# Patient Record
Sex: MALE | Race: ASIAN | NOT HISPANIC OR LATINO | URBAN - METROPOLITAN AREA
[De-identification: names, ages, dates, MRNs, and addresses within clinical notes are randomized per-mention and may not be internally consistent; named-entity substitution may affect disease eponyms.]

---

## 2017-08-22 ENCOUNTER — INPATIENT (INPATIENT)
Facility: HOSPITAL | Age: 67
LOS: 2 days | Discharge: HOME | End: 2017-08-25
Attending: INTERNAL MEDICINE | Admitting: INTERNAL MEDICINE
Payer: MEDICARE

## 2017-08-22 VITALS
WEIGHT: 164.91 LBS | HEART RATE: 48 BPM | DIASTOLIC BLOOD PRESSURE: 55 MMHG | RESPIRATION RATE: 19 BRPM | TEMPERATURE: 98 F | HEIGHT: 66 IN | SYSTOLIC BLOOD PRESSURE: 141 MMHG

## 2017-08-22 LAB
ALBUMIN SERPL ELPH-MCNC: 2.2 G/DL — LOW (ref 3.3–5)
ALP SERPL-CCNC: 106 U/L — SIGNIFICANT CHANGE UP (ref 40–120)
ALT FLD-CCNC: 32 U/L — SIGNIFICANT CHANGE UP (ref 12–78)
ANION GAP SERPL CALC-SCNC: 14 MMOL/L — SIGNIFICANT CHANGE UP (ref 5–17)
ANION GAP SERPL CALC-SCNC: 9 MMOL/L — SIGNIFICANT CHANGE UP (ref 5–17)
APTT BLD: 30.1 SEC — SIGNIFICANT CHANGE UP (ref 27.5–37.4)
AST SERPL-CCNC: 23 U/L — SIGNIFICANT CHANGE UP (ref 15–37)
BILIRUB SERPL-MCNC: 0.3 MG/DL — SIGNIFICANT CHANGE UP (ref 0.2–1.2)
BUN SERPL-MCNC: 20 MG/DL — SIGNIFICANT CHANGE UP (ref 7–23)
BUN SERPL-MCNC: 57 MG/DL — HIGH (ref 7–23)
CALCIUM SERPL-MCNC: 7.7 MG/DL — LOW (ref 8.5–10.1)
CALCIUM SERPL-MCNC: 8.4 MG/DL — LOW (ref 8.5–10.1)
CHLORIDE SERPL-SCNC: 103 MMOL/L — SIGNIFICANT CHANGE UP (ref 96–108)
CHLORIDE SERPL-SCNC: 106 MMOL/L — SIGNIFICANT CHANGE UP (ref 96–108)
CK MB CFR SERPL CALC: 2.6 NG/ML — SIGNIFICANT CHANGE UP (ref 0.5–3.6)
CO2 SERPL-SCNC: 20 MMOL/L — LOW (ref 22–31)
CO2 SERPL-SCNC: 30 MMOL/L — SIGNIFICANT CHANGE UP (ref 22–31)
CREAT SERPL-MCNC: 4.15 MG/DL — HIGH (ref 0.5–1.3)
CREAT SERPL-MCNC: 9.83 MG/DL — HIGH (ref 0.5–1.3)
EOSINOPHIL NFR BLD AUTO: 5 % — SIGNIFICANT CHANGE UP (ref 0–6)
GLUCOSE SERPL-MCNC: 103 MG/DL — HIGH (ref 70–99)
GLUCOSE SERPL-MCNC: 89 MG/DL — SIGNIFICANT CHANGE UP (ref 70–99)
HCT VFR BLD CALC: 24.9 % — LOW (ref 39–50)
HGB BLD-MCNC: 8.2 G/DL — LOW (ref 13–17)
INR BLD: 0.96 RATIO — SIGNIFICANT CHANGE UP (ref 0.88–1.16)
LYMPHOCYTES # BLD AUTO: 24 % — SIGNIFICANT CHANGE UP (ref 13–44)
MAGNESIUM SERPL-MCNC: 2.7 MG/DL — HIGH (ref 1.6–2.6)
MCHC RBC-ENTMCNC: 29.6 PG — SIGNIFICANT CHANGE UP (ref 27–34)
MCHC RBC-ENTMCNC: 33.1 GM/DL — SIGNIFICANT CHANGE UP (ref 32–36)
MCV RBC AUTO: 89.4 FL — SIGNIFICANT CHANGE UP (ref 80–100)
MONOCYTES NFR BLD AUTO: 3 % — SIGNIFICANT CHANGE UP (ref 2–14)
NEUTROPHILS NFR BLD AUTO: 68 % — SIGNIFICANT CHANGE UP (ref 43–77)
NT-PROBNP SERPL-SCNC: HIGH PG/ML (ref 0–125)
PLAT MORPH BLD: NORMAL — SIGNIFICANT CHANGE UP
PLATELET # BLD AUTO: 164 K/UL — SIGNIFICANT CHANGE UP (ref 150–400)
POTASSIUM SERPL-MCNC: 3.5 MMOL/L — SIGNIFICANT CHANGE UP (ref 3.5–5.3)
POTASSIUM SERPL-MCNC: 6.7 MMOL/L — CRITICAL HIGH (ref 3.5–5.3)
POTASSIUM SERPL-SCNC: 3.5 MMOL/L — SIGNIFICANT CHANGE UP (ref 3.5–5.3)
POTASSIUM SERPL-SCNC: 6.7 MMOL/L — CRITICAL HIGH (ref 3.5–5.3)
PROT SERPL-MCNC: 5.7 GM/DL — LOW (ref 6–8.3)
PROTHROM AB SERPL-ACNC: 10.4 SEC — SIGNIFICANT CHANGE UP (ref 9.8–12.7)
RBC # BLD: 2.78 M/UL — LOW (ref 4.2–5.8)
RBC # FLD: 14 % — SIGNIFICANT CHANGE UP (ref 11–15)
RBC BLD AUTO: NORMAL — SIGNIFICANT CHANGE UP
SODIUM SERPL-SCNC: 140 MMOL/L — SIGNIFICANT CHANGE UP (ref 135–145)
SODIUM SERPL-SCNC: 142 MMOL/L — SIGNIFICANT CHANGE UP (ref 135–145)
TROPONIN I SERPL-MCNC: 0.04 NG/ML — SIGNIFICANT CHANGE UP (ref 0.01–0.04)
WBC # BLD: 9 K/UL — SIGNIFICANT CHANGE UP (ref 3.8–10.5)
WBC # FLD AUTO: 9 K/UL — SIGNIFICANT CHANGE UP (ref 3.8–10.5)

## 2017-08-22 PROCEDURE — 99291 CRITICAL CARE FIRST HOUR: CPT

## 2017-08-22 PROCEDURE — 71010: CPT | Mod: 26

## 2017-08-22 RX ORDER — HYDRALAZINE HCL 50 MG
50 TABLET ORAL DAILY
Qty: 0 | Refills: 0 | Status: DISCONTINUED | OUTPATIENT
Start: 2017-08-22 | End: 2017-08-23

## 2017-08-22 RX ORDER — HYDRALAZINE HCL 50 MG
10 TABLET ORAL ONCE
Qty: 0 | Refills: 0 | Status: COMPLETED | OUTPATIENT
Start: 2017-08-22 | End: 2017-08-22

## 2017-08-22 RX ORDER — ATORVASTATIN CALCIUM 80 MG/1
80 TABLET, FILM COATED ORAL AT BEDTIME
Qty: 0 | Refills: 0 | Status: DISCONTINUED | OUTPATIENT
Start: 2017-08-22 | End: 2017-08-25

## 2017-08-22 RX ORDER — DEXTROSE 50 % IN WATER 50 %
12.5 SYRINGE (ML) INTRAVENOUS ONCE
Qty: 0 | Refills: 0 | Status: DISCONTINUED | OUTPATIENT
Start: 2017-08-22 | End: 2017-08-25

## 2017-08-22 RX ORDER — CLOPIDOGREL BISULFATE 75 MG/1
75 TABLET, FILM COATED ORAL DAILY
Qty: 0 | Refills: 0 | Status: DISCONTINUED | OUTPATIENT
Start: 2017-08-22 | End: 2017-08-25

## 2017-08-22 RX ORDER — CHLORHEXIDINE GLUCONATE 213 G/1000ML
1 SOLUTION TOPICAL DAILY
Qty: 0 | Refills: 0 | Status: DISCONTINUED | OUTPATIENT
Start: 2017-08-22 | End: 2017-08-25

## 2017-08-22 RX ORDER — INSULIN LISPRO 100/ML
VIAL (ML) SUBCUTANEOUS EVERY 6 HOURS
Qty: 0 | Refills: 0 | Status: DISCONTINUED | OUTPATIENT
Start: 2017-08-22 | End: 2017-08-24

## 2017-08-22 RX ORDER — DEXTROSE 50 % IN WATER 50 %
50 SYRINGE (ML) INTRAVENOUS ONCE
Qty: 0 | Refills: 0 | Status: COMPLETED | OUTPATIENT
Start: 2017-08-22 | End: 2017-08-22

## 2017-08-22 RX ORDER — ASPIRIN/CALCIUM CARB/MAGNESIUM 324 MG
81 TABLET ORAL DAILY
Qty: 0 | Refills: 0 | Status: DISCONTINUED | OUTPATIENT
Start: 2017-08-22 | End: 2017-08-25

## 2017-08-22 RX ORDER — INSULIN HUMAN 100 [IU]/ML
5 INJECTION, SOLUTION SUBCUTANEOUS ONCE
Qty: 0 | Refills: 0 | Status: COMPLETED | OUTPATIENT
Start: 2017-08-22 | End: 2017-08-22

## 2017-08-22 RX ORDER — CALCIUM GLUCONATE 100 MG/ML
2 VIAL (ML) INTRAVENOUS ONCE
Qty: 2 | Refills: 0 | Status: COMPLETED | OUTPATIENT
Start: 2017-08-22 | End: 2017-08-22

## 2017-08-22 RX ORDER — HYDRALAZINE HCL 50 MG
10 TABLET ORAL ONCE
Qty: 0 | Refills: 0 | Status: DISCONTINUED | OUTPATIENT
Start: 2017-08-22 | End: 2017-08-22

## 2017-08-22 RX ORDER — DEXTROSE 50 % IN WATER 50 %
25 SYRINGE (ML) INTRAVENOUS ONCE
Qty: 0 | Refills: 0 | Status: DISCONTINUED | OUTPATIENT
Start: 2017-08-22 | End: 2017-08-25

## 2017-08-22 RX ORDER — DEXTROSE 50 % IN WATER 50 %
1 SYRINGE (ML) INTRAVENOUS ONCE
Qty: 0 | Refills: 0 | Status: DISCONTINUED | OUTPATIENT
Start: 2017-08-22 | End: 2017-08-25

## 2017-08-22 RX ORDER — SODIUM CHLORIDE 9 MG/ML
1000 INJECTION, SOLUTION INTRAVENOUS
Qty: 0 | Refills: 0 | Status: DISCONTINUED | OUTPATIENT
Start: 2017-08-22 | End: 2017-08-25

## 2017-08-22 RX ORDER — GLUCAGON INJECTION, SOLUTION 0.5 MG/.1ML
1 INJECTION, SOLUTION SUBCUTANEOUS ONCE
Qty: 0 | Refills: 0 | Status: DISCONTINUED | OUTPATIENT
Start: 2017-08-22 | End: 2017-08-25

## 2017-08-22 RX ORDER — INSULIN HUMAN 100 [IU]/ML
5 INJECTION, SOLUTION SUBCUTANEOUS ONCE
Qty: 0 | Refills: 0 | Status: DISCONTINUED | OUTPATIENT
Start: 2017-08-22 | End: 2017-08-22

## 2017-08-22 RX ORDER — HEPARIN SODIUM 5000 [USP'U]/ML
5000 INJECTION INTRAVENOUS; SUBCUTANEOUS EVERY 12 HOURS
Qty: 0 | Refills: 0 | Status: DISCONTINUED | OUTPATIENT
Start: 2017-08-22 | End: 2017-08-25

## 2017-08-22 RX ADMIN — Medication 10 MILLIGRAM(S): at 23:01

## 2017-08-22 RX ADMIN — INSULIN HUMAN 5 UNIT(S): 100 INJECTION, SOLUTION SUBCUTANEOUS at 16:45

## 2017-08-22 RX ADMIN — Medication 200 GRAM(S): at 15:43

## 2017-08-22 RX ADMIN — Medication 50 MILLILITER(S): at 16:45

## 2017-08-22 RX ADMIN — ATORVASTATIN CALCIUM 80 MILLIGRAM(S): 80 TABLET, FILM COATED ORAL at 23:01

## 2017-08-22 NOTE — H&P ADULT - NSHPPHYSICALEXAM_GEN_ALL_CORE
GENERAL: NAD, confused   HEAD:  Atraumatic, Normocephalic  EYES: EOMI, PERRLA, conjunctiva and sclera clear  NERVOUS SYSTEM:  Alert & Oriented X3, confused at times, Motor Strength 5/5 B/L upper and lower extremities;   CHEST/LUNG: CTA bilaterally; No rales, rhonchi, wheezing, or rubs  HEART: Regular rate and rhythm; No murmurs, rubs, or gallops HR 70   ABDOMEN: Soft, Nontender, Nondistended; Bowel sounds present  EXTREMITIES:  2+ Peripheral Pulses, No clubbing, cyanosis, or edema  LYMPH: No lymphadenopathy noted  SKIN: R permicath

## 2017-08-22 NOTE — ED PROVIDER NOTE - MEDICAL DECISION MAKING DETAILS
patient pw bradycardia, presumably owing to hyperk patient pw bradycardia, presumably owing to hyperk vs sinus node dysfunction vs both. pacer pads will be placed and he will need HD and potassium correction. Will eventually need cardiology for pacemaker.

## 2017-08-22 NOTE — H&P ADULT - NSHPREVIEWOFSYSTEMS_GEN_ALL_CORE
CONSTITUTIONAL: No fever, weight loss, or fatigue    RESPIRATORY: No cough, wheezing, chills or hemoptysis; No shortness of breath  CARDIOVASCULAR: No chest pain, palpitations, dizziness, or leg swelling  GASTROINTESTINAL: No abdominal or epigastric pain. No nausea, vomiting, or hematemesis; No diarrhea or constipation. No melena or hematochezia.  GENITOURINARY: No dysuria, frequency, hematuria, or incontinence  NEUROLOGICAL: No headaches, memory loss, loss of strength, numbness, or tremors  SKIN: No itching, burning, rashes, or lesions   LYMPH NODES: No enlarged glands  ENDOCRINE: No heat or cold intolerance; No hair loss  MUSCULOSKELETAL: No joint pain or swelling; No muscle, back, or extremity pain  PSYCHIATRIC: No depression, anxiety, mood swings, or difficulty sleeping  HEME/LYMPH: No easy bruising, or bleeding gums  ALLERGY AND IMMUNOLOGIC: No hives or eczema CONSTITUTIONAL: No fever    RESPIRATORY: No cough, wheezing,  No shortness of breath  CARDIOVASCULAR: No chest pain, palpitations, dizziness, or leg swelling  GASTROINTESTINAL: No abdominal or epigastric pain. No nausea, vomiting, or hematemesis; No diarrhea or constipation. No melena or hematochezia.  GENITOURINARY: No dysuria, frequency, hematuria, or incontinence  NEUROLOGICAL: No headaches, memory loss, loss of strength, numbness, or tremors CONSTITUTIONAL: No fever  RESPIRATORY: No cough, wheezing,  No shortness of breath  CARDIOVASCULAR: No chest pain, palpitations, dizziness, or leg swelling  GASTROINTESTINAL: No abdominal pain No nausea, vomiting, No diarrhea or constipation.   GENITOURINARY: No dysuria, frequency, hematuria, or incontinence  NEUROLOGICAL: No headaches, loss of strength, numbness, or tremors

## 2017-08-22 NOTE — CONSULT NOTE ADULT - SUBJECTIVE AND OBJECTIVE BOX
Patient is a 67y old  Male sent to E.R from [facility] with bradycardia. Dialysis patient TTS for 4-5 months, recent bacteremia, no permanent HD access yet. Last dialysis on 8/19/17. No chest pain, no other c/o. Admits to nonadherence to low Potassium diet. History obtained from the patient, who is a poor historian, patient's wife, and available records. In E.R. found to have bradyarrhythmia without hyperkalemic EKG changes. Received I.V. Calcium Gluconate, Insulin and Dextrose. Renal consult is requested for dialysis.      PAST MEDICAL HISTORY:  ESRD  ASHD  HTN  Hyperlipidemia  NIDDM    PAST SURGICAL HISTORY:  Femoral dialysis catheter  Permacath X 2  Scheduled for AVF    SOCIAL HISTORY:  Former smoker, no ETOH,     No Known Allergies    MEDICATIONS  AT HOME:  AMLODIPINE  ASA  ATORVASTATIN  CALCITRIOL  CILOSTAZOL  PLAVIX  COLACE  PEPCID  HYDRALAZINE  ISOSORBIDE  JANUVIA  LABETALOL  LOSARTAN  PHOSLO      Vital Signs Last 24 Hrs  T(C): 36.5 (22 Aug 2017 15:44), Max: 36.6 (22 Aug 2017 15:11)  T(F): 97.7 (22 Aug 2017 15:44), Max: 97.9 (22 Aug 2017 15:11)  HR: 49 (22 Aug 2017 15:44) (48 - 49)  BP: 141/55 (22 Aug 2017 15:44) (141/55 - 141/55)  BP(mean): --  RR: 19 (22 Aug 2017 15:44) (19 - 19)  SpO2: 98% (22 Aug 2017 15:44) (98% - 98%)    PHYSICAL EXAM:  General: NAD, AAO x3, on stretcher in E.R., on external pacemaker precautions  Respiratory: b/l air entry, rales at bases  Cardiovascular: S1 S2 irregular, no rub  Gastrointestinal: soft, not tender, BS present  Extremities: 2 plus legs edema  Right chest wall PC dressed      CAPILLARY BLOOD GLUCOSE  107 (22 Aug 2017 15:13)        08-22    140  |  106  |  57<H>  ----------------------------<  103<H>  6.7<HH>   |  20<L>  |  9.83<H>    Ca    7.7<L>      22 Aug 2017 15:51  Mg     2.7     08-22    TPro  5.7<L>  /  Alb  2.2<L>  /  TBili  0.3  /  DBili  x   /  AST  23  /  ALT  32  /  AlkPhos  106  08-22    Hemoglobin: 8.2 g/dL (08-22 @ 15:51)  Hematocrit: 24.9 % (08-22 @ 15:51)  Potassium, Serum: 6.7 mmol/L (08-22 @ 15:51)  Blood Urea Nitrogen, Serum: 57 mg/dL (08-22 @ 15:51)  Calcium, Total Serum: 7.7 mg/dL (08-22 @ 15:51)      Creatinine, Serum: 9.83 (08-22 @ 15:51)    CBC Full  -  ( 22 Aug 2017 15:51 )  WBC Count : 9.0 K/uL  Hemoglobin : 8.2 g/dL  Hematocrit : 24.9 %  Platelet Count - Automated : 164 K/uL  Mean Cell Volume : 89.4 fl  Mean Cell Hemoglobin : 29.6 pg  Mean Cell Hemoglobin Concentration : 33.1 gm/dL  Auto Neutrophil # : x  Auto Lymphocyte # : x  Auto Monocyte # : x  Auto Eosinophil # : x  Auto Basophil # : x  Auto Neutrophil % : 68.0 %  Auto Lymphocyte % : 24.0 %  Auto Monocyte % : 3.0 %  Auto Eosinophil % : 5.0 %  Auto Basophil % : x

## 2017-08-22 NOTE — CONSULT NOTE ADULT - ASSESSMENT
ASHD, DM, HTN, ESRD, fluid overload, anemia, moderate hyperkalemia. Bradycardia without hyperkalemic EKG changes. To be evaluated by cardiology.  Will have urgent dialysis in E.R. or ICU. Advised to adhere to low dialysis diet. Spoke with patient's wife at bedside.

## 2017-08-22 NOTE — H&P ADULT - ATTENDING COMMENTS
I have seen and examined the patient. I agree with the above history, physical exam, and plan of care except as detailed below.    68 y/o M w/DM and ESRD on HD sent from dialysis for bradycardia in 20s-40s. Patient found to be hyperkalemic to 6.7 on blood work on arrival. Patient currently asymptomatic and hemodynamically stable despite bradycardia. Patient has received medical management for hyperkalemia and is now undergoing hemodialysis.    - HD  - Repeat chem following HD  - Appreciate cardiology consult  - Pacer pads on patient  - Insulin coverage as needed  - DVT prophylaxis    CCT 35 min

## 2017-08-22 NOTE — CONSULT NOTE ADULT - SUBJECTIVE AND OBJECTIVE BOX
HPI:  HPI:      Chief Complaint:  Patient is a 67y old  Male who presents with a chief complaint of   bradycardia  Review of Systems:    see above         Social History/Family History  SOCHX:   tobacco,  -  alcohol    FMHX: FA/MO  - contributory       Discussed with:  PMD, Family    Physical Exam:    Vital Signs:  Vital Signs Last 24 Hrs  T(C): 35.8 (22 Aug 2017 18:50), Max: 36.6 (22 Aug 2017 15:11)  T(F): 96.4 (22 Aug 2017 18:50), Max: 97.9 (22 Aug 2017 15:11)  HR: 45 (22 Aug 2017 18:53) (45 - 50)  BP: 183/63 (22 Aug 2017 18:53) (141/55 - 208/52)  BP(mean): 93 (22 Aug 2017 18:53) (93 - 93)  RR: 24 (22 Aug 2017 18:53) (19 - 24)  SpO2: 99% (22 Aug 2017 18:53) (98% - 100%)  Daily Height in cm: 167.64 (22 Aug 2017 15:11)    Daily   I&O's Summary        Chest:  Full & symmetric excursion, no increased effort, breath sounds clear  Cardiovascular:  bradycardia, Regular rhythm, S1, S2, no murmur/rub/S3/S4, no carotid/femoral/abdominal bruit, radial/pedal pulses 2+, no edema  Abdomen:  Soft, non-tender, non-distended, normoactive bowel sounds, no HSM      Laboratory:                          8.2    9.0   )-----------( 164      ( 22 Aug 2017 15:51 )             24.9     08-22    140  |  106  |  57<H>  ----------------------------<  103<H>  6.7<HH>   |  20<L>  |  9.83<H>    Ca    7.7<L>      22 Aug 2017 15:51  Mg     2.7     08-22    TPro  5.7<L>  /  Alb  2.2<L>  /  TBili  0.3  /  DBili  x   /  AST  23  /  ALT  32  /  AlkPhos  106  08-22      CARDIAC MARKERS ( 22 Aug 2017 15:51 )  .043 ng/mL / x     / x     / x     / 2.6 ng/mL      CAPILLARY BLOOD GLUCOSE  96 (22 Aug 2017 18:58)  107 (22 Aug 2017 15:13)        LIVER FUNCTIONS - ( 22 Aug 2017 15:51 )  Alb: 2.2 g/dL / Pro: 5.7 gm/dL / ALK PHOS: 106 U/L / ALT: 32 U/L / AST: 23 U/L / GGT: x           PT/INR - ( 22 Aug 2017 15:52 )   PT: 10.4 sec;   INR: 0.96 ratio         PTT - ( 22 Aug 2017 15:52 )  PTT:30.1 sec            Assessment:  Severe Bradycardia  Setting is renal failure with K at 6.7  Await HD, correction of this potassium level.   bedside attached pacer  observe in CCU, will follow          ·

## 2017-08-22 NOTE — H&P ADULT - HISTORY OF PRESENT ILLNESS
67M hx of ESRD on TThS HD was on his way to get his HD this AM and was found to be bradycardic with HR in 20-40's.  Pt was advised to go to the ED at that time.  In the Ed the pt presented with bradycardia 30-40's and was found to be hyperkalemic with a potassium of 6.5.  Pt denies SOB, CP, palpitations, at this time.  Pt was admitted to the ICU for emergent HD, D50/insulin/calcium cocktail was given in the ED, and pacer pads were placed on the patient. 67M hx of ESRD on TThS HD was on his way to get his HD this AM and was found to be bradycardic with HR in 20-40's.  Pt was advised to go to the ED at that time.  In the Ed the pt presented with bradycardia 30-40's and was found to be hyperkalemic with a potassium of 6.5.  Pt denies SOB, CP, palpitations, at this time.  Pt was admitted to the ICU for emergent HD, D50/insulin/calcium cocktail was given in the ED, and pacer pads were placed on the patient. Will f/u with post dialysis BMP's and monitor HR 67M PMH of HTN, DM, ESRD on TThS HD was on his way to get his HD this AM and was found to be bradycardic with HR in 20-40's.  Pt was advised to go to the ED at that time.  In the Ed the pt presented with bradycardia 30-40's and was found to be hyperkalemic with a potassium of 6.5.  Pt denies SOB, CP, palpitations, at this time.  Pt was admitted to the ICU for emergent HD, D50/insulin/calcium cocktail was given in the ED, and pacer pads were placed on the patient. Will f/u with post dialysis BMP's and monitor HR

## 2017-08-22 NOTE — H&P ADULT - PMH
ESRD (end stage renal disease) on dialysis    Essential hypertension    Type 2 diabetes mellitus with stage 4 chronic kidney disease, unspecified long term insulin use status

## 2017-08-22 NOTE — H&P ADULT - NSHPLABSRESULTS_GEN_ALL_CORE
Lab Results:  CBC  CBC Full  -  ( 22 Aug 2017 15:51 )  WBC Count : 9.0 K/uL  Hemoglobin : 8.2 g/dL  Hematocrit : 24.9 %  Platelet Count - Automated : 164 K/uL  Mean Cell Volume : 89.4 fl  Mean Cell Hemoglobin : 29.6 pg  Mean Cell Hemoglobin Concentration : 33.1 gm/dL  Auto Neutrophil # : x  Auto Lymphocyte # : x  Auto Monocyte # : x  Auto Eosinophil # : x  Auto Basophil # : x  Auto Neutrophil % : 68.0 %  Auto Lymphocyte % : 24.0 %  Auto Monocyte % : 3.0 %  Auto Eosinophil % : 5.0 %  Auto Basophil % : x    .		Differential:	[] Automated		[] Manual  Chemistry                        8.2    9.0   )-----------( 164      ( 22 Aug 2017 15:51 )             24.9     08-22    140  |  106  |  57<H>  ----------------------------<  103<H>  6.7<HH>   |  20<L>  |  9.83<H>    Ca    7.7<L>      22 Aug 2017 15:51  Mg     2.7     08-22    TPro  5.7<L>  /  Alb  2.2<L>  /  TBili  0.3  /  DBili  x   /  AST  23  /  ALT  32  /  AlkPhos  106  08-22    LIVER FUNCTIONS - ( 22 Aug 2017 15:51 )  Alb: 2.2 g/dL / Pro: 5.7 gm/dL / ALK PHOS: 106 U/L / ALT: 32 U/L / AST: 23 U/L / GGT: x           PT/INR - ( 22 Aug 2017 15:52 )   PT: 10.4 sec;   INR: 0.96 ratio         PTT - ( 22 Aug 2017 15:52 )  PTT:30.1 sec          MICROBIOLOGY/CULTURES:      RADIOLOGY RESULTS:

## 2017-08-22 NOTE — ED ADULT NURSE NOTE - OBJECTIVE STATEMENT
Patient brought in by EMS, was at dialysis center and started to feel short of breath. Patient was not dialyzed today. MD aware. Patient is a poor historian, answering questions minimally with much delay in answer time.

## 2017-08-22 NOTE — H&P ADULT - ASSESSMENT
67M PMH of HTN, DM, ESRD on TThS HD found to have bradycardia in the setting of hyperkalemia in need of urgent HD.  Pt admitted to the ICU for HD, monitoring HR, will f/u with and monitor potassium     Plan:    1.  Bradycardia (HR 20-40's) in the setting of hyperkalemia (6.7)  -Pacer pads placed on pt at bedside  -Pt received D50/insulin/Calicum in the ED  -Emergent HD, will f/u post HD BMP and trend potassium and electrolytes   -Cardiology c/s Dr. Hendrickson     2.  HTN  Adjust BP meds.   Monitor BP closely.     3.  ESRD   -Emergent HD   -monitor BUN/Cr and electrolytes     4. DM  Regular Insulin Slide Scale  Finger sticks Q 6 hours  Hemoglobin A1c

## 2017-08-22 NOTE — ED PROVIDER NOTE - PHYSICAL EXAMINATION
Gen: Alert, NAD  Head: NC, AT   Eyes: PERRL, EOMI, normal lids/conjunctiva  ENT: normal hearing, patent oropharynx without erythema/exudate, uvula midline  Neck: supple, no tenderness, Trachea midline  Pulm: Bilateral BS, normal resp effort, no wheeze/stridor/retractions  CV: extreme bradycardia. no M/R/G, 2+ radial and dp pulses bl, no edema, right chest mayers  Abd: soft, NT/ND, +BS, no hepatosplenomegaly  Mskel: extremities x4 with normal ROM and no joint effusions. no ctl spine ttp.   Skin: no rash, no bruising   Neuro: AAOx3, no sensory/motor deficits, CN 2-12 intact

## 2017-08-22 NOTE — ED ADULT TRIAGE NOTE - CHIEF COMPLAINT QUOTE
Per EMS pt was at dialysis center with bradycardia. Pt did not receive dialysis treatment. Pt denies any chest pain or sob at triage

## 2017-08-22 NOTE — ED PROVIDER NOTE - OBJECTIVE STATEMENT
Pertinent PMH/PSH/FHx/SHx and Review of Systems contained within:  67M hx of esrd on tts hd pw bradycardia. patient was on his way to a start a session when his hr was checked and he was found to be very tonia. hr 20s - 40s, junctional. patient denies symptoms of cp, sob, palpitations. ems started ns  Fh and Sh not otherwise contributory  ROS otherwise negative

## 2017-08-23 DIAGNOSIS — I10 ESSENTIAL (PRIMARY) HYPERTENSION: ICD-10-CM

## 2017-08-23 DIAGNOSIS — R00.1 BRADYCARDIA, UNSPECIFIED: ICD-10-CM

## 2017-08-23 DIAGNOSIS — N18.6 END STAGE RENAL DISEASE: ICD-10-CM

## 2017-08-23 LAB
ALBUMIN SERPL ELPH-MCNC: 2.3 G/DL — LOW (ref 3.3–5)
ANION GAP SERPL CALC-SCNC: 13 MMOL/L — SIGNIFICANT CHANGE UP (ref 5–17)
APPEARANCE UR: CLEAR — SIGNIFICANT CHANGE UP
BACTERIA # UR AUTO: ABNORMAL
BILIRUB UR-MCNC: NEGATIVE — SIGNIFICANT CHANGE UP
BUN SERPL-MCNC: 25 MG/DL — HIGH (ref 7–23)
CALCIUM SERPL-MCNC: 8.2 MG/DL — LOW (ref 8.5–10.1)
CHLORIDE SERPL-SCNC: 103 MMOL/L — SIGNIFICANT CHANGE UP (ref 96–108)
CO2 SERPL-SCNC: 26 MMOL/L — SIGNIFICANT CHANGE UP (ref 22–31)
COLOR SPEC: YELLOW — SIGNIFICANT CHANGE UP
CREAT SERPL-MCNC: 5.64 MG/DL — HIGH (ref 0.5–1.3)
DIFF PNL FLD: ABNORMAL
EPI CELLS # UR: SIGNIFICANT CHANGE UP
GLUCOSE SERPL-MCNC: 87 MG/DL — SIGNIFICANT CHANGE UP (ref 70–99)
GLUCOSE UR QL: 100 MG/DL
HBA1C BLD-MCNC: 5.4 % — SIGNIFICANT CHANGE UP (ref 4–5.6)
HCT VFR BLD CALC: 25.9 % — LOW (ref 39–50)
HGB BLD-MCNC: 9.2 G/DL — LOW (ref 13–17)
KETONES UR-MCNC: NEGATIVE — SIGNIFICANT CHANGE UP
LEUKOCYTE ESTERASE UR-ACNC: NEGATIVE — SIGNIFICANT CHANGE UP
MAGNESIUM SERPL-MCNC: 2.3 MG/DL — SIGNIFICANT CHANGE UP (ref 1.6–2.6)
MCHC RBC-ENTMCNC: 31.6 PG — SIGNIFICANT CHANGE UP (ref 27–34)
MCHC RBC-ENTMCNC: 35.5 GM/DL — SIGNIFICANT CHANGE UP (ref 32–36)
MCV RBC AUTO: 89 FL — SIGNIFICANT CHANGE UP (ref 80–100)
NITRITE UR-MCNC: NEGATIVE — SIGNIFICANT CHANGE UP
PH UR: 8 — SIGNIFICANT CHANGE UP (ref 5–8)
PHOSPHATE SERPL-MCNC: 4.4 MG/DL — SIGNIFICANT CHANGE UP (ref 2.5–4.5)
PLATELET # BLD AUTO: 194 K/UL — SIGNIFICANT CHANGE UP (ref 150–400)
POTASSIUM SERPL-MCNC: 4.3 MMOL/L — SIGNIFICANT CHANGE UP (ref 3.5–5.3)
POTASSIUM SERPL-SCNC: 4.3 MMOL/L — SIGNIFICANT CHANGE UP (ref 3.5–5.3)
PROT UR-MCNC: 500 MG/DL
RBC # BLD: 2.91 M/UL — LOW (ref 4.2–5.8)
RBC # FLD: 13.6 % — SIGNIFICANT CHANGE UP (ref 11–15)
RBC CASTS # UR COMP ASSIST: SIGNIFICANT CHANGE UP /HPF (ref 0–4)
SODIUM SERPL-SCNC: 142 MMOL/L — SIGNIFICANT CHANGE UP (ref 135–145)
SP GR SPEC: 1.01 — SIGNIFICANT CHANGE UP (ref 1.01–1.02)
UROBILINOGEN FLD QL: NEGATIVE MG/DL — SIGNIFICANT CHANGE UP
WBC # BLD: 9.5 K/UL — SIGNIFICANT CHANGE UP (ref 3.8–10.5)
WBC # FLD AUTO: 9.5 K/UL — SIGNIFICANT CHANGE UP (ref 3.8–10.5)

## 2017-08-23 PROCEDURE — 93010 ELECTROCARDIOGRAM REPORT: CPT

## 2017-08-23 RX ORDER — LISINOPRIL 2.5 MG/1
10 TABLET ORAL DAILY
Qty: 0 | Refills: 0 | Status: DISCONTINUED | OUTPATIENT
Start: 2017-08-23 | End: 2017-08-23

## 2017-08-23 RX ORDER — NICARDIPINE HYDROCHLORIDE 30 MG/1
1 CAPSULE, EXTENDED RELEASE ORAL
Qty: 50 | Refills: 0 | Status: DISCONTINUED | OUTPATIENT
Start: 2017-08-23 | End: 2017-08-23

## 2017-08-23 RX ORDER — LOSARTAN POTASSIUM 100 MG/1
100 TABLET, FILM COATED ORAL ONCE
Qty: 0 | Refills: 0 | Status: COMPLETED | OUTPATIENT
Start: 2017-08-23 | End: 2017-08-23

## 2017-08-23 RX ORDER — HYDRALAZINE HCL 50 MG
10 TABLET ORAL ONCE
Qty: 0 | Refills: 0 | Status: COMPLETED | OUTPATIENT
Start: 2017-08-23 | End: 2017-08-23

## 2017-08-23 RX ORDER — LOSARTAN POTASSIUM 100 MG/1
100 TABLET, FILM COATED ORAL DAILY
Qty: 0 | Refills: 0 | Status: DISCONTINUED | OUTPATIENT
Start: 2017-08-24 | End: 2017-08-25

## 2017-08-23 RX ORDER — LISINOPRIL 2.5 MG/1
20 TABLET ORAL DAILY
Qty: 0 | Refills: 0 | Status: DISCONTINUED | OUTPATIENT
Start: 2017-08-23 | End: 2017-08-23

## 2017-08-23 RX ORDER — AMLODIPINE BESYLATE 2.5 MG/1
10 TABLET ORAL DAILY
Qty: 0 | Refills: 0 | Status: DISCONTINUED | OUTPATIENT
Start: 2017-08-23 | End: 2017-08-25

## 2017-08-23 RX ORDER — LOSARTAN POTASSIUM 100 MG/1
100 TABLET, FILM COATED ORAL DAILY
Qty: 0 | Refills: 0 | Status: DISCONTINUED | OUTPATIENT
Start: 2017-08-23 | End: 2017-08-23

## 2017-08-23 RX ORDER — HYDRALAZINE HCL 50 MG
50 TABLET ORAL ONCE
Qty: 0 | Refills: 0 | Status: COMPLETED | OUTPATIENT
Start: 2017-08-23 | End: 2017-08-23

## 2017-08-23 RX ORDER — CALCIUM ACETATE 667 MG
667 TABLET ORAL
Qty: 0 | Refills: 0 | Status: DISCONTINUED | OUTPATIENT
Start: 2017-08-23 | End: 2017-08-25

## 2017-08-23 RX ORDER — HYDRALAZINE HCL 50 MG
100 TABLET ORAL EVERY 8 HOURS
Qty: 0 | Refills: 0 | Status: DISCONTINUED | OUTPATIENT
Start: 2017-08-23 | End: 2017-08-25

## 2017-08-23 RX ORDER — NICARDIPINE HYDROCHLORIDE 30 MG/1
5 CAPSULE, EXTENDED RELEASE ORAL
Qty: 40 | Refills: 0 | Status: DISCONTINUED | OUTPATIENT
Start: 2017-08-23 | End: 2017-08-23

## 2017-08-23 RX ORDER — LOSARTAN POTASSIUM 100 MG/1
TABLET, FILM COATED ORAL
Qty: 0 | Refills: 0 | Status: DISCONTINUED | OUTPATIENT
Start: 2017-08-23 | End: 2017-08-25

## 2017-08-23 RX ADMIN — Medication 10 MILLIGRAM(S): at 03:46

## 2017-08-23 RX ADMIN — Medication 10 MILLIGRAM(S): at 00:36

## 2017-08-23 RX ADMIN — Medication 50 MILLIGRAM(S): at 03:15

## 2017-08-23 RX ADMIN — Medication 1 PATCH: at 22:00

## 2017-08-23 RX ADMIN — LOSARTAN POTASSIUM 100 MILLIGRAM(S): 100 TABLET, FILM COATED ORAL at 14:09

## 2017-08-23 RX ADMIN — Medication 81 MILLIGRAM(S): at 11:37

## 2017-08-23 RX ADMIN — Medication 667 MILLIGRAM(S): at 18:16

## 2017-08-23 RX ADMIN — Medication 0.1 MILLIGRAM(S): at 20:36

## 2017-08-23 RX ADMIN — Medication 50 MILLIGRAM(S): at 05:54

## 2017-08-23 RX ADMIN — Medication 100 MILLIGRAM(S): at 22:00

## 2017-08-23 RX ADMIN — ATORVASTATIN CALCIUM 80 MILLIGRAM(S): 80 TABLET, FILM COATED ORAL at 22:00

## 2017-08-23 RX ADMIN — LISINOPRIL 20 MILLIGRAM(S): 2.5 TABLET ORAL at 10:14

## 2017-08-23 RX ADMIN — Medication 100 MILLIGRAM(S): at 13:21

## 2017-08-23 RX ADMIN — NICARDIPINE HYDROCHLORIDE 25 MG/HR: 30 CAPSULE, EXTENDED RELEASE ORAL at 07:37

## 2017-08-23 RX ADMIN — AMLODIPINE BESYLATE 10 MILLIGRAM(S): 2.5 TABLET ORAL at 13:50

## 2017-08-23 RX ADMIN — HEPARIN SODIUM 5000 UNIT(S): 5000 INJECTION INTRAVENOUS; SUBCUTANEOUS at 18:16

## 2017-08-23 RX ADMIN — CHLORHEXIDINE GLUCONATE 1 APPLICATION(S): 213 SOLUTION TOPICAL at 11:37

## 2017-08-23 RX ADMIN — Medication 10 MILLIGRAM(S): at 14:02

## 2017-08-23 RX ADMIN — CLOPIDOGREL BISULFATE 75 MILLIGRAM(S): 75 TABLET, FILM COATED ORAL at 11:37

## 2017-08-23 RX ADMIN — HEPARIN SODIUM 5000 UNIT(S): 5000 INJECTION INTRAVENOUS; SUBCUTANEOUS at 05:54

## 2017-08-23 NOTE — DIETITIAN INITIAL EVALUATION ADULT. - PERTINENT LABORATORY DATA
08-23 Na 142 mmol/L Glu 87 mg/dL K+ 4.3 mmol/L Cr  5.64 mg/dL<H> BUN 25 mg/dL<H> Phos 4.4 mg/dL Alb 2.3 g/dL<L> PAB n/a   Hgb 9.2 g/dL<L> Hct 25.9 %<L> [id number]=7.4%(8/23), FCO=[id number]

## 2017-08-23 NOTE — DIETITIAN INITIAL EVALUATION ADULT. - NS AS NUTRI INTERV MEALS SNACK
Recommend Renal diet & consider d/c CCHO no snack due to LgkK1A=0.4%(8/23) & Glucose WNL/Mineral - modified diet

## 2017-08-23 NOTE — PROGRESS NOTE ADULT - ASSESSMENT
67M PMH of HTN, DM, ESRD on TThS HD found to have bradycardia in the setting of hyperkalemia in need of urgent HD complicated by uncontrolled HTN post dialysis requiring cardene gtt for BP control.

## 2017-08-23 NOTE — PROGRESS NOTE ADULT - ATTENDING COMMENTS
Pt seen and examined on morning rounds with team 8/23    67M PMH ESRD on HD, HTN, DM sent from dialysis center for bradycardia. Pt found to be in junctional bradycardia HR 20-30s with hyperkalemia K 6.7 Admitted to ICU for monitoring. s/p medical treatment with calcium, insulin/D50. s/p HD with resolution of hyperkalemia and bradycardia. Course with hypertensive urgency requiring cardene drip.    - off cardene drip, PO meds re-initiated (lisinopril, losartan, hydralazine)  - BP remains elevated, norvasc added for better BP control, will add clonidine  - will cont to monitor K  - HD per renal  - Pt seen and examined on morning rounds with team 8/23    67M PMH ESRD on HD, HTN, DM sent from dialysis center for bradycardia. Pt found to be in junctional bradycardia HR 20-30s with hyperkalemia K 6.7 Admitted to ICU for monitoring. s/p medical treatment with calcium, insulin/D50. s/p HD with resolution of hyperkalemia and bradycardia. Course with hypertensive urgency requiring cardene drip.    - off cardene drip, PO meds re-initiated (lisinopril, losartan, hydralazine)  - BP remains elevated, norvasc added for better BP control, will add clonidine  - will cont to monitor K  - HD per renal  - cont sliding scale coverage for DM  - if BP remains stable will transfer to medical floor    Critical Care Time: 32min

## 2017-08-23 NOTE — PROGRESS NOTE ADULT - SUBJECTIVE AND OBJECTIVE BOX
Assessment:  Severe Bradycardia improved  Setting is renal failure with K at 6.7  post  HD, correction of this lytes, HR now better  observe in CCU, will follow  HTN meds adjusted to include non rate  modifying agents

## 2017-08-23 NOTE — PROGRESS NOTE ADULT - SUBJECTIVE AND OBJECTIVE BOX
Seen in ICU. Monitor with sinus bradycardia. Elevated BP. No c/o. Was on Cardene drip earlier. Intermittent confusion (chronic as per patient's wife).  67y old  Male sent to [facility] from [facility] with bradycardia. Dialysis patient TTS for 4-5 months, recent bacteremia, no permanent HD access yet. Admits to nonadherence to low Potassium diet. Had dialysis yesterday with correction of hyperkalemia. Seen by cardiology.    PAST MEDICAL HISTORY:  ESRD  ASHD  HTN  Hyperlipidemia  NIDDM    PAST SURGICAL HISTORY:  Femoral dialysis catheter  Permacath X 2  Scheduled for AVF    No Known Allergies    MEDICATIONS  AT HOME:  AMLODIPINE  ASA  ATORVASTATIN  CALCITRIOL  CILOSTAZOL  PLAVIX  COLACE  PEPCID  HYDRALAZINE  ISOSORBIDE  JANUVIA  LABETALOL  LOSARTAN  PHOSLO      MEDICATIONS  (STANDING):  chlorhexidine 4% Liquid 1 Application(s) Topical daily  heparin  Injectable 5000 Unit(s) SubCutaneous every 12 hours  aspirin  chewable 81 milliGRAM(s) Oral daily  clopidogrel Tablet 75 milliGRAM(s) Oral daily  atorvastatin 80 milliGRAM(s) Oral at bedtime  insulin lispro (HumaLOG) corrective regimen sliding scale   SubCutaneous every 6 hours  dextrose 5%. 1000 milliLiter(s) (50 mL/Hr) IV Continuous <Continuous>  dextrose 50% Injectable 12.5 Gram(s) IV Push once  dextrose 50% Injectable 25 Gram(s) IV Push once  dextrose 50% Injectable 25 Gram(s) IV Push once  niCARdipine Infusion 5 mG/Hr (25 mL/Hr) IV Continuous <Continuous>  hydrALAZINE 100 milliGRAM(s) Oral every 8 hours  lisinopril 20 milliGRAM(s) Oral daily    MEDICATIONS  (PRN):  dextrose Gel 1 Dose(s) Oral once PRN Blood Glucose LESS THAN 70 milliGRAM(s)/deciliter  glucagon  Injectable 1 milliGRAM(s) IntraMuscular once PRN Glucose LESS THAN 70 milligrams/deciliter    T(C): 36.7 (08-23-17 @ 11:05), Max: 36.9 (08-23-17 @ 07:12)  HR: 63 (08-23-17 @ 13:05) (45 - 76)  BP: 226/197 (08-23-17 @ 13:05) (122/91 - 242/69)  RR: 19 (08-23-17 @ 13:05) (15 - 32)  SpO2: 100% (08-23-17 @ 13:05) (83% - 100%)  Wt(kg): --  I&O's Detail    22 Aug 2017 07:01  -  23 Aug 2017 07:00  --------------------------------------------------------  IN:    Oral Fluid: 300 mL  Total IN: 300 mL    OUT:    Other: 3000 mL    Voided: 100 mL  Total OUT: 3100 mL    Total NET: -2800 mL      23 Aug 2017 07:01  -  23 Aug 2017 13:28  --------------------------------------------------------  IN:    niCARdipine Infusion: 15 mL  Total IN: 15 mL    OUT:    Voided: 100 mL  Total OUT: 100 mL    Total NET: -85 mL      PHYSICAL EXAM:  General: NAD, AAO x3, in chair  Respiratory: b/l clear  Cardiovascular: S1 S2 irregular, no rub  Gastrointestinal: soft, not tender, BS present  Extremities: no edema  Right chest wall PC dressed    CBC Full  -  ( 23 Aug 2017 03:18 )  WBC Count : 9.5 K/uL  Hemoglobin : 9.2 g/dL  Hematocrit : 25.9 %  Platelet Count - Automated : 194 K/uL  Mean Cell Volume : 89.0 fl  Mean Cell Hemoglobin : 31.6 pg  Mean Cell Hemoglobin Concentration : 35.5 gm/dL  Auto Neutrophil # : x  Auto Lymphocyte # : x  Auto Monocyte # : x  Auto Eosinophil # : x  Auto Basophil # : x  Auto Neutrophil % : x  Auto Lymphocyte % : x  Auto Monocyte % : x  Auto Eosinophil % : x  Auto Basophil % : x    08-23    142  |  103  |  25<H>  ----------------------------<  87  4.3   |  26  |  5.64<H>    Ca    8.2<L>      23 Aug 2017 03:18  Phos  4.4     08-23  Mg     2.3     08-23    TPro  x   /  Alb  2.3<L>  /  TBili  x   /  DBili  x   /  AST  x   /  ALT  x   /  AlkPhos  x   08-23        Sodium, Serum: 142 (08-23 @ 03:18)  Sodium, Serum: 142 (08-22 @ 23:31)  Sodium, Serum: 140 (08-22 @ 15:51)    Creatinine, Serum: 5.64 (08-23 @ 03:18)  Creatinine, Serum: 4.15 (08-22 @ 23:31)  Creatinine, Serum: 9.83 (08-22 @ 15:51)    Potassium, Serum: 4.3 (08-23 @ 03:18)  Potassium, Serum: 3.5 (08-22 @ 23:31)  Potassium, Serum: 6.7 (08-22 @ 15:51)    Hemoglobin: 9.2 (08-23 @ 03:18)  Hemoglobin: 8.2 (08-22 @ 15:51)

## 2017-08-23 NOTE — PROGRESS NOTE ADULT - SUBJECTIVE AND OBJECTIVE BOX
INTERVAL HPI/OVERNIGHT EVENTS:   HPI:  67M PMH of HTN, DM, ESRD on [facility]ThS HD was on his way to get his HD this AM and was found to be bradycardic with HR in 20-40's.  Pt was advised to go to the ED at that time.  In the Ed the pt presented with bradycardia 30-40's and was found to be hyperkalemic with a potassium of 6.5.  Pt denies SOB, CP, palpitations, at this time.  Pt was admitted to the ICU for emergent HD, D50/insulin/calcium cocktail was given in the ED, and pacer pads were placed on the patient.     24hour: Patient got dialyzed. HR improved to 60's. Electrolytes corrected. Patient taken off cardene gtt which was placed for severe HTN post dialysis.      REVIEW OF SYSTEMS:  no complaints      ICU Vital Signs Last 24 Hrs  T(C): 37.1 (23 Aug 2017 15:19), Max: 37.1 (23 Aug 2017 15:19)  T(F): 98.8 (23 Aug 2017 15:19), Max: 98.8 (23 Aug 2017 15:19)  HR: 59 (23 Aug 2017 13:30) (45 - 76)  BP: 185/101 (23 Aug 2017 13:30) (122/91 - 242/69)  BP(mean): 122 (23 Aug 2017 13:30) (68 - 204)  ABP: --  ABP(mean): --  RR: 15 (23 Aug 2017 13:30) (15 - 32)  SpO2: 100% (23 Aug 2017 13:30) (83% - 100%)    PHYSICAL EXAM:    GENERAL: NAD, well-groomed, well-developed  HEAD:  Atraumatic, Normocephalic  EYES: EOMI  NERVOUS SYSTEM:  Alert & Oriented X3, Good concentration; Motor Strength 5/5 B/L upper and lower extremities  CHEST/LUNG: Clear to percussion bilaterally; No rales, rhonchi, wheezing, or rubs  HEART: Regular rate and rhythm; No murmurs, rubs, or gallops  ABDOMEN: Soft, Nontender, Nondistended; Bowel sounds present  EXTREMITIES:  2+ Peripheral Pulses, No edema. ambulating to chair unassisted  SKIN: No rashes or lesions      LABS:                        9.2    9.5   )-----------( 194      ( 23 Aug 2017 03:18 )             25.9          142  |  103  |  25<H>  ----------------------------<  87  4.3   |  26  |  5.64<H>    Ca    8.2<L>      23 Aug 2017 03:18  Phos  4.4       Mg     2.3         TPro  x   /  Alb  2.3<L>  /  TBili  x   /  DBili  x   /  AST  x   /  ALT  x   /  AlkPhos  x       PT/INR - ( 22 Aug 2017 15:52 )   PT: 10.4 sec;   INR: 0.96 ratio         PTT - ( 22 Aug 2017 15:52 )  PTT:30.1 sec  Urinalysis Basic - ( 23 Aug 2017 14:54 )    Color: Yellow / Appearance: Clear / S.010 / pH: x  Gluc: x / Ketone: Negative  / Bili: Negative / Urobili: Negative mg/dL   Blood: x / Protein: 500 mg/dL / Nitrite: Negative   Leuk Esterase: Negative / RBC: 0-2 /HPF / WBC x   Sq Epi: x / Non Sq Epi: x / Bacteria: x INTERVAL HPI/OVERNIGHT EVENTS:   HPI:  67M PMH of HTN, DM, ESRD on [facility]ThS HD was on his way to get his HD this AM and was found to be bradycardic with HR in 20-40's.  Pt was advised to go to the ED at that time.  In the Ed the pt presented with bradycardia 30-40's and was found to be hyperkalemic with a potassium of 6.5.  Pt denies SOB, CP, palpitations, at this time.  Pt was admitted to the ICU for emergent HD, D50/insulin/calcium cocktail was given in the ED, and pacer pads were placed on the patient.     24hour: Patient got dialyzed. HR improved to 60's. Electrolytes corrected. Patient taken off cardene gtt which was placed for severe HTN post dialysis.      REVIEW OF SYSTEMS:  no complaints      ICU Vital Signs Last 24 Hrs  T(C): 37.1 (23 Aug 2017 15:19), Max: 37.1 (23 Aug 2017 15:19)  T(F): 98.8 (23 Aug 2017 15:19), Max: 98.8 (23 Aug 2017 15:19)  HR: 59 (23 Aug 2017 13:30) (45 - 76)  BP: 185/101 (23 Aug 2017 13:30) (122/91 - 242/69)  BP(mean): 122 (23 Aug 2017 13:30) (68 - 204)  RR: 15 (23 Aug 2017 13:30) (15 - 32)  SpO2: 100% (23 Aug 2017 13:30) (83% - 100%)    PHYSICAL EXAM:    GENERAL: NAD, well-groomed, well-developed  HEAD:  Atraumatic, Normocephalic  EYES: EOMI  NERVOUS SYSTEM:  Alert & Oriented X3, Good concentration; Motor Strength 5/5 B/L upper and lower extremities  CHEST/LUNG: Clear to auscultation bilaterally; No rales, rhonchi, wheezing  HEART: Regular rate and rhythm; No murmurs, rubs  ABDOMEN: Soft, Nontender, Nondistended; Bowel sounds present  EXTREMITIES:  2+ Peripheral Pulses, No edema. ambulating to chair unassisted        LABS:                        9.2    9.5   )-----------( 194      ( 23 Aug 2017 03:18 )             25.9      08-23    142  |  103  |  25<H>  ----------------------------<  87  4.3   |  26  |  5.64<H>    Ca    8.2<L>      23 Aug 2017 03:18  Phos  4.4       Mg     2.3         TPro  x   /  Alb  2.3<L>       PT/INR - ( 22 Aug 2017 15:52 )   PT: 10.4 sec;   INR: 0.96 ratio         PTT - ( 22 Aug 2017 15:52 )  PTT:30.1 sec  Urinalysis Basic - ( 23 Aug 2017 14:54 )    Color: Yellow / Appearance: Clear / S.010 / Ketone: Negative  / Bili: Negative / Urobili: Negative mg/dL  / Protein: 500 mg/dL / Nitrite: Negative / Leuk Esterase: Negative / RBC: 0-2

## 2017-08-23 NOTE — DIETITIAN INITIAL EVALUATION ADULT. - OTHER INFO
Pt seen for ICU admission & ESRD on HD.  Pt & wife does cooking & food shopping. Pt unaware of diet restrictions. PTA.  Pt's wife not available to assist with diet hx.  Pt not wearing upper & lower dentures & reports no difficulty chewing or swallowing. Last BM ?. Noted last HD 8/22. Diabetic medications ?  Noted wife brought lunch this pm (beef soup & avocado

## 2017-08-23 NOTE — DIETITIAN INITIAL EVALUATION ADULT. - DIET TYPE
8/23/17/consistent carbohydrate (no snacks)/renal replacement pts:no protein restr,no conc K & phos, low sodium/low sodium

## 2017-08-23 NOTE — PROGRESS NOTE ADULT - ASSESSMENT
ASHD, DM, HTN, ESRD, resolved hyperkalemia and fluid overload, anemia. S/p bradycardia without hyperkalemic EKG changes. High BP. Will resume Amlodipine and Losartan. Keep off Labetalol pending Cardiology f/u. Advised to adhere to low Potassium diet. Spoke with patient's wife at bedside. Next dialysis on 8/25/17.

## 2017-08-24 LAB
ALBUMIN SERPL ELPH-MCNC: 2.1 G/DL — LOW (ref 3.3–5)
ANION GAP SERPL CALC-SCNC: 13 MMOL/L — SIGNIFICANT CHANGE UP (ref 5–17)
BUN SERPL-MCNC: 42 MG/DL — HIGH (ref 7–23)
CALCIUM SERPL-MCNC: 7.6 MG/DL — LOW (ref 8.5–10.1)
CHLORIDE SERPL-SCNC: 105 MMOL/L — SIGNIFICANT CHANGE UP (ref 96–108)
CO2 SERPL-SCNC: 24 MMOL/L — SIGNIFICANT CHANGE UP (ref 22–31)
CREAT SERPL-MCNC: 8.23 MG/DL — HIGH (ref 0.5–1.3)
CULTURE RESULTS: SIGNIFICANT CHANGE UP
GLUCOSE SERPL-MCNC: 86 MG/DL — SIGNIFICANT CHANGE UP (ref 70–99)
HCT VFR BLD CALC: 26.8 % — LOW (ref 39–50)
HGB BLD-MCNC: 9.1 G/DL — LOW (ref 13–17)
MAGNESIUM SERPL-MCNC: 2.4 MG/DL — SIGNIFICANT CHANGE UP (ref 1.6–2.6)
MCHC RBC-ENTMCNC: 30.6 PG — SIGNIFICANT CHANGE UP (ref 27–34)
MCHC RBC-ENTMCNC: 33.9 GM/DL — SIGNIFICANT CHANGE UP (ref 32–36)
MCV RBC AUTO: 90.2 FL — SIGNIFICANT CHANGE UP (ref 80–100)
PHOSPHATE SERPL-MCNC: 6.6 MG/DL — HIGH (ref 2.5–4.5)
PLATELET # BLD AUTO: 187 K/UL — SIGNIFICANT CHANGE UP (ref 150–400)
POTASSIUM SERPL-MCNC: 5.1 MMOL/L — SIGNIFICANT CHANGE UP (ref 3.5–5.3)
POTASSIUM SERPL-SCNC: 5.1 MMOL/L — SIGNIFICANT CHANGE UP (ref 3.5–5.3)
RBC # BLD: 2.96 M/UL — LOW (ref 4.2–5.8)
RBC # FLD: 13.4 % — SIGNIFICANT CHANGE UP (ref 11–15)
SODIUM SERPL-SCNC: 142 MMOL/L — SIGNIFICANT CHANGE UP (ref 135–145)
SPECIMEN SOURCE: SIGNIFICANT CHANGE UP
WBC # BLD: 9 K/UL — SIGNIFICANT CHANGE UP (ref 3.8–10.5)
WBC # FLD AUTO: 9 K/UL — SIGNIFICANT CHANGE UP (ref 3.8–10.5)

## 2017-08-24 RX ORDER — INSULIN LISPRO 100/ML
VIAL (ML) SUBCUTANEOUS
Qty: 0 | Refills: 0 | Status: DISCONTINUED | OUTPATIENT
Start: 2017-08-24 | End: 2017-08-25

## 2017-08-24 RX ADMIN — Medication 81 MILLIGRAM(S): at 11:31

## 2017-08-24 RX ADMIN — HEPARIN SODIUM 5000 UNIT(S): 5000 INJECTION INTRAVENOUS; SUBCUTANEOUS at 05:22

## 2017-08-24 RX ADMIN — Medication 667 MILLIGRAM(S): at 16:44

## 2017-08-24 RX ADMIN — Medication 100 MILLIGRAM(S): at 22:41

## 2017-08-24 RX ADMIN — Medication 100 MILLIGRAM(S): at 05:23

## 2017-08-24 RX ADMIN — Medication 667 MILLIGRAM(S): at 11:32

## 2017-08-24 RX ADMIN — Medication 667 MILLIGRAM(S): at 07:54

## 2017-08-24 RX ADMIN — CHLORHEXIDINE GLUCONATE 1 APPLICATION(S): 213 SOLUTION TOPICAL at 11:32

## 2017-08-24 RX ADMIN — Medication 100 MILLIGRAM(S): at 16:43

## 2017-08-24 RX ADMIN — LOSARTAN POTASSIUM 100 MILLIGRAM(S): 100 TABLET, FILM COATED ORAL at 05:23

## 2017-08-24 RX ADMIN — CLOPIDOGREL BISULFATE 75 MILLIGRAM(S): 75 TABLET, FILM COATED ORAL at 11:31

## 2017-08-24 RX ADMIN — AMLODIPINE BESYLATE 10 MILLIGRAM(S): 2.5 TABLET ORAL at 05:23

## 2017-08-24 RX ADMIN — ATORVASTATIN CALCIUM 80 MILLIGRAM(S): 80 TABLET, FILM COATED ORAL at 22:41

## 2017-08-24 RX ADMIN — HEPARIN SODIUM 5000 UNIT(S): 5000 INJECTION INTRAVENOUS; SUBCUTANEOUS at 17:09

## 2017-08-24 NOTE — PROGRESS NOTE ADULT - PROBLEM SELECTOR PLAN 2
Cardene gtt off. transition to PO meds.  On losartan 100mg / hydralazine 100mg TID / Norvasc 10mg / clonidine patch
Cardene gtt off. transition to PO meds.  PRN Hydralazine 10mg IV for elevated BP.

## 2017-08-24 NOTE — PROGRESS NOTE ADULT - PROBLEM SELECTOR PLAN 1
Resolved. 2/2 hyperkalemia.  s/p Dialysis  Stable for telemetry transfer.
Resolved. 2/2 hyperkalemia.  Will need continue telemetry monitoring.  On losartan 100mg / hydralazine 100mg TID / Norvasc 10mg

## 2017-08-24 NOTE — PHYSICAL THERAPY INITIAL EVALUATION ADULT - MODIFIED CLINICAL TEST OF SENSORY INTEGRATION IN BALANCE TEST
Barthel Index: Feeding Score _10__, Bathing Score _5__, Grooming Score _5__, Dressing Score _10__, Bowels Score _10__, Bladder Score _10__, Toilet Score _10__, Transfers Score _15__, Mobility Score _15__, Stairs Score _10__,     Total Score _100__

## 2017-08-24 NOTE — PROGRESS NOTE ADULT - SUBJECTIVE AND OBJECTIVE BOX
Assessment:  Severe Bradycardia improved  Setting is renal failure with K at 6.7  post  HD, correction of this lytes, HR now better  observe in CCU, will follow  HTN meds adjusted to include non rate  modifying agents  For transfer today out of ICU

## 2017-08-24 NOTE — PROGRESS NOTE ADULT - SUBJECTIVE AND OBJECTIVE BOX
INTERVAL HPI/OVERNIGHT EVENTS:   HPI:  67M PMH of HTN, DM, ESRD on TThS HD was on his way to get his HD this AM and was found to be bradycardic with HR in 20-40's.  Pt was advised to go to the ED at that time.  In the Ed the pt presented with bradycardia 30-40's and was found to be hyperkalemic with a potassium of 6.5.  Pt denies SOB, CP, palpitations, at this time.  Pt was admitted to the ICU for emergent HD, D50/insulin/calcium cocktail was given in the ED, and pacer pads were placed on the patient.     24hour: BP elevated overnight. Given Clonidine patch.      REVIEW OF SYSTEMS:  no complaints    ICU Vital Signs Last 24 Hrs  T(C): 35.9 (24 Aug 2017 11:50), Max: 36.8 (23 Aug 2017 19:03)  T(F): 96.7 (24 Aug 2017 11:50), Max: 98.2 (23 Aug 2017 19:03)  HR: 58 (24 Aug 2017 11:50) (55 - 78)  BP: 189/73 (24 Aug 2017 11:50) (156/60 - 203/62)  BP(mean): 91 (24 Aug 2017 11:00) (68 - 99)  ABP: --  ABP(mean): --  RR: 16 (24 Aug 2017 11:50) (14 - 31)  SpO2: 99% (24 Aug 2017 11:50) (88% - 100%)    PHYSICAL EXAM:    GENERAL: NAD, well-groomed, well-developed  HEAD:  Atraumatic, Normocephalic  EYES: EOMI  NERVOUS SYSTEM:  Alert & Oriented X3, Good concentration; Motor Strength 5/5 B/L upper and lower extremities  CHEST/LUNG: Clear to percussion bilaterally; No rales, rhonchi, wheezing, or rubs  HEART: Regular rate and rhythm; No murmurs, rubs, or gallops  ABDOMEN: Soft, Nontender, Nondistended; Bowel sounds present  EXTREMITIES:  2+ Peripheral Pulses, No edema. ambulating to chair unassisted  SKIN: No rashes or lesions      LABS:                        9.1    9.0   )-----------( 187      ( 24 Aug 2017 04:03 )             26.8      08-24    142  |  105  |  42<H>  ----------------------------<  86  5.1   |  24  |  8.23<H>    Ca    7.6<L>      24 Aug 2017 04:03  Phos  6.6       Mg     2.4         TPro  x   /  Alb  2.1<L>  /  TBili  x   /  DBili  x   /  AST  x   /  ALT  x   /  AlkPhos  x         Urinalysis Basic - ( 23 Aug 2017 14:54 )    Color: Yellow / Appearance: Clear / S.010 / pH: x  Gluc: x / Ketone: Negative  / Bili: Negative / Urobili: Negative mg/dL   Blood: x / Protein: 500 mg/dL / Nitrite: Negative   Leuk Esterase: Negative / RBC: 0-2 /HPF / WBC x   Sq Epi: x / Non Sq Epi: x / Bacteria: x INTERVAL HPI/OVERNIGHT EVENTS:   HPI:  67M PMH of HTN, DM, ESRD on TThS HD was on his way to get his HD this AM and was found to be bradycardic with HR in 20-40's.  Pt was advised to go to the ED at that time.  In the Ed the pt presented with bradycardia 30-40's and was found to be hyperkalemic with a potassium of 6.5.  Pt denies SOB, CP, palpitations, at this time.  Pt was admitted to the ICU for emergent HD, D50/insulin/calcium cocktail was given in the ED, and pacer pads were placed on the patient.     24hour: BP elevated overnight. Given Clonidine patch.      REVIEW OF SYSTEMS:  no complaints    ICU Vital Signs Last 24 Hrs  T(C): 35.9 (24 Aug 2017 11:50), Max: 36.8 (23 Aug 2017 19:03)  T(F): 96.7 (24 Aug 2017 11:50), Max: 98.2 (23 Aug 2017 19:03)  HR: 58 (24 Aug 2017 11:50) (55 - 78)  BP: 189/73 (24 Aug 2017 11:50) (156/60 - 203/62)  BP(mean): 91 (24 Aug 2017 11:00) (68 - 99)  RR: 16 (24 Aug 2017 11:50) (14 - 31)  SpO2: 99% (24 Aug 2017 11:50) (88% - 100%)    PHYSICAL EXAM:    GENERAL: NAD, well-groomed, well-developed  HEAD:  Atraumatic, Normocephalic  EYES: EOMI  NERVOUS SYSTEM:  Alert & Oriented X3, Good concentration; Motor Strength 5/5 B/L upper and lower extremities  CHEST/LUNG: Clear to auscultation bilaterally; No rales, rhonchi, wheezing  HEART: Regular rate and rhythm; No murmurs  ABDOMEN: Soft, Nontender, Nondistended; Bowel sounds present  EXTREMITIES:  2+ Peripheral Pulses, No edema. ambulating to chair unassisted        LABS:                        9.1    9.0   )-----------( 187      ( 24 Aug 2017 04:03 )             26.8      08-24    142  |  105  |  42<H>  ----------------------------<  86  5.1   |    |  8.23<H>    Ca    7.6<L>      24 Aug 2017 04:03  Phos  6.6     -  Mg     2.4     08-    Alb  2.1<L>  0824      Urinalysis Basic - ( 23 Aug 2017 14:54 )    Color: Yellow / Appearance: Clear / S.010 / pH: x  Gluc: x / Ketone: Negative  / Bili: Negative / Urobili: Negative mg/dL   Blood: x / Protein: 500 mg/dL / Nitrite: Negative   Leuk Esterase: Negative / RBC: 0-2 /HPF / WBC x   Sq Epi: x / Non Sq Epi: x / Bacteria: x

## 2017-08-24 NOTE — PROGRESS NOTE ADULT - ATTENDING COMMENTS
Pt seen and examined on morning rounds with team 8/23    67M PMH ESRD on HD, HTN, DM sent from dialysis center for bradycardia. Pt found to be in junctional bradycardia HR 20-30s with hyperkalemia K 6.7 Admitted to ICU for monitoring. s/p medical treatment with calcium, insulin/D50. s/p HD with resolution of hyperkalemia and bradycardia. Course with hypertensive urgency requiring cardene drip.    - off cardene drip x24 hours, cont PO meds lisinopril, losartan, hydralazine  - norvasc and clonidine added yesterday ; BP improved  - hyperkalemia resolved; junctional rhythm resolved, in sinus rhythm at present time  - HD today  - cont sliding scale coverage for DM  - stable for transfer to medical floor

## 2017-08-24 NOTE — PHYSICAL THERAPY INITIAL EVALUATION ADULT - CRITERIA FOR SKILLED THERAPEUTIC INTERVENTIONS
functional limitations in following categories/risk reduction/prevention/rehab potential/predicted duration of therapy intervention/therapy frequency/anticipated discharge recommendation/Home with Outpatient P.T./impairments found

## 2017-08-24 NOTE — PHYSICAL THERAPY INITIAL EVALUATION ADULT - GENERAL OBSERVATIONS, REHAB EVAL
Patient seen supine in bed with continuous pulseoximetry and telemetry monitor in place, with no apparent distress.

## 2017-08-24 NOTE — PROGRESS NOTE ADULT - PROBLEM SELECTOR PLAN 3
HD as scheduled.  check lytes and repleted as needed.
HD as scheduled.  check lytes and repleted as needed.

## 2017-08-24 NOTE — PROGRESS NOTE ADULT - SUBJECTIVE AND OBJECTIVE BOX
Subjective: comfortable. No complaints.       MEDICATIONS  (STANDING):  chlorhexidine 4% Liquid 1 Application(s) Topical daily  heparin  Injectable 5000 Unit(s) SubCutaneous every 12 hours  aspirin  chewable 81 milliGRAM(s) Oral daily  clopidogrel Tablet 75 milliGRAM(s) Oral daily  atorvastatin 80 milliGRAM(s) Oral at bedtime  dextrose 5%. 1000 milliLiter(s) (50 mL/Hr) IV Continuous <Continuous>  dextrose 50% Injectable 12.5 Gram(s) IV Push once  dextrose 50% Injectable 25 Gram(s) IV Push once  dextrose 50% Injectable 25 Gram(s) IV Push once  hydrALAZINE 100 milliGRAM(s) Oral every 8 hours  losartan      amLODIPine   Tablet 10 milliGRAM(s) Oral daily  calcium acetate 667 milliGRAM(s) Oral three times a day with meals  losartan 100 milliGRAM(s) Oral daily  cloNIDine Patch 0.1 mG/24Hr(s) 1 patch Topical every 7 days  insulin lispro (HumaLOG) corrective regimen sliding scale   SubCutaneous Before meals and at bedtime  cloNIDine 0.1 milliGRAM(s) Oral once    MEDICATIONS  (PRN):  dextrose Gel 1 Dose(s) Oral once PRN Blood Glucose LESS THAN 70 milliGRAM(s)/deciliter  glucagon  Injectable 1 milliGRAM(s) IntraMuscular once PRN Glucose LESS THAN 70 milligrams/deciliter          T(C): 36.4 (17 @ 07:52), Max: 37.1 (17 @ 15:19)  HR: 63 (17 @ 07:00) (59 - 78)  BP: 160/63 (17 @ 07:00) (146/55 - 226/197)  RR: 14 (17 @ 07:00) (14 - 31)  SpO2: 99% (17 @ 07:00) (83% - 100%)  Wt(kg): --        I&O's Detail    23 Aug 2017 07:01  -  24 Aug 2017 07:00  --------------------------------------------------------  IN:    niCARdipine Infusion: 15 mL    Oral Fluid: 600 mL  Total IN: 615 mL    OUT:    Voided: 450 mL  Total OUT: 450 mL    Total NET: 165 mL               PHYSICAL EXAM:    GENERAL: comfortable  EYES: EOMI, PERRLA, conjunctiva and sclera clear  NECK: Supple, no inc in JVP  CHEST/LUNG: Clear  HEART: S1S2  ABDOMEN: Soft, Nontender, Nondistended; Bowel sounds present  EXTREMITIES:  no edema  NEURO: no asterixis  ACCESS: R chest PC      LABS:  CBC Full  -  ( 24 Aug 2017 04:03 )  WBC Count : 9.0 K/uL  Hemoglobin : 9.1 g/dL  Hematocrit : 26.8 %  Platelet Count - Automated : 187 K/uL  Mean Cell Volume : 90.2 fl  Mean Cell Hemoglobin : 30.6 pg  Mean Cell Hemoglobin Concentration : 33.9 gm/dL  Auto Neutrophil # : x  Auto Lymphocyte # : x  Auto Monocyte # : x  Auto Eosinophil # : x  Auto Basophil # : x  Auto Neutrophil % : x  Auto Lymphocyte % : x  Auto Monocyte % : x  Auto Eosinophil % : x  Auto Basophil % : x    -    142  |  105  |  42<H>  ----------------------------<  86  5.1   |  24  |  8.23<H>    Ca    7.6<L>      24 Aug 2017 04:03  Phos  6.6     -  Mg     2.4     -24    TPro  x   /  Alb  2.1<L>  /  TBili  x   /  DBili  x   /  AST  x   /  ALT  x   /  AlkPhos  x   -24    PT/INR - ( 22 Aug 2017 15:52 )   PT: 10.4 sec;   INR: 0.96 ratio         PTT - ( 22 Aug 2017 15:52 )  PTT:30.1 sec  Urinalysis Basic - ( 23 Aug 2017 14:54 )    Color: Yellow / Appearance: Clear / S.010 / pH: x  Gluc: x / Ketone: Negative  / Bili: Negative / Urobili: Negative mg/dL   Blood: x / Protein: 500 mg/dL / Nitrite: Negative   Leuk Esterase: Negative / RBC: 0-2 /HPF / WBC x   Sq Epi: x / Non Sq Epi: x / Bacteria: x        ASSESSMENT and PLAN:  * Severe hyperK on admission -- resolved. Renal/Low K diet  * Bradycardia -- unclear if related to high K. Cardio w/u in progress  * Uncontrolled HTN -- improved. Follow back on ARB. Monitor Response to UF  * Maint HD today as Rxed. Goal UF 2-2.5kg. 2K bath

## 2017-08-25 VITALS — DIASTOLIC BLOOD PRESSURE: 72 MMHG | HEART RATE: 80 BPM | SYSTOLIC BLOOD PRESSURE: 179 MMHG | TEMPERATURE: 98 F

## 2017-08-25 LAB
ALBUMIN SERPL ELPH-MCNC: 2.4 G/DL — LOW (ref 3.3–5)
ALP SERPL-CCNC: 118 U/L — SIGNIFICANT CHANGE UP (ref 40–120)
ALT FLD-CCNC: 20 U/L — SIGNIFICANT CHANGE UP (ref 12–78)
ANION GAP SERPL CALC-SCNC: 10 MMOL/L — SIGNIFICANT CHANGE UP (ref 5–17)
AST SERPL-CCNC: 24 U/L — SIGNIFICANT CHANGE UP (ref 15–37)
BILIRUB SERPL-MCNC: 0.4 MG/DL — SIGNIFICANT CHANGE UP (ref 0.2–1.2)
BUN SERPL-MCNC: 25 MG/DL — HIGH (ref 7–23)
CALCIUM SERPL-MCNC: 8.1 MG/DL — LOW (ref 8.5–10.1)
CHLORIDE SERPL-SCNC: 103 MMOL/L — SIGNIFICANT CHANGE UP (ref 96–108)
CO2 SERPL-SCNC: 29 MMOL/L — SIGNIFICANT CHANGE UP (ref 22–31)
CREAT SERPL-MCNC: 5.89 MG/DL — HIGH (ref 0.5–1.3)
GLUCOSE SERPL-MCNC: 80 MG/DL — SIGNIFICANT CHANGE UP (ref 70–99)
HAV IGM SER-ACNC: SIGNIFICANT CHANGE UP
HBV CORE IGM SER-ACNC: SIGNIFICANT CHANGE UP
HBV SURFACE AB SER-ACNC: REACTIVE
HBV SURFACE AG SER-ACNC: SIGNIFICANT CHANGE UP
HCT VFR BLD CALC: 30.2 % — LOW (ref 39–50)
HCV AB S/CO SERPL IA: 0.13 S/CO — SIGNIFICANT CHANGE UP
HCV AB SERPL-IMP: SIGNIFICANT CHANGE UP
HGB BLD-MCNC: 10.1 G/DL — LOW (ref 13–17)
MAGNESIUM SERPL-MCNC: 2.3 MG/DL — SIGNIFICANT CHANGE UP (ref 1.6–2.6)
MCHC RBC-ENTMCNC: 30 PG — SIGNIFICANT CHANGE UP (ref 27–34)
MCHC RBC-ENTMCNC: 33.6 GM/DL — SIGNIFICANT CHANGE UP (ref 32–36)
MCV RBC AUTO: 89.3 FL — SIGNIFICANT CHANGE UP (ref 80–100)
PHOSPHATE SERPL-MCNC: 4.8 MG/DL — HIGH (ref 2.5–4.5)
PLATELET # BLD AUTO: 214 K/UL — SIGNIFICANT CHANGE UP (ref 150–400)
POTASSIUM SERPL-MCNC: 4.7 MMOL/L — SIGNIFICANT CHANGE UP (ref 3.5–5.3)
POTASSIUM SERPL-SCNC: 4.7 MMOL/L — SIGNIFICANT CHANGE UP (ref 3.5–5.3)
PROT SERPL-MCNC: 6.3 GM/DL — SIGNIFICANT CHANGE UP (ref 6–8.3)
RBC # BLD: 3.38 M/UL — LOW (ref 4.2–5.8)
RBC # FLD: 13.3 % — SIGNIFICANT CHANGE UP (ref 11–15)
SODIUM SERPL-SCNC: 142 MMOL/L — SIGNIFICANT CHANGE UP (ref 135–145)
WBC # BLD: 8 K/UL — SIGNIFICANT CHANGE UP (ref 3.8–10.5)
WBC # FLD AUTO: 8 K/UL — SIGNIFICANT CHANGE UP (ref 3.8–10.5)

## 2017-08-25 PROCEDURE — 99239 HOSP IP/OBS DSCHRG MGMT >30: CPT

## 2017-08-25 RX ORDER — ATORVASTATIN CALCIUM 80 MG/1
1 TABLET, FILM COATED ORAL
Qty: 30 | Refills: 0 | OUTPATIENT
Start: 2017-08-25 | End: 2017-09-24

## 2017-08-25 RX ORDER — CLOPIDOGREL BISULFATE 75 MG/1
1 TABLET, FILM COATED ORAL
Qty: 30 | Refills: 0 | OUTPATIENT
Start: 2017-08-25 | End: 2017-09-24

## 2017-08-25 RX ORDER — FAMOTIDINE 10 MG/ML
1 INJECTION INTRAVENOUS
Qty: 60 | Refills: 0 | OUTPATIENT
Start: 2017-08-25 | End: 2017-09-24

## 2017-08-25 RX ORDER — CALCIUM ACETATE 667 MG
1 TABLET ORAL
Qty: 30 | Refills: 0 | OUTPATIENT
Start: 2017-08-25 | End: 2017-09-24

## 2017-08-25 RX ORDER — FAMOTIDINE 10 MG/ML
1 INJECTION INTRAVENOUS
Qty: 0 | Refills: 0 | COMMUNITY

## 2017-08-25 RX ORDER — LISINOPRIL 2.5 MG/1
1 TABLET ORAL
Qty: 0 | Refills: 0 | COMMUNITY

## 2017-08-25 RX ORDER — LISINOPRIL 2.5 MG/1
1 TABLET ORAL
Qty: 30 | Refills: 0 | OUTPATIENT
Start: 2017-08-25 | End: 2017-09-24

## 2017-08-25 RX ORDER — ASPIRIN/CALCIUM CARB/MAGNESIUM 324 MG
1 TABLET ORAL
Qty: 30 | Refills: 0 | OUTPATIENT
Start: 2017-08-25 | End: 2017-09-24

## 2017-08-25 RX ORDER — HYDRALAZINE HCL 50 MG
1 TABLET ORAL
Qty: 0 | Refills: 0 | COMMUNITY

## 2017-08-25 RX ORDER — HYDRALAZINE HCL 50 MG
10 TABLET ORAL ONCE
Qty: 0 | Refills: 0 | Status: COMPLETED | OUTPATIENT
Start: 2017-08-25 | End: 2017-08-25

## 2017-08-25 RX ORDER — CLOPIDOGREL BISULFATE 75 MG/1
1 TABLET, FILM COATED ORAL
Qty: 0 | Refills: 0 | COMMUNITY

## 2017-08-25 RX ORDER — HYDRALAZINE HCL 50 MG
1 TABLET ORAL
Qty: 90 | Refills: 0 | OUTPATIENT
Start: 2017-08-25 | End: 2017-09-24

## 2017-08-25 RX ORDER — AMLODIPINE BESYLATE 2.5 MG/1
1 TABLET ORAL
Qty: 30 | Refills: 0 | OUTPATIENT
Start: 2017-08-25 | End: 2017-09-24

## 2017-08-25 RX ADMIN — AMLODIPINE BESYLATE 10 MILLIGRAM(S): 2.5 TABLET ORAL at 06:07

## 2017-08-25 RX ADMIN — Medication 667 MILLIGRAM(S): at 07:38

## 2017-08-25 RX ADMIN — HEPARIN SODIUM 5000 UNIT(S): 5000 INJECTION INTRAVENOUS; SUBCUTANEOUS at 17:00

## 2017-08-25 RX ADMIN — Medication 100 MILLIGRAM(S): at 14:41

## 2017-08-25 RX ADMIN — HEPARIN SODIUM 5000 UNIT(S): 5000 INJECTION INTRAVENOUS; SUBCUTANEOUS at 06:07

## 2017-08-25 RX ADMIN — CLOPIDOGREL BISULFATE 75 MILLIGRAM(S): 75 TABLET, FILM COATED ORAL at 11:36

## 2017-08-25 RX ADMIN — Medication 1 PATCH: at 18:10

## 2017-08-25 RX ADMIN — CHLORHEXIDINE GLUCONATE 1 APPLICATION(S): 213 SOLUTION TOPICAL at 11:47

## 2017-08-25 RX ADMIN — LOSARTAN POTASSIUM 100 MILLIGRAM(S): 100 TABLET, FILM COATED ORAL at 06:07

## 2017-08-25 RX ADMIN — Medication 100 MILLIGRAM(S): at 06:07

## 2017-08-25 RX ADMIN — Medication 81 MILLIGRAM(S): at 11:36

## 2017-08-25 RX ADMIN — Medication 10 MILLIGRAM(S): at 17:00

## 2017-08-25 RX ADMIN — Medication 667 MILLIGRAM(S): at 17:00

## 2017-08-25 RX ADMIN — Medication 667 MILLIGRAM(S): at 11:36

## 2017-08-25 NOTE — PROGRESS NOTE ADULT - SUBJECTIVE AND OBJECTIVE BOX
Assessment:  Severe Bradycardia improved  Setting is renal failure with K at 6.7  post  HD, correction of this lytes, HR now better  observe in CCU, will follow  HTN meds adjusted to include non rate  modifying agents  transfered out of ICU

## 2017-08-25 NOTE — DISCHARGE NOTE ADULT - MEDICATION SUMMARY - MEDICATIONS TO CHANGE
I will SWITCH the dose or number of times a day I take the medications listed below when I get home from the hospital:    hydrALAZINE 50 mg oral tablet  -- 1 tab(s) by mouth 4 times a day

## 2017-08-25 NOTE — DISCHARGE NOTE ADULT - CARE PROVIDER_API CALL
Anthony Maciel (), Nephrology  [address]  [address]  [place], [place] [id number]  Phone: [phone]  Fax: [phone]    Frandy Hendrickson), Cardiology  [address] [facility]  [address]  [place] [address]  Phone: [phone]  Fax: [phone]    PMD,   Phone: (   )    -  Fax: (   )    -

## 2017-08-25 NOTE — PROGRESS NOTE ADULT - SUBJECTIVE AND OBJECTIVE BOX
Subjective: no complaints. Eager to go home      MEDICATIONS  (STANDING):  chlorhexidine 4% Liquid 1 Application(s) Topical daily  heparin  Injectable 5000 Unit(s) SubCutaneous every 12 hours  aspirin  chewable 81 milliGRAM(s) Oral daily  clopidogrel Tablet 75 milliGRAM(s) Oral daily  atorvastatin 80 milliGRAM(s) Oral at bedtime  dextrose 5%. 1000 milliLiter(s) (50 mL/Hr) IV Continuous <Continuous>  dextrose 50% Injectable 12.5 Gram(s) IV Push once  dextrose 50% Injectable 25 Gram(s) IV Push once  dextrose 50% Injectable 25 Gram(s) IV Push once  hydrALAZINE 100 milliGRAM(s) Oral every 8 hours  losartan      amLODIPine   Tablet 10 milliGRAM(s) Oral daily  calcium acetate 667 milliGRAM(s) Oral three times a day with meals  losartan 100 milliGRAM(s) Oral daily  cloNIDine Patch 0.1 mG/24Hr(s) 1 patch Topical every 7 days  insulin lispro (HumaLOG) corrective regimen sliding scale   SubCutaneous Before meals and at bedtime    MEDICATIONS  (PRN):  dextrose Gel 1 Dose(s) Oral once PRN Blood Glucose LESS THAN 70 milliGRAM(s)/deciliter  glucagon  Injectable 1 milliGRAM(s) IntraMuscular once PRN Glucose LESS THAN 70 milligrams/deciliter          T(C): 37 (17 @ 05:15), Max: 37 (17 @ 05:15)  HR: 68 (17 @ 05:15) (54 - 74)  BP: 158/71 (17 @ 05:15) (149/80 - 196/86)  RR: 17 (17 @ 05:15) (14 - 21)  SpO2: 94% (17 @ 05:15) (94% - 100%)  Wt(kg): --        I&O's Detail    24 Aug 2017 07:01  -  25 Aug 2017 07:00  --------------------------------------------------------  IN:    Oral Fluid: 200 mL  Total IN: 200 mL    OUT:    Other: 2500 mL    Voided: 150 mL  Total OUT: 2650 mL    Total NET: -2450 mL      25 Aug 2017 07:01  -  25 Aug 2017 10:08  --------------------------------------------------------  IN:    Oral Fluid: 300 mL  Total IN: 300 mL    OUT:  Total OUT: 0 mL    Total NET: 300 mL               PHYSICAL EXAM:    GENERAL: comfortable.   EYES: EOMI, PERRLA, conjunctiva and sclera clear  NECK: Supple, no inc in JVP  CHEST/LUNG: Clear  HEART: S1S2  ABDOMEN: Soft, Nontender, Nondistended; Bowel sounds present  EXTREMITIES:  No edema  NEURO: no asterixis  ACCESS: R chest PC      LABS:  CBC Full  -  ( 25 Aug 2017 07:51 )  WBC Count : 8.0 K/uL  Hemoglobin : 10.1 g/dL  Hematocrit : 30.2 %  Platelet Count - Automated : 214 K/uL  Mean Cell Volume : 89.3 fl  Mean Cell Hemoglobin : 30.0 pg  Mean Cell Hemoglobin Concentration : 33.6 gm/dL  Auto Neutrophil # : x  Auto Lymphocyte # : x  Auto Monocyte # : x  Auto Eosinophil # : x  Auto Basophil # : x  Auto Neutrophil % : x  Auto Lymphocyte % : x  Auto Monocyte % : x  Auto Eosinophil % : x  Auto Basophil % : x        142  |  103  |  25<H>  ----------------------------<  80  4.7   |  29  |  5.89<H>    Ca    8.1<L>      25 Aug 2017 07:51  Phos  4.8       Mg     2.3         TPro  6.3  /  Alb  2.4<L>  /  TBili  0.4  /  DBili  x   /  AST  24  /  ALT  20  /  AlkPhos  118        Urinalysis Basic - ( 23 Aug 2017 14:54 )    Color: Yellow / Appearance: Clear / S.010 / pH: x  Gluc: x / Ketone: Negative  / Bili: Negative / Urobili: Negative mg/dL   Blood: x / Protein: 500 mg/dL / Nitrite: Negative   Leuk Esterase: Negative / RBC: 0-2 /HPF / WBC x   Sq Epi: x / Non Sq Epi: x / Bacteria: x        ASSESSMENT and PLAN:    * Severe hyperK on admission -- resolved. Renal/Low K diet  * Bradycardia -- unclear if related to high K. Cardio w/u in progress  * Uncontrolled HTN -- improved. Follow back on ARB. Monitor Response to UF  * Maint HD tomorrow as Rxed. Goal UF 2-2.5kg. 2K bath

## 2017-08-25 NOTE — DISCHARGE NOTE ADULT - HOSPITAL COURSE
67M PMH of HTN, DM, ESRD on TThS HD was on his way to get his HD this AM and was found to be bradycardic with HR in 20-40's.  Pt was advised to go to the ED at that time.  In the Ed the pt presented with bradycardia 30-40's and was found to be hyperkalemic with a potassium of 6.5.  Pt denies SOB, CP, palpitations, at this time.  Pt was admitted to the ICU for emergent HD, D50/insulin/calcium cocktail was given in the ED, and pacer pads were placed on the patient. Will f/u with post dialysis BMP's and monitor HR. Pt stable for discharge home and to continue HD w/ follow up with cardiologist and PMD. 67M PMH of HTN, DM, ESRD on TThS HD was on his way to get his HD this AM and was found to be bradycardic with HR in 20-40's.  Pt was advised to go to the ED at that time.  In the Ed the pt presented with bradycardia 30-40's and was found to be hyperkalemic with a potassium of 6.5.  Pt denies SOB, CP, palpitations, at this time.  Pt was admitted to the ICU for emergent HD, D50/insulin/calcium cocktail was given in the ED, and pacer pads were placed on the patient. Will f/u with post dialysis BMP's and monitor HR. Pt. also had hypertensive urgency and blood pressure medicines were adjusted, including clonidine patch added.  Bradycardia resolved and thought to be related to electrolyte abnormalities. No further work-up recommended by cardiology.

## 2017-08-25 NOTE — CHART NOTE - NSCHARTNOTEFT_GEN_A_CORE
nurse called  to say pt received oral hydralazine 100 mg at 2 pm ofr bp systolic 180 and now 430pm bp systolic 195 hr low 70. order 10mg iv hydralazine now with repeat bp in 30 minutes.
67M PMH of HTN, DM, ESRD on TThS HD was on his way to get his HD and was found to be bradycardic with HR in 20-40's.  Pt was advised to go to the ED at that time.  In the Ed the pt presented with bradycardia 30-40's and was found to be hyperkalemic with a potassium of 6.5.  Pt denies SOB, CP, palpitations, at this time.  Pt was admitted to the ICU for emergent HD, D50/insulin/calcium cocktail was given in the ED, and pacer pads were placed on the patient. Will f/u with post dialysis BMP's and monitor HR    Patient was dialyzed. HR improved to 60's. Electrolytes corrected. Patient taken off cardene gtt which was placed for severe HTN post dialysis    Cardene gtt off. transitioned to PO meds. PRN Hydralazine 10mg IV for elevated BP.    Order for pt to be transferred. Pt signed out to Thee from medicine.

## 2017-08-25 NOTE — DISCHARGE NOTE ADULT - PLAN OF CARE
Resolved Follow up with PMD and cardiologist Continue home blood pressure medications  Follow up with PCP  Low-sodium diet  AHA Recipes - [email or web address] Follow up with nephrologist  Continue HD Follow up with nephrologist  Continue dialysis

## 2017-08-25 NOTE — DISCHARGE NOTE ADULT - CARE PROVIDERS DIRECT ADDRESSES
,DirectAddress_Unknown,[email or web address],DirectAddress_Unknown

## 2017-08-25 NOTE — DISCHARGE NOTE ADULT - PATIENT PORTAL LINK FT
“You can access the FollowHealth Patient Portal, offered by [facility], by registering with the following website: [email or web address]”

## 2017-08-25 NOTE — DISCHARGE NOTE ADULT - MEDICATION SUMMARY - MEDICATIONS TO TAKE
I will START or STAY ON the medications listed below when I get home from the hospital:    aspirin 81 mg oral tablet, chewable  -- 1 tab(s) by mouth once a day  -- Indication: For cardioprotective    lisinopril 20 mg oral tablet  -- 1 tab(s) by mouth once a day  -- Indication: For Essential hypertension    cloNIDine 0.1 mg/24 hr transdermal film, extended release  -- 1 patch transdermalevery 7 days  -- Indication: For Essential hypertension    atorvastatin 80 mg oral tablet  -- 1 tab(s) by mouth once a day (at bedtime)  -- Indication: For hyperlipidemia    Plavix 75 mg oral tablet  -- 1 tab(s) by mouth once a day  -- Indication: For cardiac    amLODIPine 10 mg oral tablet  -- 1 tab(s) by mouth once a day  -- Indication: For Essential hypertension    famotidine 20 mg oral tablet  -- 1 tab(s) by mouth 2 times a day  -- Indication: For GERD    calcium acetate 667 mg oral tablet  -- 1 tab(s) by mouth once a day  -- Indication: For ESRD (end stage renal disease) on dialysis    hydrALAZINE 100 mg oral tablet  -- 1 tab(s) by mouth every 8 hours  -- Indication: For Essential hypertension

## 2017-08-25 NOTE — DISCHARGE NOTE ADULT - CARE PLAN
Principal Discharge DX:	Bradycardia  Goal:	Resolved  Instructions for follow-up, activity and diet:	Follow up with PMD and cardiologist  Secondary Diagnosis:	Essential hypertension  Instructions for follow-up, activity and diet:	Continue home blood pressure medications  Follow up with PCP  Low-sodium diet  AHA Recipes - [email or web address]  Secondary Diagnosis:	ESRD (end stage renal disease) on dialysis  Instructions for follow-up, activity and diet:	Follow up with nephrologist  Continue HD Principal Discharge DX:	Bradycardia  Goal:	Resolved  Instructions for follow-up, activity and diet:	Follow up with PMD and cardiologist  Secondary Diagnosis:	Essential hypertension  Instructions for follow-up, activity and diet:	Continue home blood pressure medications  Follow up with PCP  Low-sodium diet  AHA Recipes - [email or web address]  Secondary Diagnosis:	ESRD (end stage renal disease) on dialysis  Instructions for follow-up, activity and diet:	Follow up with nephrologist  Continue dialysis

## 2017-08-29 DIAGNOSIS — R00.1 BRADYCARDIA, UNSPECIFIED: ICD-10-CM

## 2017-08-29 DIAGNOSIS — I13.11 HYPERTENSIVE HEART AND CHRONIC KIDNEY DISEASE WITHOUT HEART FAILURE, WITH STAGE 5 CHRONIC KIDNEY DISEASE, OR END STAGE RENAL DISEASE: ICD-10-CM

## 2017-08-29 DIAGNOSIS — I25.10 ATHEROSCLEROTIC HEART DISEASE OF NATIVE CORONARY ARTERY WITHOUT ANGINA PECTORIS: ICD-10-CM

## 2017-08-29 DIAGNOSIS — Z79.82 LONG TERM (CURRENT) USE OF ASPIRIN: ICD-10-CM

## 2017-08-29 DIAGNOSIS — Z87.891 PERSONAL HISTORY OF NICOTINE DEPENDENCE: ICD-10-CM

## 2017-08-29 DIAGNOSIS — E78.5 HYPERLIPIDEMIA, UNSPECIFIED: ICD-10-CM

## 2017-08-29 DIAGNOSIS — D64.9 ANEMIA, UNSPECIFIED: ICD-10-CM

## 2017-08-29 DIAGNOSIS — E11.9 TYPE 2 DIABETES MELLITUS WITHOUT COMPLICATIONS: ICD-10-CM

## 2017-08-29 DIAGNOSIS — Z79.02 LONG TERM (CURRENT) USE OF ANTITHROMBOTICS/ANTIPLATELETS: ICD-10-CM

## 2017-08-29 DIAGNOSIS — Z91.11 PATIENT'S NONCOMPLIANCE WITH DIETARY REGIMEN: ICD-10-CM

## 2017-08-29 DIAGNOSIS — N18.6 END STAGE RENAL DISEASE: ICD-10-CM

## 2017-08-29 DIAGNOSIS — Z99.2 DEPENDENCE ON RENAL DIALYSIS: ICD-10-CM

## 2017-08-29 DIAGNOSIS — K21.9 GASTRO-ESOPHAGEAL REFLUX DISEASE WITHOUT ESOPHAGITIS: ICD-10-CM

## 2017-08-29 DIAGNOSIS — E87.5 HYPERKALEMIA: ICD-10-CM

## 2018-11-27 ENCOUNTER — INPATIENT (INPATIENT)
Facility: HOSPITAL | Age: 68
LOS: 9 days | Discharge: HOME HEALTH | End: 2018-12-07
Attending: INTERNAL MEDICINE | Admitting: INTERNAL MEDICINE
Payer: MEDICARE

## 2018-11-27 VITALS
OXYGEN SATURATION: 99 % | DIASTOLIC BLOOD PRESSURE: 55 MMHG | SYSTOLIC BLOOD PRESSURE: 101 MMHG | TEMPERATURE: 97 F | RESPIRATION RATE: 17 BRPM | HEART RATE: 67 BPM

## 2018-11-27 DIAGNOSIS — N18.6 END STAGE RENAL DISEASE: ICD-10-CM

## 2018-11-27 DIAGNOSIS — D64.9 ANEMIA, UNSPECIFIED: ICD-10-CM

## 2018-11-27 DIAGNOSIS — R14.0 ABDOMINAL DISTENSION (GASEOUS): ICD-10-CM

## 2018-11-27 DIAGNOSIS — I10 ESSENTIAL (PRIMARY) HYPERTENSION: ICD-10-CM

## 2018-11-27 DIAGNOSIS — N18.9 CHRONIC KIDNEY DISEASE, UNSPECIFIED: ICD-10-CM

## 2018-11-27 LAB
ALBUMIN SERPL ELPH-MCNC: 3.3 G/DL — SIGNIFICANT CHANGE UP (ref 3.3–5)
ALP SERPL-CCNC: 269 U/L — HIGH (ref 40–120)
ALT FLD-CCNC: 16 U/L — SIGNIFICANT CHANGE UP (ref 4–41)
ANISOCYTOSIS BLD QL: SLIGHT — SIGNIFICANT CHANGE UP
AST SERPL-CCNC: 36 U/L — SIGNIFICANT CHANGE UP (ref 4–40)
BASE EXCESS BLDV CALC-SCNC: 5 MMOL/L — SIGNIFICANT CHANGE UP
BASOPHILS # BLD AUTO: 0.05 K/UL — SIGNIFICANT CHANGE UP (ref 0–0.2)
BASOPHILS NFR BLD AUTO: 0.4 % — SIGNIFICANT CHANGE UP (ref 0–2)
BASOPHILS NFR SPEC: 0 % — SIGNIFICANT CHANGE UP (ref 0–2)
BILIRUB SERPL-MCNC: 0.7 MG/DL — SIGNIFICANT CHANGE UP (ref 0.2–1.2)
BLASTS # FLD: 0 % — SIGNIFICANT CHANGE UP (ref 0–0)
BLOOD GAS VENOUS - CREATININE: 6.77 MG/DL — HIGH (ref 0.5–1.3)
BUN SERPL-MCNC: 34 MG/DL — HIGH (ref 7–23)
CALCIUM SERPL-MCNC: 9.8 MG/DL — SIGNIFICANT CHANGE UP (ref 8.4–10.5)
CHLORIDE BLDV-SCNC: 103 MMOL/L — SIGNIFICANT CHANGE UP (ref 96–108)
CHLORIDE SERPL-SCNC: 101 MMOL/L — SIGNIFICANT CHANGE UP (ref 98–107)
CO2 SERPL-SCNC: 28 MMOL/L — SIGNIFICANT CHANGE UP (ref 22–31)
CREAT SERPL-MCNC: 6.44 MG/DL — HIGH (ref 0.5–1.3)
EOSINOPHIL # BLD AUTO: 3.03 K/UL — HIGH (ref 0–0.5)
EOSINOPHIL NFR BLD AUTO: 27.1 % — HIGH (ref 0–6)
EOSINOPHIL NFR FLD: 23.9 % — HIGH (ref 0–6)
GAS PNL BLDV: 140 MMOL/L — SIGNIFICANT CHANGE UP (ref 136–146)
GIANT PLATELETS BLD QL SMEAR: PRESENT — SIGNIFICANT CHANGE UP
GLUCOSE BLDV-MCNC: 101 — HIGH (ref 70–99)
GLUCOSE SERPL-MCNC: 98 MG/DL — SIGNIFICANT CHANGE UP (ref 70–99)
HBV SURFACE AG SER-ACNC: NEGATIVE — SIGNIFICANT CHANGE UP
HCO3 BLDV-SCNC: 27 MMOL/L — SIGNIFICANT CHANGE UP (ref 20–27)
HCT VFR BLD CALC: 36.6 % — LOW (ref 39–50)
HCT VFR BLDV CALC: 35.6 % — LOW (ref 39–51)
HGB BLD-MCNC: 11.7 G/DL — LOW (ref 13–17)
HGB BLDV-MCNC: 11.6 G/DL — LOW (ref 13–17)
IMM GRANULOCYTES # BLD AUTO: 0.02 # — SIGNIFICANT CHANGE UP
IMM GRANULOCYTES NFR BLD AUTO: 0.2 % — SIGNIFICANT CHANGE UP (ref 0–1.5)
LACTATE BLDV-MCNC: 2.1 MMOL/L — HIGH (ref 0.5–2)
LIDOCAIN IGE QN: 60.6 U/L — HIGH (ref 7–60)
LYMPHOCYTES # BLD AUTO: 1.95 K/UL — SIGNIFICANT CHANGE UP (ref 1–3.3)
LYMPHOCYTES # BLD AUTO: 17.5 % — SIGNIFICANT CHANGE UP (ref 13–44)
LYMPHOCYTES NFR SPEC AUTO: 8.9 % — LOW (ref 13–44)
MACROCYTES BLD QL: SLIGHT — SIGNIFICANT CHANGE UP
MCHC RBC-ENTMCNC: 30.2 PG — SIGNIFICANT CHANGE UP (ref 27–34)
MCHC RBC-ENTMCNC: 32 % — SIGNIFICANT CHANGE UP (ref 32–36)
MCV RBC AUTO: 94.3 FL — SIGNIFICANT CHANGE UP (ref 80–100)
METAMYELOCYTES # FLD: 0 % — SIGNIFICANT CHANGE UP (ref 0–1)
MONOCYTES # BLD AUTO: 0.45 K/UL — SIGNIFICANT CHANGE UP (ref 0–0.9)
MONOCYTES NFR BLD AUTO: 4 % — SIGNIFICANT CHANGE UP (ref 2–14)
MONOCYTES NFR BLD: 3.5 % — SIGNIFICANT CHANGE UP (ref 2–9)
MYELOCYTES NFR BLD: 0 % — SIGNIFICANT CHANGE UP (ref 0–0)
NEUTROPHIL AB SER-ACNC: 60.2 % — SIGNIFICANT CHANGE UP (ref 43–77)
NEUTROPHILS # BLD AUTO: 5.67 K/UL — SIGNIFICANT CHANGE UP (ref 1.8–7.4)
NEUTROPHILS NFR BLD AUTO: 50.8 % — SIGNIFICANT CHANGE UP (ref 43–77)
NEUTS BAND # BLD: 0 % — SIGNIFICANT CHANGE UP (ref 0–6)
NRBC # FLD: 0.02 — SIGNIFICANT CHANGE UP
OTHER - HEMATOLOGY %: 0 — SIGNIFICANT CHANGE UP
OVALOCYTES BLD QL SMEAR: SLIGHT — SIGNIFICANT CHANGE UP
PCO2 BLDV: 55 MMHG — HIGH (ref 41–51)
PH BLDV: 7.36 PH — SIGNIFICANT CHANGE UP (ref 7.32–7.43)
PHOSPHATE SERPL-MCNC: 2.7 MG/DL — SIGNIFICANT CHANGE UP (ref 2.5–4.5)
PLATELET # BLD AUTO: 166 K/UL — SIGNIFICANT CHANGE UP (ref 150–400)
PLATELET COUNT - ESTIMATE: NORMAL — SIGNIFICANT CHANGE UP
PMV BLD: 12 FL — SIGNIFICANT CHANGE UP (ref 7–13)
PO2 BLDV: 19 MMHG — LOW (ref 35–40)
POIKILOCYTOSIS BLD QL AUTO: SIGNIFICANT CHANGE UP
POLYCHROMASIA BLD QL SMEAR: SLIGHT — SIGNIFICANT CHANGE UP
POTASSIUM BLDV-SCNC: 3.7 MMOL/L — SIGNIFICANT CHANGE UP (ref 3.4–4.5)
POTASSIUM SERPL-MCNC: 4 MMOL/L — SIGNIFICANT CHANGE UP (ref 3.5–5.3)
POTASSIUM SERPL-SCNC: 4 MMOL/L — SIGNIFICANT CHANGE UP (ref 3.5–5.3)
PROMYELOCYTES # FLD: 0 % — SIGNIFICANT CHANGE UP (ref 0–0)
PROT SERPL-MCNC: 6.6 G/DL — SIGNIFICANT CHANGE UP (ref 6–8.3)
PTH-INTACT SERPL-MCNC: 71.24 PG/ML — HIGH (ref 15–65)
RBC # BLD: 3.88 M/UL — LOW (ref 4.2–5.8)
RBC # FLD: 17.6 % — HIGH (ref 10.3–14.5)
SAO2 % BLDV: 16.4 % — LOW (ref 60–85)
SODIUM SERPL-SCNC: 143 MMOL/L — SIGNIFICANT CHANGE UP (ref 135–145)
VARIANT LYMPHS # BLD: 3.5 % — SIGNIFICANT CHANGE UP
WBC # BLD: 11.17 K/UL — HIGH (ref 3.8–10.5)
WBC # FLD AUTO: 11.17 K/UL — HIGH (ref 3.8–10.5)

## 2018-11-27 PROCEDURE — 93010 ELECTROCARDIOGRAM REPORT: CPT

## 2018-11-27 RX ORDER — ISOSORBIDE MONONITRATE 60 MG/1
120 TABLET, EXTENDED RELEASE ORAL DAILY
Qty: 0 | Refills: 0 | Status: DISCONTINUED | OUTPATIENT
Start: 2018-11-27 | End: 2018-12-02

## 2018-11-27 RX ORDER — CLOPIDOGREL BISULFATE 75 MG/1
75 TABLET, FILM COATED ORAL DAILY
Qty: 0 | Refills: 0 | Status: DISCONTINUED | OUTPATIENT
Start: 2018-11-27 | End: 2018-11-29

## 2018-11-27 RX ORDER — FUROSEMIDE 40 MG
40 TABLET ORAL
Qty: 0 | Refills: 0 | Status: DISCONTINUED | OUTPATIENT
Start: 2018-11-27 | End: 2018-11-27

## 2018-11-27 RX ORDER — ATORVASTATIN CALCIUM 80 MG/1
80 TABLET, FILM COATED ORAL AT BEDTIME
Qty: 0 | Refills: 0 | Status: DISCONTINUED | OUTPATIENT
Start: 2018-11-27 | End: 2018-12-07

## 2018-11-27 RX ORDER — FUROSEMIDE 40 MG
40 TABLET ORAL
Qty: 0 | Refills: 0 | Status: DISCONTINUED | OUTPATIENT
Start: 2018-11-27 | End: 2018-12-02

## 2018-11-27 RX ORDER — MIDAZOLAM HYDROCHLORIDE 1 MG/ML
1 INJECTION, SOLUTION INTRAMUSCULAR; INTRAVENOUS ONCE
Qty: 0 | Refills: 0 | Status: DISCONTINUED | OUTPATIENT
Start: 2018-11-27 | End: 2018-11-27

## 2018-11-27 RX ORDER — HEPARIN SODIUM 5000 [USP'U]/ML
5000 INJECTION INTRAVENOUS; SUBCUTANEOUS EVERY 12 HOURS
Qty: 0 | Refills: 0 | Status: DISCONTINUED | OUTPATIENT
Start: 2018-11-27 | End: 2018-11-28

## 2018-11-27 RX ORDER — LISINOPRIL 2.5 MG/1
20 TABLET ORAL DAILY
Qty: 0 | Refills: 0 | Status: DISCONTINUED | OUTPATIENT
Start: 2018-11-27 | End: 2018-11-27

## 2018-11-27 RX ORDER — FAMOTIDINE 10 MG/ML
20 INJECTION INTRAVENOUS DAILY
Qty: 0 | Refills: 0 | Status: DISCONTINUED | OUTPATIENT
Start: 2018-11-27 | End: 2018-12-07

## 2018-11-27 RX ORDER — ISOSORBIDE MONONITRATE 60 MG/1
120 TABLET, EXTENDED RELEASE ORAL DAILY
Qty: 0 | Refills: 0 | Status: DISCONTINUED | OUTPATIENT
Start: 2018-11-27 | End: 2018-11-27

## 2018-11-27 RX ORDER — LISINOPRIL 2.5 MG/1
20 TABLET ORAL DAILY
Qty: 0 | Refills: 0 | Status: DISCONTINUED | OUTPATIENT
Start: 2018-11-27 | End: 2018-12-02

## 2018-11-27 RX ADMIN — MIDAZOLAM HYDROCHLORIDE 1 MILLIGRAM(S): 1 INJECTION, SOLUTION INTRAMUSCULAR; INTRAVENOUS at 15:30

## 2018-11-27 NOTE — ED ADULT TRIAGE NOTE - CHIEF COMPLAINT QUOTE
pt sent from dialysis center for abd pain. pt only received 20mins dialysis out of normal 3.5 hrs. Tues, thurs, Sat.. va Left bicep fistula. abd noted to be distended.

## 2018-11-27 NOTE — ED PROVIDER NOTE - MEDICAL DECISION MAKING DETAILS
69 yo M here for abdominal pain and distension. completed only 20 min of dialysis today and was sent to ED. D/w Dr. Briceno (pts pmd) and reports that patient did not complete dialysis and needs to have while in hospital. Will work up for distension and admit to Dr. Gallardo as per Dr. Granda request.

## 2018-11-27 NOTE — ED ADULT NURSE NOTE - NSIMPLEMENTINTERV_GEN_ALL_ED
Implemented All Fall with Harm Risk Interventions:  [place] to call system. Call bell, personal items and telephone within reach. Instruct patient to call for assistance. Room bathroom lighting operational. Non-slip footwear when patient is off stretcher. Physically safe environment: no spills, clutter or unnecessary equipment. Stretcher in lowest position, wheels locked, appropriate side rails in place. Provide visual cue, wrist band, yellow gown, etc. Monitor gait and stability. Monitor for mental status changes and reorient to person, place, and time. Review medications for side effects contributing to fall risk. Reinforce activity limits and safety measures with patient and family. Provide visual clues: red socks.

## 2018-11-27 NOTE — CONSULT NOTE ADULT - PROBLEM SELECTOR RECOMMENDATION 9
ESRD on HD ( TTS) via AVF  PT goes to [facility], did not complete session today  Plan for HD today   Consent obtained from spouse   Monitor BMP and BP

## 2018-11-27 NOTE — ED ADULT NURSE REASSESSMENT NOTE - NS ED NURSE REASSESS COMMENT FT1
RN Report given to both dialysis and 7N. Plan of care for this patient is to first go to dialysis than to 7N.  Patient remains calm and awaiting transport.

## 2018-11-27 NOTE — ED ADULT NURSE NOTE - OBJECTIVE STATEMENT
PT brought into room 13. A&Ox1 male brought in by family for abdominal pain at dialysis. Family member states this has been his mental status for 2 weeks ever since being discharged from [facility] for pneumonia as well as drainage of fluid on his abdomin. According to family, pt was seen at [place] last week for similar abdominal pain where no fluid was drained . Upon assessment, patient is alert to name, and has no pain when pressing on abdomin , abdomin is hard and distended. Bowel sounds present. Denies N/V/D an is afebrile. Awaiting abdominal CT, kept NPO.. 20G iv placed in right AC labs sent.

## 2018-11-27 NOTE — ED PROVIDER NOTE - ATTENDING CONTRIBUTION TO CARE
Dr. Alexis: I performed a face to face bedside interview with patient regarding history of present illness, review of symptoms and past medical history. I completed an independent physical exam.  I have discussed patient's plan of care with PA.   I agree with note as stated above, having amended the EMR as needed to reflect my findings.   This includes HISTORY OF PRESENT ILLNESS, HIV, PAST MEDICAL/SURGICAL/FAMILY/SOCIAL HISTORY, ALLERGIES AND HOME MEDICATIONS, REVIEW OF SYSTEMS, PHYSICAL EXAM, and any PROGRESS NOTES during the time I functioned as the attending physician for this patient.    68M with pmh of ESRD (last full HD on Sat), s/p recent pneumonia sent from HD center with abd pain and distention. Pt only got partial HD today. Pt denies vomiting, melena, f/c, cp. Per wife pt had paracentesis in [facility] ( 2L out) about 1 week ago.    On exam  afebrile, BP low (at baseline as per pt's wife)  chronically ill appearing  no conj pallor  RRR s 1 s2  abd distended, nontender, +fluid wave, no pulsatile mass    likely reaccumulation of ascites, r/o pancreatitis, GB disease, ?SBP, electrolyte abnormality. Plan for labs, CT a/p, likely diagnostic paracentesis.

## 2018-11-27 NOTE — ED PROVIDER NOTE - PROGRESS NOTE DETAILS
VASU Flower: CT with ascites, will admit to Dr. Gallardo as requested by Dr. Briceno VASU Flower: CT with ascites, will admit to Dr. Gallardo for dialysis and further management including possible paracentesis as requested by Dr. Briceno, pt comfortable and sleeping in bed. VASU Flower: CT with ascites, will admit to Dr. Gallardo for dialysis and further management including possible paracentesis if pain continues as discussed with MAR. Pt admitted to [facility]us as requested by Dr. Briceno, pt comfortable and sleeping in bed.

## 2018-11-27 NOTE — ED ADULT NURSE REASSESSMENT NOTE - NS ED NURSE REASSESS COMMENT FT1
Pt returned back from CT where patient became more confused and tried to get out of bed while waiting for CT. Medications given as ordered. At this time patient is calm and awaiting admission. Dialysis consent obtained from wife. Will continue to monitor. Safety interventions maintained.

## 2018-11-27 NOTE — ED PROVIDER NOTE - NEUROLOGICAL, MLM
Pt ambulates to treatment area with his Aunt she states that for the past 2 days he has been having some abdominal pain in his mid abdomen with one episode of vomiting today and diarrhea twice with one earlier today. His sister and brother were seen at the PCP today but he could not get to see PCP. He had some cereal for breakfast and ate [employer] for dinner. Pt in no apparent distress. Permission to treat obtained from [address] [place] [address] [phone], witnessed by Bianca Stout RN and myself.
Alert and oriented, no focal deficits, no motor or sensory deficits.

## 2018-11-27 NOTE — CONSULT NOTE ADULT - ASSESSMENT
67 yo M hx of ESRD on HD ( T W TH) DM HTN presents with  abdominal pain and distension during dialysis today.

## 2018-11-27 NOTE — ED PROVIDER NOTE - OBJECTIVE STATEMENT
69 yo M hx of ESRD on HD ( T W TH) DM HTN here for abdominal pain and distension during dialysis today. pt was at dialysis, began to feels generalized abdominal discomfort and was sent to ED. Reports over the past 2 weeks he had been to [facility] and dx with PNA and tx with abx and [facility] for abdominal pain/distension and was told everything is OK and d/c home. Reports that today at dialysis the pain started again so came to ED. Able to pass flatus. Denies cp sob weakness HA dizziness fever chills vomiting diarrhea rash

## 2018-11-27 NOTE — CONSULT NOTE ADULT - SUBJECTIVE AND OBJECTIVE BOX
[facility]   MD JYOTI FAYE MD RUORU WONG, PA    TEL:  OFFICE: [phone]  DR CONNER CELL: [phone]  BARBY LEGGETT CELL: [phone]  DR. VALDEZ CELL: [phone]    -- INITIAL RENAL CONSULT NOTE  --------------------------------------------------------------------------------  HPI:  69 yo M hx of ESRD on HD ( T W TH) DM HTN presents with  abdominal pain and distension during dialysis today.   Pt goes to [facility], nephrologist is DR. Conner  Pt last HD on 11/24- did not complete session today.       PAST HISTORY  --------------------------------------------------------------------------------  PAST MEDICAL & SURGICAL HISTORY:  No pertinent past medical history  No significant past surgical history    FAMILY HISTORY:    PAST SOCIAL HISTORY:    ALLERGIES & MEDICATIONS  --------------------------------------------------------------------------------  Allergies    No Known Allergies    Intolerances      Standing Inpatient Medications    PRN Inpatient Medications      REVIEW OF SYSTEMS  --------------------------------------------------------------------------------  Gen: No fevers/chills  Skin: No rashes  Head/Eyes/Ears: Normal hearing,  Normal vision   Respiratory: No dyspnea, cough  CV: No chest pain  GI: + abdominal distention  MSK: No  edema  Heme: No easy bruising or bleeding  Psych: No significant depression    All other systems were reviewed and are negative, except as noted.    VITALS/PHYSICAL EXAM  --------------------------------------------------------------------------------  T(C): 36.2 (11-27-18 @ 12:32), Max: 36.2 (11-27-18 @ 12:32)  HR: 69 (11-27-18 @ 12:32) (67 - 69)  BP: 98/57 (11-27-18 @ 12:32) (98/57 - 101/55)  RR: 18 (11-27-18 @ 12:32) (17 - 18)  SpO2: 100% (11-27-18 @ 12:32) (99% - 100%)  Wt(kg): --        Physical Exam:  	Gen: NAD  	HEENT: MMM  	Pulm: CTA B/L  	CV: S1S2  	Abd: Soft, Distended  	Ext: No LE edema B/L  	Neuro: Awake  	Skin: Warm and dry  	Vascular access: AVF    LABS/STUDIES  --------------------------------------------------------------------------------              11.7   11.17 >-----------<  166      [11-27-18 @ 12:26]              36.6     143  |  101  |  34  ----------------------------<  98      [11-27-18 @ 12:26]  4.0   |  28  |  6.44        Ca     9.8     [11-27-18 @ 12:26]    TPro  6.6  /  Alb  3.3  /  TBili  0.7  /  DBili  x   /  AST  36  /  ALT  16  /  AlkPhos  269  [11-27-18 @ 12:26]          Creatinine Trend:  SCr 6.44 [11-27 @ 12:26]

## 2018-11-27 NOTE — H&P ADULT - HISTORY OF PRESENT ILLNESS
69 yo M hx of ESRD on HD ( T W TH) DM HTN here for abdominal pain and distension during dialysis today. pt was at dialysis, began to feels generalized abdominal discomfort and was sent to ED. Reports over the past 2 weeks he had been to [facility] and dx with PNA and tx with abx and [facility] for abdominal pain/distension and was told everything is OK and d/c home. Reports that today at dialysis the pain started again so came to ED. Able to pass flatus. Denies cp sob weakness. CT abdomen reveals moderate ascites. Labs indicate mildly elevated Lipase.

## 2018-11-28 DIAGNOSIS — N18.6 END STAGE RENAL DISEASE: ICD-10-CM

## 2018-11-28 PROBLEM — I10 ESSENTIAL (PRIMARY) HYPERTENSION: Chronic | Status: ACTIVE | Noted: 2017-08-22

## 2018-11-28 PROBLEM — E11.22 TYPE 2 DIABETES MELLITUS WITH DIABETIC CHRONIC KIDNEY DISEASE: Chronic | Status: ACTIVE | Noted: 2017-08-22

## 2018-11-28 LAB
AMMONIA BLD-MCNC: 26 UMOL/L — SIGNIFICANT CHANGE UP (ref 11–55)
BASOPHILS # BLD AUTO: 0.07 K/UL — SIGNIFICANT CHANGE UP (ref 0–0.2)
BASOPHILS NFR BLD AUTO: 0.8 % — SIGNIFICANT CHANGE UP (ref 0–2)
BUN SERPL-MCNC: 20 MG/DL — SIGNIFICANT CHANGE UP (ref 7–23)
CALCIUM SERPL-MCNC: 9 MG/DL — SIGNIFICANT CHANGE UP (ref 8.4–10.5)
CHLORIDE SERPL-SCNC: 101 MMOL/L — SIGNIFICANT CHANGE UP (ref 98–107)
CO2 SERPL-SCNC: 28 MMOL/L — SIGNIFICANT CHANGE UP (ref 22–31)
CREAT SERPL-MCNC: 4.63 MG/DL — HIGH (ref 0.5–1.3)
EOSINOPHIL # BLD AUTO: 3.48 K/UL — HIGH (ref 0–0.5)
EOSINOPHIL NFR BLD AUTO: 38.3 % — HIGH (ref 0–6)
GLUCOSE SERPL-MCNC: 75 MG/DL — SIGNIFICANT CHANGE UP (ref 70–99)
HBV CORE AB SER-ACNC: NONREACTIVE — SIGNIFICANT CHANGE UP
HBV SURFACE AB SER-ACNC: >1000 MLU/ML — SIGNIFICANT CHANGE UP
HCT VFR BLD CALC: 30.2 % — LOW (ref 39–50)
HCV AB S/CO SERPL IA: 0.13 S/CO — SIGNIFICANT CHANGE UP
HCV AB SERPL-IMP: SIGNIFICANT CHANGE UP
HGB BLD-MCNC: 9.4 G/DL — LOW (ref 13–17)
IMM GRANULOCYTES # BLD AUTO: 0.02 # — SIGNIFICANT CHANGE UP
IMM GRANULOCYTES NFR BLD AUTO: 0.2 % — SIGNIFICANT CHANGE UP (ref 0–1.5)
LYMPHOCYTES # BLD AUTO: 1.64 K/UL — SIGNIFICANT CHANGE UP (ref 1–3.3)
LYMPHOCYTES # BLD AUTO: 18 % — SIGNIFICANT CHANGE UP (ref 13–44)
MAGNESIUM SERPL-MCNC: 2.2 MG/DL — SIGNIFICANT CHANGE UP (ref 1.6–2.6)
MCHC RBC-ENTMCNC: 29.4 PG — SIGNIFICANT CHANGE UP (ref 27–34)
MCHC RBC-ENTMCNC: 31.1 % — LOW (ref 32–36)
MCV RBC AUTO: 94.4 FL — SIGNIFICANT CHANGE UP (ref 80–100)
MONOCYTES # BLD AUTO: 0.6 K/UL — SIGNIFICANT CHANGE UP (ref 0–0.9)
MONOCYTES NFR BLD AUTO: 6.6 % — SIGNIFICANT CHANGE UP (ref 2–14)
NEUTROPHILS # BLD AUTO: 3.28 K/UL — SIGNIFICANT CHANGE UP (ref 1.8–7.4)
NEUTROPHILS NFR BLD AUTO: 36.1 % — LOW (ref 43–77)
NRBC # FLD: 0 — SIGNIFICANT CHANGE UP
NT-PROBNP SERPL-SCNC: SIGNIFICANT CHANGE UP PG/ML
PHOSPHATE SERPL-MCNC: 2.5 MG/DL — SIGNIFICANT CHANGE UP (ref 2.5–4.5)
PLATELET # BLD AUTO: 133 K/UL — LOW (ref 150–400)
PMV BLD: 12.1 FL — SIGNIFICANT CHANGE UP (ref 7–13)
POTASSIUM SERPL-MCNC: 3.5 MMOL/L — SIGNIFICANT CHANGE UP (ref 3.5–5.3)
POTASSIUM SERPL-SCNC: 3.5 MMOL/L — SIGNIFICANT CHANGE UP (ref 3.5–5.3)
RBC # BLD: 3.2 M/UL — LOW (ref 4.2–5.8)
RBC # FLD: 17.2 % — HIGH (ref 10.3–14.5)
SODIUM SERPL-SCNC: 142 MMOL/L — SIGNIFICANT CHANGE UP (ref 135–145)
WBC # BLD: 9.09 K/UL — SIGNIFICANT CHANGE UP (ref 3.8–10.5)
WBC # FLD AUTO: 9.09 K/UL — SIGNIFICANT CHANGE UP (ref 3.8–10.5)

## 2018-11-28 PROCEDURE — 76705 ECHO EXAM OF ABDOMEN: CPT | Mod: 26

## 2018-11-28 PROCEDURE — 93306 TTE W/DOPPLER COMPLETE: CPT | Mod: 26

## 2018-11-28 RX ORDER — FAMOTIDINE 10 MG/ML
1 INJECTION INTRAVENOUS
Qty: 0 | Refills: 0 | COMMUNITY

## 2018-11-28 RX ORDER — CHLORHEXIDINE GLUCONATE 213 G/1000ML
1 SOLUTION TOPICAL ONCE
Qty: 0 | Refills: 0 | Status: DISCONTINUED | OUTPATIENT
Start: 2018-11-28 | End: 2018-11-28

## 2018-11-28 RX ORDER — ERYTHROPOIETIN 10000 [IU]/ML
10000 INJECTION, SOLUTION INTRAVENOUS; SUBCUTANEOUS
Qty: 0 | Refills: 0 | Status: DISCONTINUED | OUTPATIENT
Start: 2018-11-28 | End: 2018-12-07

## 2018-11-28 RX ORDER — CHLORHEXIDINE GLUCONATE 213 G/1000ML
1 SOLUTION TOPICAL
Qty: 0 | Refills: 0 | Status: DISCONTINUED | OUTPATIENT
Start: 2018-11-28 | End: 2018-12-07

## 2018-11-28 RX ORDER — MIDODRINE HYDROCHLORIDE 2.5 MG/1
5 TABLET ORAL
Qty: 0 | Refills: 0 | Status: DISCONTINUED | OUTPATIENT
Start: 2018-11-28 | End: 2018-12-07

## 2018-11-28 RX ADMIN — HEPARIN SODIUM 5000 UNIT(S): 5000 INJECTION INTRAVENOUS; SUBCUTANEOUS at 17:34

## 2018-11-28 RX ADMIN — CHLORHEXIDINE GLUCONATE 1 APPLICATION(S): 213 SOLUTION TOPICAL at 06:43

## 2018-11-28 RX ADMIN — Medication 40 MILLIGRAM(S): at 23:31

## 2018-11-28 RX ADMIN — HEPARIN SODIUM 5000 UNIT(S): 5000 INJECTION INTRAVENOUS; SUBCUTANEOUS at 06:43

## 2018-11-28 RX ADMIN — CHLORHEXIDINE GLUCONATE 1 APPLICATION(S): 213 SOLUTION TOPICAL at 17:34

## 2018-11-28 RX ADMIN — ATORVASTATIN CALCIUM 80 MILLIGRAM(S): 80 TABLET, FILM COATED ORAL at 23:31

## 2018-11-28 NOTE — CONSULT NOTE ADULT - PROBLEM SELECTOR RECOMMENDATION 9
- CT Abd noted with large volume ascites  - unclear etiology; ?setting of volume overload from CKD; r/o CHF   - pBNP >41047  - Check TTE   - US Abd pending   - hepatitis panel pending   - recommend Diagnostic/Therapeutic paracentesis with procedure team

## 2018-11-28 NOTE — CONSULT NOTE ADULT - SUBJECTIVE AND OBJECTIVE BOX
Joaquim Desouza MD  Interventional Cardiology / Advance Heart Failure and Cardiac Transplant Specialist  [place] Office : [address] [place] [address] [place] [address]  Tel:   [place] Office : [address] [facility] [place] [address]  Tel: [phone]  Cell : [phone]    HISTORY OF PRESENT ILLNESS:  69 yo M hx of ESRD on HD ( T W TH) DM HTN here for abdominal pain and distension during dialysis today. pt was at dialysis, began to feels generalized abdominal discomfort and was sent to ED. Reports over the past 2 weeks he had been to [facility] and dx with PNA and tx with abx and [facility] for abdominal pain/distension and was told everything is OK and d/c home. Reports that today at dialysis the pain started again so came to ED. Able to pass flatus. Denies cp sob weakness. CT abdomen reveals moderate ascites. Labs indicate mildly elevated Lipase.  Pt is confused good history not available    PAST MEDICAL & SURGICAL HISTORY:  ESRD (end stage renal disease) on dialysis  No significant past surgical history    	    MEDICATIONS:  clopidogrel Tablet 75 milliGRAM(s) Oral daily  furosemide    Tablet 40 milliGRAM(s) Oral four times a day  heparin  Injectable 5000 Unit(s) SubCutaneous every 12 hours  isosorbide   mononitrate ER Tablet (IMDUR) 120 milliGRAM(s) Oral daily  lisinopril 20 milliGRAM(s) Oral daily          famotidine    Tablet 20 milliGRAM(s) Oral daily    atorvastatin 80 milliGRAM(s) Oral at bedtime    chlorhexidine 4% Liquid 1 Application(s) Topical two times a day      FAMILY HISTORY:        Allergies    No Known Allergies    Intolerances    	      PHYSICAL EXAM:  T(C): 36.8 (11-28-18 @ 04:56), Max: 37.1 (11-27-18 @ 18:47)  HR: 85 (11-28-18 @ 04:56) (67 - 85)  BP: 102/65 (11-28-18 @ 04:56) (94/49 - 118/68)  RR: 18 (11-28-18 @ 04:56) (18 - 18)  SpO2: 95% (11-28-18 @ 04:56) (95% - 100%)  Wt(kg): --  I&O's Summary    27 Nov 2018 07:01  -  28 Nov 2018 07:00  --------------------------------------------------------  IN: 400 mL / OUT: 1100 mL / NET: -700 mL        Appearance: Normal	  HEENT:   Normal oral mucosa, PERRL, EOMI	  Cardiovascular: Normal S1 S2, No JVD, No murmurs, No edema  Respiratory: Lungs clear to auscultation	  Gastrointestinal:  + large ascites  Extremities: Normal range of motion, No clubbing, cyanosis or edema    LABS:	 	    CARDIAC MARKERS:                                  9.4    9.09  )-----------( 133      ( 28 Nov 2018 04:25 )             30.2     11-28    142  |  101  |  20  ----------------------------<  75  3.5   |  28  |  4.63<H>    Ca    9.0      28 Nov 2018 04:25  Phos  2.5     11-28  Mg     2.2     11-28    TPro  6.6  /  Alb  3.3  /  TBili  0.7  /  DBili  x   /  AST  36  /  ALT  16  /  AlkPhos  269<H>  11-27    proBNP: Serum Pro-Brain Natriuretic Peptide: 43743 pg/mL (11-28 @ 10:05)    Lipid Profile:   HgA1c:   TSH:     ASSESSMENT/PLAN:

## 2018-11-28 NOTE — CONSULT NOTE ADULT - ASSESSMENT
EKG - NSR   Echo - pending     a/p     1) Large volume ascites - ? cause, Liver ok on CT scan, albumin level ok , ammonia level 26, will get 2d echo to r/o RV dysfunction     2) ESRD on HD - nephrology on case     3) Confusion - ? cause, will speak to family further

## 2018-11-28 NOTE — CONSULT NOTE ADULT - PROBLEM SELECTOR RECOMMENDATION 3
- increase fluid removal with HD if possible   - continue recommendations per nephrology; appreciated

## 2018-11-28 NOTE — PROGRESS NOTE ADULT - PROBLEM SELECTOR PLAN 1
ESRD on HD ( TTS) via AVF  Last HD on 11/27 with only 700cc fluid removal (sec to intradialytic hypotension)  Plan for HD tomorrow will try midodrine prior to HD if ok with cardiology   Monitor BMP and BP  Renal diet

## 2018-11-28 NOTE — PROGRESS NOTE ADULT - PROBLEM SELECTOR PLAN 1
Ascites noted, reason could be volume or cirrhosis. Will need a paracentesis. GI eval noted. Obtain Echo as well as per cardiology.

## 2018-11-28 NOTE — CONSULT NOTE ADULT - SUBJECTIVE AND OBJECTIVE BOX
Chief Complaint:  Patient is a 68y old  Male who presents with a chief complaint of Abdominal distension (2018 19:20)    ESRD (end stage renal disease) on dialysis  No pertinent past medical history  No significant past surgical history     HPI: *pt with AMS, unable to obtain; collateral information obtained via chart at this time   67 yo M hx of ESRD on HD (T W TH) DM HTN here for abdominal pain and distension during dialysis today. pt was at dialysis, began to feels generalized abdominal discomfort and was sent to ED. Reports over the past 2 weeks he had been to [facility] and dx with PNA and tx with [facility] and [facility] for abdominal pain/distension and was told everything is OK and d/c home. Reports that today at dialysis the pain started again so came to ED. Able to pass flatus. Denies cp sob weakness. CT abdomen reveals large volume ascites. Labs indicate mildly elevated Lipase.      No Known Allergies      atorvastatin 80 milliGRAM(s) Oral at bedtime  chlorhexidine 4% Liquid 1 Application(s) Topical two times a day  clopidogrel Tablet 75 milliGRAM(s) Oral daily  famotidine    Tablet 20 milliGRAM(s) Oral daily  furosemide    Tablet 40 milliGRAM(s) Oral four times a day  heparin  Injectable 5000 Unit(s) SubCutaneous every 12 hours  isosorbide   mononitrate ER Tablet (IMDUR) 120 milliGRAM(s) Oral daily  lisinopril 20 milliGRAM(s) Oral daily        FAMILY HISTORY:        Review of Systems: *pt confused, unable to obtain        Relevant Family History:   n/c    Relevant Social History: n/c      Physical Exam:    Vital Signs:  Vital Signs Last 24 Hrs  T(C): 36.8 (2018 04:56), Max: 37.1 (2018 18:47)  T(F): 98.3 (2018 04:56), Max: 98.7 (2018 18:47)  HR: 85 (2018 04:56) (67 - 85)  BP: 102/65 (2018 04:56) (94/49 - 118/68)  BP(mean): --  RR: 18 (2018 04:56) (17 - 18)  SpO2: 95% (2018 04:56) (95% - 100%)  Daily Height in cm: 170.18 (2018 18:47)    Daily Weight in k.2 (2018 00:00)    General:  Appears stated age, well-groomed, nad  HEENT:  NC/AT,  conjunctivae clear and pink, no thyromegaly, nodules, adenopathy, no JVD  Chest:  Full & symmetric excursion, no increased effort, breath sounds clear  Cardiovascular:  Regular rhythm, S1, S2, no murmur/rub/S3/S4, no abdominal bruit, no edema  Abdomen:  Soft, non-tender, (+)distended, normoactive bowel sounds,  no masses ,no hepatosplenomeagaly, no signs of chronic liver disease  Extremities:  no cyanosis,clubbing or edema  Skin:  No rash/erythema/ecchymoses/petechiae/wounds/abscess/warm/dry  Neuro/Psych:  A&Ox0  , no asterixis, no tremor, no encephalopathy    Laboratory:                            9.4    9.09  )-----------( 133      ( 2018 04:25 )             30.2     -    142  |  101  |  20  ----------------------------<  75  3.5   |  28  |  4.63<H>    Ca    9.0      2018 04:25  Phos  2.5       Mg     2.2         TPro  6.6  /  Alb  3.3  /  TBili  0.7  /  DBili  x   /  AST  36  /  ALT  16  /  AlkPhos  269<H>      LIVER FUNCTIONS - ( 2018 12:26 )  Alb: 3.3 g/dL / Pro: 6.6 g/dL / ALK PHOS: 269 u/L / ALT: 16 u/L / AST: 36 u/L / GGT: x               Amylase Serum--      Lipase serum--       [id number]  Amylase Serum--      Lipase serum60.6       Ammonia--    Imaging:      < from: CT Abdomen and Pelvis w/ IV Cont (18 @ 15:33) >    EXAM:  CT ABDOMEN AND PELVIS IC        PROCEDURE DATE:  2018         INTERPRETATION:  CLINICAL INFORMATION: Abdominal pain and distention.   ESRD on hemodialysis.    COMPARISON: None.    PROCEDURE:   CT of the Abdomen and Pelvis was performed with intravenous contrast.   Intravenous contrast: 90 ml Omnipaque 350. 10 ml discarded.  Oral contrast: None.  Sagittal and coronal reformats were performed.    FINDINGS:    LOWER CHEST: Small right pleural effusion with adjacent compressive   atelectasis. Coronary artery calcifications. Trace pericardial effusion.    LIVER: Within normal limits. Patent portal veins.  BILE DUCTS: Normal caliber.  GALLBLADDER: Within normal limits.  SPLEEN: Within normal limits.  PANCREAS: Within normal limits.  ADRENALS: Within normal limits.  KIDNEYS/URETERS: Atrophic bilateral kidneys.    BLADDER: Collapsed.  REPRODUCTIVE ORGANS: The prostate is enlarged.    BOWEL: No bowel obstruction. Appendix is not visualized.  PERITONEUM: Large volume of ascites.  VESSELS:  Atherosclerotic calcifications.  RETROPERITONEUM: No lymphadenopathy.    ABDOMINAL WALL: Within normal limits.  BONES: Degenerative changes of the spine.    IMPRESSION:     Large volume of ascites.   Small right pleural effusion with adjacent compressive atelectasis. Trace   pericardial effusion.                  DAISY ALVAREZ M.D., RADIOLOGY RESIDENT  This document has been electronically signed.  BUZZ TRAORE M.D., ATTENDING RADIOLOGIST  This document has been electronically signed. 2018  4:18PM                  < end of copied text >

## 2018-11-29 DIAGNOSIS — Q24.8 OTHER SPECIFIED CONGENITAL MALFORMATIONS OF HEART: ICD-10-CM

## 2018-11-29 LAB
ALBUMIN FLD-MCNC: 1.9 G/DL — SIGNIFICANT CHANGE UP
APTT BLD: 21.2 SEC — LOW (ref 27.5–36.3)
BASOPHILS # BLD AUTO: 0.08 K/UL — SIGNIFICANT CHANGE UP (ref 0–0.2)
BASOPHILS NFR BLD AUTO: 0.8 % — SIGNIFICANT CHANGE UP (ref 0–2)
BODY FLUID TYPE: SIGNIFICANT CHANGE UP
BUN SERPL-MCNC: 27 MG/DL — HIGH (ref 7–23)
CALCIUM SERPL-MCNC: 9.4 MG/DL — SIGNIFICANT CHANGE UP (ref 8.4–10.5)
CHLORIDE SERPL-SCNC: 98 MMOL/L — SIGNIFICANT CHANGE UP (ref 98–107)
CLARITY SPEC: CLEAR — SIGNIFICANT CHANGE UP
CO2 SERPL-SCNC: 21 MMOL/L — LOW (ref 22–31)
COLOR FLD: YELLOW — SIGNIFICANT CHANGE UP
CREAT SERPL-MCNC: 5.93 MG/DL — HIGH (ref 0.5–1.3)
EOSINOPHIL # BLD AUTO: 3.94 K/UL — HIGH (ref 0–0.5)
EOSINOPHIL # FLD: 18 % — SIGNIFICANT CHANGE UP
EOSINOPHIL NFR BLD AUTO: 37.2 % — HIGH (ref 0–6)
FERRITIN SERPL-MCNC: 1826 NG/ML — HIGH (ref 30–400)
FOLATE SERPL-MCNC: 12.7 NG/ML — SIGNIFICANT CHANGE UP (ref 4.7–20)
GLUCOSE FLD-MCNC: 64 MG/DL — SIGNIFICANT CHANGE UP
GLUCOSE SERPL-MCNC: 51 MG/DL — LOW (ref 70–99)
GRAM STN FLD: SIGNIFICANT CHANGE UP
HAPTOGLOB SERPL-MCNC: 105 MG/DL — SIGNIFICANT CHANGE UP (ref 34–200)
HAV IGM SER-ACNC: NONREACTIVE — SIGNIFICANT CHANGE UP
HBV CORE IGM SER-ACNC: NONREACTIVE — SIGNIFICANT CHANGE UP
HBV SURFACE AG SER-ACNC: NONREACTIVE — SIGNIFICANT CHANGE UP
HCT VFR BLD CALC: 32.9 % — LOW (ref 39–50)
HCV AB S/CO SERPL IA: 0.13 S/CO — SIGNIFICANT CHANGE UP
HCV AB SERPL-IMP: SIGNIFICANT CHANGE UP
HGB BLD-MCNC: 10.1 G/DL — LOW (ref 13–17)
IMM GRANULOCYTES # BLD AUTO: 0.04 # — SIGNIFICANT CHANGE UP
IMM GRANULOCYTES NFR BLD AUTO: 0.4 % — SIGNIFICANT CHANGE UP (ref 0–1.5)
INR BLD: 1.02 — SIGNIFICANT CHANGE UP (ref 0.88–1.17)
IRON SATN MFR SERPL: 177 UG/DL — SIGNIFICANT CHANGE UP (ref 155–535)
IRON SATN MFR SERPL: 67 UG/DL — SIGNIFICANT CHANGE UP (ref 45–165)
LDH SERPL L TO P-CCNC: 101 U/L — SIGNIFICANT CHANGE UP
LDH SERPL L TO P-CCNC: 264 U/L — HIGH (ref 135–225)
LYMPHOCYTES # BLD AUTO: 1.8 K/UL — SIGNIFICANT CHANGE UP (ref 1–3.3)
LYMPHOCYTES # BLD AUTO: 17 % — SIGNIFICANT CHANGE UP (ref 13–44)
LYMPHOCYTES NFR FLD: 35 % — SIGNIFICANT CHANGE UP
MACROPHAGES # FLD: 39 % — SIGNIFICANT CHANGE UP
MAGNESIUM SERPL-MCNC: 2.4 MG/DL — SIGNIFICANT CHANGE UP (ref 1.6–2.6)
MCHC RBC-ENTMCNC: 29.5 PG — SIGNIFICANT CHANGE UP (ref 27–34)
MCHC RBC-ENTMCNC: 30.7 % — LOW (ref 32–36)
MCV RBC AUTO: 96.2 FL — SIGNIFICANT CHANGE UP (ref 80–100)
MONOCYTES # BLD AUTO: 0.64 K/UL — SIGNIFICANT CHANGE UP (ref 0–0.9)
MONOCYTES # FLD: 8 % — SIGNIFICANT CHANGE UP
MONOCYTES NFR BLD AUTO: 6 % — SIGNIFICANT CHANGE UP (ref 2–14)
NEUTROPHILS # BLD AUTO: 4.08 K/UL — SIGNIFICANT CHANGE UP (ref 1.8–7.4)
NEUTROPHILS NFR BLD AUTO: 38.6 % — LOW (ref 43–77)
NRBC # FLD: 0 — SIGNIFICANT CHANGE UP
PHOSPHATE SERPL-MCNC: 3.8 MG/DL — SIGNIFICANT CHANGE UP (ref 2.5–4.5)
PLATELET # BLD AUTO: 134 K/UL — LOW (ref 150–400)
PMV BLD: 11.6 FL — SIGNIFICANT CHANGE UP (ref 7–13)
POTASSIUM SERPL-MCNC: 4.3 MMOL/L — SIGNIFICANT CHANGE UP (ref 3.5–5.3)
POTASSIUM SERPL-SCNC: 4.3 MMOL/L — SIGNIFICANT CHANGE UP (ref 3.5–5.3)
PROT FLD-MCNC: 3.8 G/DL — SIGNIFICANT CHANGE UP
PROTHROM AB SERPL-ACNC: 11.6 SEC — SIGNIFICANT CHANGE UP (ref 9.8–13.1)
RBC # BLD: 3.42 M/UL — LOW (ref 4.2–5.8)
RBC # FLD: 17.4 % — HIGH (ref 10.3–14.5)
RCV VOL RI: 35 CELL/UL — HIGH (ref 0–5)
RETICS #: 53 K/UL — SIGNIFICANT CHANGE UP (ref 25–125)
RETICS/RBC NFR: 1.6 % — SIGNIFICANT CHANGE UP (ref 0.5–2.5)
SODIUM SERPL-SCNC: 140 MMOL/L — SIGNIFICANT CHANGE UP (ref 135–145)
SPECIMEN SOURCE: SIGNIFICANT CHANGE UP
SPECIMEN SOURCE: SIGNIFICANT CHANGE UP
TOTAL CELLS COUNTED, BODY FLUID: 100 CELLS — SIGNIFICANT CHANGE UP
TOTAL NUCLEATED CELL COUNT, BODY FLUID: 98 CELL/UL — HIGH (ref 0–5)
UIBC SERPL-MCNC: 109.7 UG/DL — LOW (ref 110–370)
VIT B12 SERPL-MCNC: > 2000 PG/ML — HIGH (ref 200–900)
WBC # BLD: 10.58 K/UL — HIGH (ref 3.8–10.5)
WBC # FLD AUTO: 10.58 K/UL — HIGH (ref 3.8–10.5)

## 2018-11-29 PROCEDURE — 49083 ABD PARACENTESIS W/IMAGING: CPT | Mod: GC

## 2018-11-29 PROCEDURE — 70450 CT HEAD/BRAIN W/O DYE: CPT | Mod: 26

## 2018-11-29 PROCEDURE — 88112 CYTOPATH CELL ENHANCE TECH: CPT | Mod: 26

## 2018-11-29 PROCEDURE — 88305 TISSUE EXAM BY PATHOLOGIST: CPT | Mod: 26

## 2018-11-29 RX ORDER — HEPARIN SODIUM 5000 [USP'U]/ML
5000 INJECTION INTRAVENOUS; SUBCUTANEOUS EVERY 12 HOURS
Qty: 0 | Refills: 0 | Status: DISCONTINUED | OUTPATIENT
Start: 2018-11-29 | End: 2018-11-30

## 2018-11-29 RX ORDER — DEXTROSE 50 % IN WATER 50 %
25 SYRINGE (ML) INTRAVENOUS ONCE
Qty: 0 | Refills: 0 | Status: COMPLETED | OUTPATIENT
Start: 2018-11-29 | End: 2018-11-29

## 2018-11-29 RX ORDER — DEXTROSE 50 % IN WATER 50 %
25 SYRINGE (ML) INTRAVENOUS ONCE
Qty: 0 | Refills: 0 | Status: DISCONTINUED | OUTPATIENT
Start: 2018-11-29 | End: 2018-11-29

## 2018-11-29 RX ADMIN — ATORVASTATIN CALCIUM 80 MILLIGRAM(S): 80 TABLET, FILM COATED ORAL at 23:56

## 2018-11-29 RX ADMIN — ERYTHROPOIETIN 10000 UNIT(S): 10000 INJECTION, SOLUTION INTRAVENOUS; SUBCUTANEOUS at 12:21

## 2018-11-29 RX ADMIN — Medication 25 MILLILITER(S): at 09:27

## 2018-11-29 RX ADMIN — FAMOTIDINE 20 MILLIGRAM(S): 10 INJECTION INTRAVENOUS at 18:13

## 2018-11-29 RX ADMIN — MIDODRINE HYDROCHLORIDE 5 MILLIGRAM(S): 2.5 TABLET ORAL at 09:56

## 2018-11-29 RX ADMIN — CHLORHEXIDINE GLUCONATE 1 APPLICATION(S): 213 SOLUTION TOPICAL at 18:13

## 2018-11-29 RX ADMIN — Medication 40 MILLIGRAM(S): at 06:16

## 2018-11-29 RX ADMIN — CHLORHEXIDINE GLUCONATE 1 APPLICATION(S): 213 SOLUTION TOPICAL at 06:16

## 2018-11-29 RX ADMIN — Medication 40 MILLIGRAM(S): at 18:12

## 2018-11-29 RX ADMIN — HEPARIN SODIUM 5000 UNIT(S): 5000 INJECTION INTRAVENOUS; SUBCUTANEOUS at 18:13

## 2018-11-29 RX ADMIN — LISINOPRIL 20 MILLIGRAM(S): 2.5 TABLET ORAL at 06:16

## 2018-11-29 NOTE — PROVIDER CONTACT NOTE (CRITICAL VALUE NOTIFICATION) - ASSESSMENT
BS 68, asymptomatic of hypoglycemia at this time, pt remains confused, uncooperative with establishing IV access at this time.

## 2018-11-29 NOTE — PROGRESS NOTE ADULT - PROBLEM SELECTOR PLAN 1
- CT Abd noted with large volume ascites  - suspect in setting of volume overload from CKD and CHF; pBNP >16534  - TTE notable for EF 80-85% and LVOT obstruction   - s/p diagnostic tap 11/29, results pending  - recommend Therapeutic paracentesis with procedure team given large volume ascites

## 2018-11-29 NOTE — PROGRESS NOTE ADULT - PROBLEM SELECTOR PLAN 1
ESRD on HD ( TTS) via AVF  Last HD on 11/27 with only 700cc fluid removal (sec to intradialytic hypotension)  Pt seen on dialysis, tolerating well , U Fas tolerated   Monitor BMP and BP  Renal diet

## 2018-11-29 NOTE — PROCEDURE NOTE - ADDITIONAL PROCEDURE DETAILS
Primary team notified of procedure completion. 180cc of ascites obtained and walked to lab by procedure team members. No complications.

## 2018-11-29 NOTE — PROGRESS NOTE ADULT - PROBLEM SELECTOR PLAN 1
Ascites noted, reason could be volume or cirrhosis. Paracentesis scheduled for today.  GI and cardiology follow-up noted.  Echo only shows mild LVOT obstruction, this should not explain all his symptoms.

## 2018-11-30 DIAGNOSIS — R41.0 DISORIENTATION, UNSPECIFIED: ICD-10-CM

## 2018-11-30 DIAGNOSIS — R41.82 ALTERED MENTAL STATUS, UNSPECIFIED: ICD-10-CM

## 2018-11-30 LAB
ALBUMIN SERPL ELPH-MCNC: 3 G/DL — LOW (ref 3.3–5)
ALP SERPL-CCNC: 235 U/L — HIGH (ref 40–120)
ALT FLD-CCNC: 13 U/L — SIGNIFICANT CHANGE UP (ref 4–41)
AST SERPL-CCNC: 30 U/L — SIGNIFICANT CHANGE UP (ref 4–40)
BILIRUB SERPL-MCNC: 0.7 MG/DL — SIGNIFICANT CHANGE UP (ref 0.2–1.2)
BUN SERPL-MCNC: 19 MG/DL — SIGNIFICANT CHANGE UP (ref 7–23)
CALCIUM SERPL-MCNC: 9.3 MG/DL — SIGNIFICANT CHANGE UP (ref 8.4–10.5)
CHLORIDE SERPL-SCNC: 97 MMOL/L — LOW (ref 98–107)
CO2 SERPL-SCNC: 25 MMOL/L — SIGNIFICANT CHANGE UP (ref 22–31)
CREAT SERPL-MCNC: 4.89 MG/DL — HIGH (ref 0.5–1.3)
CULTURE - ACID FAST SMEAR CONCENTRATED: SIGNIFICANT CHANGE UP
GLUCOSE SERPL-MCNC: 84 MG/DL — SIGNIFICANT CHANGE UP (ref 70–99)
HCT VFR BLD CALC: 33.2 % — LOW (ref 39–50)
HGB BLD-MCNC: 10.5 G/DL — LOW (ref 13–17)
MAGNESIUM SERPL-MCNC: 2.3 MG/DL — SIGNIFICANT CHANGE UP (ref 1.6–2.6)
MCHC RBC-ENTMCNC: 29.4 PG — SIGNIFICANT CHANGE UP (ref 27–34)
MCHC RBC-ENTMCNC: 31.6 % — LOW (ref 32–36)
MCV RBC AUTO: 93 FL — SIGNIFICANT CHANGE UP (ref 80–100)
NRBC # FLD: 0.02 — SIGNIFICANT CHANGE UP
PHOSPHATE SERPL-MCNC: 3.1 MG/DL — SIGNIFICANT CHANGE UP (ref 2.5–4.5)
PLATELET # BLD AUTO: 143 K/UL — LOW (ref 150–400)
PMV BLD: 11.6 FL — SIGNIFICANT CHANGE UP (ref 7–13)
POTASSIUM SERPL-MCNC: 3.7 MMOL/L — SIGNIFICANT CHANGE UP (ref 3.5–5.3)
POTASSIUM SERPL-SCNC: 3.7 MMOL/L — SIGNIFICANT CHANGE UP (ref 3.5–5.3)
PROT SERPL-MCNC: 6.6 G/DL — SIGNIFICANT CHANGE UP (ref 6–8.3)
RBC # BLD: 3.57 M/UL — LOW (ref 4.2–5.8)
RBC # FLD: 17.3 % — HIGH (ref 10.3–14.5)
SODIUM SERPL-SCNC: 138 MMOL/L — SIGNIFICANT CHANGE UP (ref 135–145)
SPECIMEN SOURCE: SIGNIFICANT CHANGE UP
SPECIMEN SOURCE: SIGNIFICANT CHANGE UP
WBC # BLD: 9.1 K/UL — SIGNIFICANT CHANGE UP (ref 3.8–10.5)
WBC # FLD AUTO: 9.1 K/UL — SIGNIFICANT CHANGE UP (ref 3.8–10.5)

## 2018-11-30 PROCEDURE — 70498 CT ANGIOGRAPHY NECK: CPT | Mod: 26

## 2018-11-30 PROCEDURE — 70496 CT ANGIOGRAPHY HEAD: CPT | Mod: 26

## 2018-11-30 RX ADMIN — Medication 40 MILLIGRAM(S): at 17:42

## 2018-11-30 RX ADMIN — CHLORHEXIDINE GLUCONATE 1 APPLICATION(S): 213 SOLUTION TOPICAL at 17:42

## 2018-11-30 RX ADMIN — CHLORHEXIDINE GLUCONATE 1 APPLICATION(S): 213 SOLUTION TOPICAL at 05:19

## 2018-11-30 RX ADMIN — ISOSORBIDE MONONITRATE 120 MILLIGRAM(S): 60 TABLET, EXTENDED RELEASE ORAL at 12:42

## 2018-11-30 RX ADMIN — FAMOTIDINE 20 MILLIGRAM(S): 10 INJECTION INTRAVENOUS at 12:42

## 2018-11-30 RX ADMIN — Medication 40 MILLIGRAM(S): at 05:19

## 2018-11-30 RX ADMIN — Medication 40 MILLIGRAM(S): at 12:42

## 2018-11-30 RX ADMIN — LISINOPRIL 20 MILLIGRAM(S): 2.5 TABLET ORAL at 05:19

## 2018-11-30 RX ADMIN — ATORVASTATIN CALCIUM 80 MILLIGRAM(S): 80 TABLET, FILM COATED ORAL at 22:34

## 2018-11-30 RX ADMIN — HEPARIN SODIUM 5000 UNIT(S): 5000 INJECTION INTRAVENOUS; SUBCUTANEOUS at 05:19

## 2018-11-30 NOTE — PROGRESS NOTE ADULT - PROBLEM SELECTOR PLAN 1
ESRD on HD ( TTS) via AVF  Last HD on 11/29 with 1.1 L UF , given midodrine   Plan for HD tomorrow ( pt planned to receive CT HEad with contrast today , HD unit aware to dialyze pt 1st shift tomorrow)  Monitor BMP and BP  Renal diet

## 2018-11-30 NOTE — SWALLOW BEDSIDE ASSESSMENT ADULT - ORAL PREPARATORY PHASE
Refuses to accept bolus into oral cavity Within functional limits/mildly increased mastication for solid secondary to edentulous status large sips taken of thin liquids, unable to follow directions to take smaller sips Within functional limits

## 2018-11-30 NOTE — SWALLOW BEDSIDE ASSESSMENT ADULT - SWALLOW EVAL: RECOMMENDED FEEDING/EATING TECHNIQUES
position upright (90 degrees)/small sips/bites/maintain upright posture during/after eating for 30 mins/crush medication (when feasible)

## 2018-11-30 NOTE — DISCHARGE NOTE ADULT - PLAN OF CARE
EEG: continue HD TTS via AVF MRI brain - parenchymal and SAH in left temporal region   holding antiplatelets   follow up outpatient repeat MRI brain in 6-8 weeks CT scan showed lung nodule measuring up to 3mm. One year follow up of CT recommended  May have repeat MRI brain in 6 to 8 weeks to assess for underlying mass. s/p diagnostic tap 2/2 cardiac ascites   ?constrictive pericarditis   follow up outpatient cardiology   return to ED sooner if symptoms worsen such as difficulty breathing, confusion, EEG:  Continue Keppra 500mg daily   Neuro followup Too small to tap, not a candidate for VATS  Stable right loculated effusion  Pulmonology follow up continue HD   renal diet   Follow up outpatient  Recommend Nephro-david 1cap daily  continue epotein shakira injectable for anemia due to chronic kidney disease   continue gi ppx with famotidine 20mg Monitor blood pressure closely   blood pressure meds were held due to orthostatics  BP's have maintained <140 for hospital stay   Follow up outpatient MRI brain - parenchymal and SAH in left temporal region   holding antiplatelets for another 7-10 days  follow up physical therapy  Use assistive device while ambulating   follow up outpatient repeat MRI brain in 6-8 weeks  Neuro follow up s/p diagnostic tap 2/2 cardiac ascites   ?constrictive pericarditis   follow up outpatient cardiology   return to ED sooner if symptoms worsen such as fever, difficulty breathing, and confusion abnormal EEG  Continue Keppra 500mg daily   Neuro followup CT scan showed lung nodule measuring up to 3mm  One year follow up of CT recommended  May have repeat MRI brain in 6 to 8 weeks to assess for underlying mass. s/p diagnostic tap 2/2 cardiac ascites  follow up outpatient cardiology   return to ED sooner if symptoms worsen such as fever, difficulty breathing, and confusion MRI brain - parenchymal and SAH in left temporal region   holding antiplatelets for another 7-10 days  follow up physical therapy  Use assistive device while ambulating   follow up outpatient for repeat MRI brain in 6-8 weeks  Neuro follow up CT scan showed lung nodule measuring up to 3mm  One year follow up of CT recommended Resolution of abdominal distension

## 2018-11-30 NOTE — DISCHARGE NOTE ADULT - PATIENT PORTAL LINK FT
You can access the [facility] Patient Portal, offered by [facility], by registering with the following website: [email or web address]

## 2018-11-30 NOTE — PROGRESS NOTE ADULT - PROBLEM SELECTOR PLAN 1
Ascites noted, reason could be volume or cirrhosis. Paracentesis scheduled for today.  GI and cardiology follow-up noted.

## 2018-11-30 NOTE — PROGRESS NOTE ADULT - PROBLEM SELECTOR PLAN 2
- CT Head noting subtle mass effect in the left temporal lobe may represent underlying mass or resolving subacute hematoma  - consider MRI - CT Head noting subtle mass effect in the left temporal lobe may represent underlying mass or resolving subacute hematoma  - CT w/contrast pending

## 2018-11-30 NOTE — CONSULT NOTE ADULT - SUBJECTIVE AND OBJECTIVE BOX
Neurology Consult    Patient is a 68y old  Male who presents with a chief complaint of Abdominal distension (30 Nov 2018 11:41)      HPI:  69 yo M hx of ESRD on HD ( T W TH) DM HTN here for abdominal pain and distension during dialysis today. The patient is reported to have altered mental status.  He is not reliable to provide neurologic history or symptomology.  Attempted to call family no answer.  Pt has been found to have an acute lesion in left temporal lobe concerning for mass or hematoma, also noted to have areas of gliosis in bifrontorbital frontal lobes R>L and R PCA territory.        PAST MEDICAL & SURGICAL HISTORY:  ESRD (end stage renal disease) on dialysis  Type 2 diabetes mellitus with stage 4 chronic kidney disease, unspecified long term insulin use status  Essential hypertension  ESRD (end stage renal disease) on dialysis  No significant past surgical history      Allergies    No Known Allergies    Intolerances        MEDICATIONS  (STANDING):  atorvastatin 80 milliGRAM(s) Oral at bedtime  chlorhexidine 4% Liquid 1 Application(s) Topical two times a day  epoetin shakira Injectable 68389 Unit(s) IV Push <User Schedule>  famotidine    Tablet 20 milliGRAM(s) Oral daily  furosemide    Tablet 40 milliGRAM(s) Oral four times a day  isosorbide   mononitrate ER Tablet (IMDUR) 120 milliGRAM(s) Oral daily  lisinopril 20 milliGRAM(s) Oral daily  midodrine 5 milliGRAM(s) Oral <User Schedule>    MEDICATIONS  (PRN):      Social History: Denies tob, EtOH use     FAMILY HISTORY:          Physical Exam:   Vital Signs Last 24 Hrs  T(C): 36.5 (30 Nov 2018 12:39), Max: 37.1 (29 Nov 2018 22:07)  T(F): 97.7 (30 Nov 2018 12:39), Max: 98.7 (29 Nov 2018 22:07)  HR: 67 (30 Nov 2018 12:39) (67 - 70)  BP: 118/70 (30 Nov 2018 12:39) (97/60 - 124/62)  BP(mean): --  RR: 17 (30 Nov 2018 12:39) (17 - 18)  SpO2: 99% (30 Nov 2018 12:39) (97% - 99%)    General Exam:   General appearance: No acute distress                   Neurological Exam:  Mental Status: Patient is awake and alert to himself, he does follow commands however requires multiple prompts and some commands are not followed.  He can name glasses, pen, gloves however when transitioning he perseverates on previous item.  He is slow and makes errors when asked to repeat a sentence.  Limited spontaneous language production.    Cranial Nerves: EOMI, PERRL, VFF, V1-V3 intact, face symmetric, no dysarthria, tongue midline    Motor: left upper extremity drift.  There is atrophy throughout which is not asymmetric  Grossly 5/5 however I was unable to test specific muscle groups as patient did not follow all commands    Sensation:   Patient responds equally to pp however switches right and left, no neglect.    Labs:     CBC Full  -  ( 30 Nov 2018 06:30 )  WBC Count : 9.10 K/uL  Hemoglobin : 10.5 g/dL  Hematocrit : 33.2 %  Platelet Count - Automated : 143 K/uL  Mean Cell Volume : 93.0 fL  Mean Cell Hemoglobin : 29.4 pg  Mean Cell Hemoglobin Concentration : 31.6 %  Auto Neutrophil # : x  Auto Lymphocyte # : x  Auto Monocyte # : x  Auto Eosinophil # : x  Auto Basophil # : x  Auto Neutrophil % : x  Auto Lymphocyte % : x  Auto Monocyte % : x  Auto Eosinophil % : x  Auto Basophil % : x    11-30    138  |  97<L>  |  19  ----------------------------<  84  3.7   |  25  |  4.89<H>    Ca    9.3      30 Nov 2018 06:30  Phos  3.1     11-30  Mg     2.3     11-30    TPro  6.6  /  Alb  3.0<L>  /  TBili  0.7  /  DBili  x   /  AST  30  /  ALT  13  /  AlkPhos  235<H>  11-30    LIVER FUNCTIONS - ( 30 Nov 2018 06:30 )  Alb: 3.0 g/dL / Pro: 6.6 g/dL / ALK PHOS: 235 u/L / ALT: 13 u/L / AST: 30 u/L / GGT: x           PT/INR - ( 29 Nov 2018 06:00 )   PT: 11.6 SEC;   INR: 1.02          PTT - ( 29 Nov 2018 06:00 )  PTT:21.2 SEC      Radiology:

## 2018-11-30 NOTE — DISCHARGE NOTE ADULT - MEDICATION SUMMARY - MEDICATIONS TO TAKE
I will START or STAY ON the medications listed below when I get home from the hospital:    Keppra 500 mg oral tablet  -- 1 tab(s) by mouth once a day  -- Indication: For Abnormal EEG    atorvastatin 80 mg oral tablet  -- 1 tab(s) by mouth once a day (at bedtime)  -- Indication: For ICH (intracerebral hemorrhage)    epoetin shakira  -- Indication: For ESRD (end stage renal disease) on dialysis    famotidine 20 mg oral tablet  -- 1 tab(s) by mouth once a day  -- Indication: For Gaseous abdominal distention    midodrine 5 mg oral tablet  -- 1 tab(s) orally; every week 30 mins before dialysis  -- Indication: For ESRD (end stage renal disease) on dialysis    calcium acetate 667 mg oral tablet  -- 1 tab(s) by mouth once a day  -- Indication: For Supplement

## 2018-11-30 NOTE — DISCHARGE NOTE ADULT - HOME CARE AGENCY
[facility]  to resume CDPAS services 12/7/2018.  [facility]  [phone]  RN to see patient at home Sunday 12/9/2018.

## 2018-11-30 NOTE — SWALLOW BEDSIDE ASSESSMENT ADULT - PHARYNGEAL PHASE
no overt s/s of penetration/aspiration/Decreased laryngeal elevation Decreased laryngeal elevation/Cough post oral intake/Throat clear post oral intake Decreased laryngeal elevation/no overt s/s of penetration/aspiration

## 2018-11-30 NOTE — DISCHARGE NOTE ADULT - FUNCTIONAL SCREEN CURRENT LEVEL: AMBULATION, MLM
2 = assistive person 2 = assistive person/Rolling walker 3 = assistive equipment and person/Rolling walker

## 2018-11-30 NOTE — DISCHARGE NOTE ADULT - CARE PROVIDER_API CALL
Joaquim Desouza), Cardiovascular Disease; Internal Medicine; Nuclear Cardiology  [address] [place] [address]  Phone: [phone]  Fax: [phone]

## 2018-11-30 NOTE — CONSULT NOTE ADULT - ASSESSMENT
68 year old man with left temporal lobe lesion and multiple areas of CNS gliosis.  At this time it is unclear as what his CNS pathology is and follow up study including CT Head with contrast and CTA head and neck would be beneficial.  I would also like an MRI brain w/o contrast.  An EEG would be useful to assess if there are any areas of epileptogenesis or potential.  I also believe it is warranted to assess aortic valve and LVOT with SARAY 68 year old man with left temporal lobe lesion and multiple areas of CNS gliosis.  At this time it is unclear as what his CNS pathology is and follow up study including CT Head with contrast and CTA head and neck would be beneficial.  I would also like an MRI brain w/o contrast.  An EEG would be useful to assess if there are any areas of epileptogenesis or potential.  I also believe it is warranted to assess aortic valve and LVOT with SARAY    Would like to determine if left temporal lobe lesion is hemorrhage prior to initiating anti-platelet therapy given old CVA .

## 2018-11-30 NOTE — SWALLOW BEDSIDE ASSESSMENT ADULT - SWALLOW EVAL: DIAGNOSIS
1.	Patient presented with mild oral stage dysphagia thin liquids and nectar thickened liquids marked by large bolus acceptance, adequate bolus transfer and adequate bolus clearance.  2. Mild oral stage dysphagia noted for solids marked by increased mastication time however adequate breakdown of solids was noted when given extra time to masticate.  3. Patient presented with mild pharyngeal stage dysphagia for solids, thin liquids, and nectar thickened liquids marked by prompt swallow trigger, reduced [id number] elevation upon digital palpation and no overt s/s of penetration/aspiration viewed for solids and nectar thickened liquids.  Overt s/s of penetration/aspiration was viewed for thin liquids post swallow marked by coughing, throat clearing, and wet vocal quality.

## 2018-11-30 NOTE — CHART NOTE - NSCHARTNOTEFT_GEN_A_CORE
Ct head non-contrast shows mass vs resolving hematoma recommending MRI of head with contrast.  Unable to get MRI with contrast secondary to pt having erd on hd .  d/w Renal Dr. Cj Moffett ok to order ct head with contrast  to r/o mass vs resolving hematoma.  D/w medical attending Dr. Britton consulted Neurologist Dr. Tone Mclaughlin for further evaluation awaiting recs.

## 2018-11-30 NOTE — DISCHARGE NOTE ADULT - SECONDARY DIAGNOSIS.
Seizure as late effect of cerebrovascular accident (CVA) Pleural effusion on right ESRD (end stage renal disease) on dialysis HTN (hypertension) Traumatic hemorrhage of left cerebrum without loss of consciousness, initial encounter Lung nodule

## 2018-11-30 NOTE — DISCHARGE NOTE ADULT - CARE PLAN
Principal Discharge DX:	Abdominal distension  Secondary Diagnosis:	Seizure as late effect of cerebrovascular accident (CVA)  Assessment and plan of treatment:	EEG:  Secondary Diagnosis:	Pleural effusion on right  Secondary Diagnosis:	ESRD (end stage renal disease) on dialysis  Assessment and plan of treatment:	continue HD TTS via AVF  Secondary Diagnosis:	HTN (hypertension)  Secondary Diagnosis:	Traumatic hemorrhage of left cerebrum without loss of consciousness, initial encounter  Assessment and plan of treatment:	MRI brain - parenchymal and SAH in left temporal region   holding antiplatelets   follow up outpatient repeat MRI brain in 6-8 weeks  Secondary Diagnosis:	Lung nodule  Assessment and plan of treatment:	CT scan showed lung nodule measuring up to 3mm. One year follow up of CT recommended  May have repeat MRI brain in 6 to 8 weeks to assess for underlying mass. Principal Discharge DX:	Abdominal distension  Assessment and plan of treatment:	s/p diagnostic tap 2/2 cardiac ascites   ?constrictive pericarditis   follow up outpatient cardiology   return to ED sooner if symptoms worsen such as difficulty breathing, confusion,  Secondary Diagnosis:	Seizure as late effect of cerebrovascular accident (CVA)  Assessment and plan of treatment:	EEG:  Continue Keppra 500mg daily   Neuro followup  Secondary Diagnosis:	Pleural effusion on right  Assessment and plan of treatment:	Too small to tap, not a candidate for VATS  Stable right loculated effusion  Pulmonology follow up  Secondary Diagnosis:	ESRD (end stage renal disease) on dialysis  Assessment and plan of treatment:	continue HD   renal diet   Follow up outpatient  Recommend Nephro-david 1cap daily  continue epotein shakira injectable for anemia due to chronic kidney disease   continue gi ppx with famotidine 20mg  Secondary Diagnosis:	HTN (hypertension)  Assessment and plan of treatment:	Monitor blood pressure closely   blood pressure meds were held due to orthostatics  BP's have maintained <140 for hospital stay   Follow up outpatient  Secondary Diagnosis:	Traumatic hemorrhage of left cerebrum without loss of consciousness, initial encounter  Assessment and plan of treatment:	MRI brain - parenchymal and SAH in left temporal region   holding antiplatelets for another 7-10 days  follow up physical therapy  Use assistive device while ambulating   follow up outpatient repeat MRI brain in 6-8 weeks  Neuro follow up  Secondary Diagnosis:	Lung nodule  Assessment and plan of treatment:	CT scan showed lung nodule measuring up to 3mm. One year follow up of CT recommended  May have repeat MRI brain in 6 to 8 weeks to assess for underlying mass. Principal Discharge DX:	Abdominal distension  Assessment and plan of treatment:	s/p diagnostic tap 2/2 cardiac ascites   ?constrictive pericarditis   follow up outpatient cardiology   return to ED sooner if symptoms worsen such as fever, difficulty breathing, and confusion  Secondary Diagnosis:	Seizure as late effect of cerebrovascular accident (CVA)  Assessment and plan of treatment:	abnormal EEG  Continue Keppra 500mg daily   Neuro followup  Secondary Diagnosis:	Pleural effusion on right  Assessment and plan of treatment:	Too small to tap, not a candidate for VATS  Stable right loculated effusion  Pulmonology follow up  Secondary Diagnosis:	ESRD (end stage renal disease) on dialysis  Assessment and plan of treatment:	continue HD   renal diet   Follow up outpatient  Recommend Nephro-david 1cap daily  continue epotein shakira injectable for anemia due to chronic kidney disease   continue gi ppx with famotidine 20mg  Secondary Diagnosis:	HTN (hypertension)  Assessment and plan of treatment:	Monitor blood pressure closely   blood pressure meds were held due to orthostatics  BP's have maintained <140 for hospital stay   Follow up outpatient  Secondary Diagnosis:	Traumatic hemorrhage of left cerebrum without loss of consciousness, initial encounter  Assessment and plan of treatment:	MRI brain - parenchymal and SAH in left temporal region   holding antiplatelets for another 7-10 days  follow up physical therapy  Use assistive device while ambulating   follow up outpatient repeat MRI brain in 6-8 weeks  Neuro follow up  Secondary Diagnosis:	Lung nodule  Assessment and plan of treatment:	CT scan showed lung nodule measuring up to 3mm  One year follow up of CT recommended  May have repeat MRI brain in 6 to 8 weeks to assess for underlying mass. Principal Discharge DX:	Abdominal distension  Assessment and plan of treatment:	s/p diagnostic tap 2/2 cardiac ascites  follow up outpatient cardiology   return to ED sooner if symptoms worsen such as fever, difficulty breathing, and confusion  Secondary Diagnosis:	Seizure as late effect of cerebrovascular accident (CVA)  Assessment and plan of treatment:	abnormal EEG  Continue Keppra 500mg daily   Neuro followup  Secondary Diagnosis:	Pleural effusion on right  Assessment and plan of treatment:	Too small to tap, not a candidate for VATS  Stable right loculated effusion  Pulmonology follow up  Secondary Diagnosis:	ESRD (end stage renal disease) on dialysis  Assessment and plan of treatment:	continue HD   renal diet   Follow up outpatient  Recommend Nephro-david 1cap daily  continue epotein shakira injectable for anemia due to chronic kidney disease   continue gi ppx with famotidine 20mg  Secondary Diagnosis:	HTN (hypertension)  Assessment and plan of treatment:	Monitor blood pressure closely   blood pressure meds were held due to orthostatics  BP's have maintained <140 for hospital stay   Follow up outpatient  Secondary Diagnosis:	Traumatic hemorrhage of left cerebrum without loss of consciousness, initial encounter  Assessment and plan of treatment:	MRI brain - parenchymal and SAH in left temporal region   holding antiplatelets for another 7-10 days  follow up physical therapy  Use assistive device while ambulating   follow up outpatient for repeat MRI brain in 6-8 weeks  Neuro follow up  Secondary Diagnosis:	Lung nodule  Assessment and plan of treatment:	CT scan showed lung nodule measuring up to 3mm  One year follow up of CT recommended Principal Discharge DX:	Abdominal distension  Goal:	Resolution of abdominal distension  Assessment and plan of treatment:	s/p diagnostic tap 2/2 cardiac ascites  follow up outpatient cardiology   return to ED sooner if symptoms worsen such as fever, difficulty breathing, and confusion  Secondary Diagnosis:	Seizure as late effect of cerebrovascular accident (CVA)  Assessment and plan of treatment:	abnormal EEG  Continue Keppra 500mg daily   Neuro followup  Secondary Diagnosis:	Pleural effusion on right  Assessment and plan of treatment:	Too small to tap, not a candidate for VATS  Stable right loculated effusion  Pulmonology follow up  Secondary Diagnosis:	ESRD (end stage renal disease) on dialysis  Assessment and plan of treatment:	continue HD   renal diet   Follow up outpatient  Recommend Nephro-david 1cap daily  continue epotein shakira injectable for anemia due to chronic kidney disease   continue gi ppx with famotidine 20mg  Secondary Diagnosis:	HTN (hypertension)  Assessment and plan of treatment:	Monitor blood pressure closely   blood pressure meds were held due to orthostatics  BP's have maintained <140 for hospital stay   Follow up outpatient  Secondary Diagnosis:	Traumatic hemorrhage of left cerebrum without loss of consciousness, initial encounter  Assessment and plan of treatment:	MRI brain - parenchymal and SAH in left temporal region   holding antiplatelets for another 7-10 days  follow up physical therapy  Use assistive device while ambulating   follow up outpatient for repeat MRI brain in 6-8 weeks  Neuro follow up  Secondary Diagnosis:	Lung nodule  Assessment and plan of treatment:	CT scan showed lung nodule measuring up to 3mm  One year follow up of CT recommended

## 2018-11-30 NOTE — PROGRESS NOTE ADULT - PROBLEM SELECTOR PLAN 1
- CT Abd noted with large volume ascites  - suspect in setting of volume overload; pBNP >37741  - TTE notable for EF 80-85% and mild LVOT obstruction   - s/p diagnostic tap 11/29, no SBP  - pending Therapeutic paracentesis with procedure team given large volume ascites on Monday once off Plavix - TTE notable for EF 80-85% and mild LVOT obstruction   - pending Therapeutic paracentesis with procedure team given large volume ascites on Monday once off Plavix  - s/p diagnostic tap 11/29 reviewed with Cardiac Ascites

## 2018-11-30 NOTE — DISCHARGE NOTE ADULT - HOSPITAL COURSE
67 yo M hx of ESRD on HD ( [facility]) DM HTN here for abdominal pain and distension during dialysis today. pt was at dialysis, began to feels generalized abdominal discomfort and was sent to ED. Reports over the past 2 weeks he had been to [facility] and dx with PNA and tx with abx and [facility] for abdominal pain/distension and was told everything is OK and d/c home. Reports that today at dialysis the pain started again so came to ED. Able to pass flatus. Denies cp sob weakness. CT abdomen reveals moderate ascites. Labs indicate mildly elevated Lipase.    ABDOMINAL DISTENTION   -11/29 s/p diagnostic paracentesis by procedure team: results consistent with cardiac ascites  -plavix held   - f/u therapeutic tap on 12/3 please call procedure team monday morning:-----------------------------  -Echo ordered----------------------------------------  -CT abdomen and pelvis: 11/27-Large volume of ascites.   Small right pleural effusion with adjacent compressive atelectasis. Trace   pericardial effusion.  -US Abd:   Large amount of abdominal and pelvic ascites, the largest pocket in   the left lower quadrant.  * Right pleural effusion.    AMS   -CT HEAD:Vague hyperattenuation and subtle mass effect in the left temporal lobe may represent underlying mass or resolving subacute hematoma.  Follow-up contrast enhanced MRI is recommended for further characterization.Multifocal areas of encephalomalacia/gliosis as discussed above.  -S&S: Dysphagia 3 with nectar thickened fluids  -11/30 transfer to telemetry r/o afib   -Neuro Consulted awaiting recs-----------------------------------------  -F/u CTA head-------------------  -f/u EEG ----------------    ESRD (end stage renal disease) on dialysis.    -renal following   -HD as per Renal  - monitor lychemo.     Benign essential hypertension.    - Monitor on current meds.     CHF   -Cards Following   ECHO: Mitral annular calcification, otherwise normal mitral  valve.  No systolic anterior motion (DAFNE) of the mitral  valve. Mild mitral regurgitation. Aortic valve not well visualized; appears calcified. Dueto the presence of poor echocardiographic windows and the presence of LVOT obstruction, aortic valve gradients were unable to be obtained.  Minimal aortic regurgitation. Hyperdynamic left ventricle. Peak left ventricular outflow tract gradient equals 21 mm Hg, consistent with mild LVOT obstruction. The right ventricle is not well visualized; grossly normal right ventricular systolic function. 69 yo M hx of ESRD on HD ( [facility]) DM HTN here for abdominal pain and distension during dialysis today. pt was at dialysis, began to feels generalized abdominal discomfort and was sent to ED. Reports over the past 2 weeks he had been to [facility] and dx with PNA and tx with abx and [facility] for abdominal pain/distension and was told everything is OK and d/c home. Reports that today at dialysis the pain started again so came to ED. Able to pass flatus. Denies cp sob weakness. CT abdomen reveals moderate ascites. Labs indicate mildly elevated Lipase.    ABDOMINAL DISTENTION   -11/29 s/p diagnostic paracentesis by procedure team: results consistent with cardiac ascites  -plavix held   -CT abdomen and pelvis: 11/27-Large volume of ascites. Small right pleural effusion with adjacent compressive atelectasis. Trace pericardial effusion.  -US Abd:   Large amount of abdominal and pelvic ascites, the largest pocket in   the left lower quadrant. * Right pleural effusion.      12/2  CT chest:  Partially loculated moderate-sized right pleural effusion, small   pericardial effusion and/or pleural thickening, and partially visualized   large volume of ascites. Constrictive pericarditis is a consideration.   Echo recommended for complete evaluation.    Small lung nodules measure up to 3 mm. One-year follow-up CT recommended   if there are risk factors for malignancy    CTA H/N: Atherosclerotic vascular calcification of the aortic arch, great vessel origins, and carotid arteries origins without hemodynamic significant stenosis.  Atherosclerotic vascular calcification in the cavernous and supraclinoid  ICA segments with stenosis, patent flow noted within the proximal anterior, middle, and posterior cerebral arteries.  Heterogeneity in the left temporal region with opacification in the left mastoid and left aditus ad antrum with thinning of the left tegmen, dehiscence with possible extension of infection and/or inflammatory change not excluded, MR may better delineate this finding.  Large partially visualized pleural effusion and opacification in the right hemithorax suggest dedicated imaging of the chest.  Cervicothoracic levocurvature with a C3 compression deformity with loss of 90% height fusion of   C5 -C6 vertebra with multilevel degenerative change. Foci of sclerosis partially visualized in T4 vertebra of uncertain etiology correlation with PET/CT or bone scan may be obtained as clinically warranted      AMS   -CT HEAD:Vague hyperattenuation and subtle mass effect in the left temporal lobe may represent underlying mass or resolving subacute hematoma.  Follow-up contrast enhanced MRI is recommended for further characterization.Multifocal areas of encephalomalacia/gliosis as discussed above.    12/4 MR Head: Parenchymal and subarachnoid hemorrhage suspected involving   the left temporal region. Clinical correlation and continued close interval follow-up until resolution is recommended. Areas of encephalomalacia and gliosis as described above.        Abnormal EEG study.  1. Potential epileptogenic focus in the left midtemporal region.   2. Structural abnormality in the left temporal region.   3. Mild nonspecific diffuse or multifocal cerebral dysfunction.   4. No seizure seen.      ECHO: EF 80-85% Mitral annular calcification, otherwise normal mitral valve.  No systolic anterior motion (DAFNE) of the mitral valve. Mild mitral regurgitation. Aortic valve not well visualized; appears calcified. Due to the presence of poor echocardiographic windows and the presence of LVOT obstruction, aortic valve gradients were unable to be obtained.  Minimal aortic regurgitation. Hyperdynamic left ventricle. Peak left ventricular outflow tract gradient equals 21 mm Hg, consistent with mild LVOT obstruction. The right ventricle is not well visualized; grossly normal right ventricular systolic function. 69 yo M hx of ESRD on HD ( [facility]) DM HTN here for abdominal pain and distension during dialysis today. pt was at dialysis, began to feels generalized abdominal discomfort and was sent to ED. Reports over the past 2 weeks he had been to [facility] and dx with PNA and tx with abx and [facility] for abdominal pain/distension and was told everything is OK and d/c home. Reports that today at dialysis the pain started again so came to ED. Able to pass flatus. Denies cp sob weakness. CT abdomen reveals moderate ascites. Labs indicate mildly elevated Lipase.    ABDOMINAL DISTENTION   -11/29 s/p diagnostic paracentesis by procedure team: results consistent with cardiac ascites  -plavix held   -CT abdomen and pelvis: 11/27-Large volume of ascites. Small right pleural effusion with adjacent compressive atelectasis. Trace pericardial effusion.  -US Abd:   Large amount of abdominal and pelvic ascites, the largest pocket in   the left lower quadrant. * Right pleural effusion.      12/2  CT chest:  Partially loculated moderate-sized right pleural effusion, small   pericardial effusion and/or pleural thickening, and partially visualized   large volume of ascites. Constrictive pericarditis is a consideration.   Echo recommended for complete evaluation.    Small lung nodules measure up to 3 mm. One-year follow-up CT recommended if there are risk factors for malignancy    CTA H/N: Atherosclerotic vascular calcification of the aortic arch, great vessel origins, and carotid arteries origins without hemodynamic significant stenosis.  Atherosclerotic vascular calcification in the cavernous and supraclinoid  ICA segments with stenosis, patent flow noted within the proximal anterior, middle, and posterior cerebral arteries.  Heterogeneity in the left temporal region with opacification in the left mastoid and left aditus ad antrum with thinning of the left tegmen, dehiscence with possible extension of infection and/or inflammatory change not excluded, MR may better delineate this finding.  Large partially visualized pleural effusion and opacification in the right hemithorax suggest dedicated imaging of the chest.  Cervicothoracic levocurvature with a C3 compression deformity with loss of 90% height fusion of   C5 -C6 vertebra with multilevel degenerative change. Foci of sclerosis partially visualized in T4 vertebra of uncertain etiology correlation with PET/CT or bone scan may be obtained as clinically warranted      AMS   -CT HEAD:Vague hyperattenuation and subtle mass effect in the left temporal lobe may represent underlying mass or resolving subacute hematoma.  Follow-up contrast enhanced MRI is recommended for further characterization.Multifocal areas of encephalomalacia/gliosis as discussed above.    12/4 MR Head: Parenchymal and subarachnoid hemorrhage suspected involving   the left temporal region. Clinical correlation and continued close interval follow-up until resolution is recommended. Areas of encephalomalacia and gliosis as described above.        Abnormal EEG study.  1. Potential epileptogenic focus in the left midtemporal region.   2. Structural abnormality in the left temporal region.   3. Mild nonspecific diffuse or multifocal cerebral dysfunction.   4. No seizure seen.      ECHO: EF 80-85% Mitral annular calcification, otherwise normal mitral valve.  No systolic anterior motion (DAFNE) of the mitral valve. Mild mitral regurgitation. Aortic valve not well visualized; appears calcified. Due to the presence of poor echocardiographic windows and the presence of LVOT obstruction, aortic valve gradients were unable to be obtained.  Minimal aortic regurgitation. Hyperdynamic left ventricle. Peak left ventricular outflow tract gradient equals 21 mm Hg, consistent with mild LVOT obstruction. The right ventricle is not well visualized; grossly normal right ventricular systolic function.

## 2018-12-01 LAB
ALBUMIN SERPL ELPH-MCNC: 2.8 G/DL — LOW (ref 3.3–5)
ALP SERPL-CCNC: 208 U/L — HIGH (ref 40–120)
ALT FLD-CCNC: 13 U/L — SIGNIFICANT CHANGE UP (ref 4–41)
AST SERPL-CCNC: 29 U/L — SIGNIFICANT CHANGE UP (ref 4–40)
BILIRUB SERPL-MCNC: 0.7 MG/DL — SIGNIFICANT CHANGE UP (ref 0.2–1.2)
BUN SERPL-MCNC: 23 MG/DL — SIGNIFICANT CHANGE UP (ref 7–23)
CALCIUM SERPL-MCNC: 9.1 MG/DL — SIGNIFICANT CHANGE UP (ref 8.4–10.5)
CHLORIDE SERPL-SCNC: 96 MMOL/L — LOW (ref 98–107)
CO2 SERPL-SCNC: 26 MMOL/L — SIGNIFICANT CHANGE UP (ref 22–31)
CREAT SERPL-MCNC: 6.01 MG/DL — HIGH (ref 0.5–1.3)
GLUCOSE SERPL-MCNC: 86 MG/DL — SIGNIFICANT CHANGE UP (ref 70–99)
HCT VFR BLD CALC: 30.6 % — LOW (ref 39–50)
HGB BLD-MCNC: 10.1 G/DL — LOW (ref 13–17)
MAGNESIUM SERPL-MCNC: 2.2 MG/DL — SIGNIFICANT CHANGE UP (ref 1.6–2.6)
MCHC RBC-ENTMCNC: 30.7 PG — SIGNIFICANT CHANGE UP (ref 27–34)
MCHC RBC-ENTMCNC: 33 % — SIGNIFICANT CHANGE UP (ref 32–36)
MCV RBC AUTO: 93 FL — SIGNIFICANT CHANGE UP (ref 80–100)
NRBC # FLD: 0 — SIGNIFICANT CHANGE UP
PHOSPHATE SERPL-MCNC: 3.8 MG/DL — SIGNIFICANT CHANGE UP (ref 2.5–4.5)
PLATELET # BLD AUTO: 134 K/UL — LOW (ref 150–400)
PMV BLD: 12 FL — SIGNIFICANT CHANGE UP (ref 7–13)
POTASSIUM SERPL-MCNC: 3.4 MMOL/L — LOW (ref 3.5–5.3)
POTASSIUM SERPL-SCNC: 3.4 MMOL/L — LOW (ref 3.5–5.3)
PROT SERPL-MCNC: 6.3 G/DL — SIGNIFICANT CHANGE UP (ref 6–8.3)
RBC # BLD: 3.29 M/UL — LOW (ref 4.2–5.8)
RBC # FLD: 17.4 % — HIGH (ref 10.3–14.5)
SODIUM SERPL-SCNC: 137 MMOL/L — SIGNIFICANT CHANGE UP (ref 135–145)
SPECIMEN SOURCE: SIGNIFICANT CHANGE UP
SPECIMEN SOURCE: SIGNIFICANT CHANGE UP
WBC # BLD: 8.17 K/UL — SIGNIFICANT CHANGE UP (ref 3.8–10.5)
WBC # FLD AUTO: 8.17 K/UL — SIGNIFICANT CHANGE UP (ref 3.8–10.5)

## 2018-12-01 PROCEDURE — 71045 X-RAY EXAM CHEST 1 VIEW: CPT | Mod: 26

## 2018-12-01 PROCEDURE — 95819 EEG AWAKE AND ASLEEP: CPT | Mod: 26

## 2018-12-01 RX ORDER — LEVETIRACETAM 250 MG/1
500 TABLET, FILM COATED ORAL DAILY
Qty: 0 | Refills: 0 | Status: DISCONTINUED | OUTPATIENT
Start: 2018-12-01 | End: 2018-12-07

## 2018-12-01 RX ORDER — POTASSIUM CHLORIDE 20 MEQ
40 PACKET (EA) ORAL ONCE
Qty: 0 | Refills: 0 | Status: DISCONTINUED | OUTPATIENT
Start: 2018-12-01 | End: 2018-12-01

## 2018-12-01 RX ADMIN — ERYTHROPOIETIN 10000 UNIT(S): 10000 INJECTION, SOLUTION INTRAVENOUS; SUBCUTANEOUS at 07:48

## 2018-12-01 RX ADMIN — CHLORHEXIDINE GLUCONATE 1 APPLICATION(S): 213 SOLUTION TOPICAL at 05:55

## 2018-12-01 RX ADMIN — CHLORHEXIDINE GLUCONATE 1 APPLICATION(S): 213 SOLUTION TOPICAL at 19:11

## 2018-12-01 RX ADMIN — FAMOTIDINE 20 MILLIGRAM(S): 10 INJECTION INTRAVENOUS at 12:24

## 2018-12-01 RX ADMIN — Medication 40 MILLIGRAM(S): at 12:24

## 2018-12-01 RX ADMIN — ISOSORBIDE MONONITRATE 120 MILLIGRAM(S): 60 TABLET, EXTENDED RELEASE ORAL at 12:24

## 2018-12-01 RX ADMIN — ATORVASTATIN CALCIUM 80 MILLIGRAM(S): 80 TABLET, FILM COATED ORAL at 22:48

## 2018-12-01 RX ADMIN — Medication 40 MILLIGRAM(S): at 19:11

## 2018-12-01 RX ADMIN — LISINOPRIL 20 MILLIGRAM(S): 2.5 TABLET ORAL at 12:24

## 2018-12-01 RX ADMIN — LEVETIRACETAM 500 MILLIGRAM(S): 250 TABLET, FILM COATED ORAL at 19:11

## 2018-12-01 RX ADMIN — MIDODRINE HYDROCHLORIDE 5 MILLIGRAM(S): 2.5 TABLET ORAL at 05:54

## 2018-12-01 NOTE — PROGRESS NOTE ADULT - PROBLEM SELECTOR PLAN 2
Neuro eval noted. need to r/o CVA vs hematoma. CTA findings noted, awaiting MRI. EEG is abnormal, consider anti epileptics

## 2018-12-01 NOTE — PROGRESS NOTE ADULT - PROBLEM SELECTOR PLAN 2
- CT Head noting subtle mass effect in the left temporal lobe may represent underlying mass or resolving subacute hematoma  - CT w/contrast pending

## 2018-12-01 NOTE — PROGRESS NOTE ADULT - PROBLEM SELECTOR PLAN 1
- TTE notable for EF 80-85% and mild LVOT obstruction   - pending Therapeutic paracentesis with procedure team given large volume ascites on Monday once off Plavix  - s/p diagnostic tap 11/29 reviewed with Cardiac Ascites

## 2018-12-01 NOTE — EEG REPORT - NS EEG TEXT BOX
[facility]   COMPREHENSIVE EPILEPSY CENTER   REPORT OF ROUTINE VIDEO EEG     [facility]: [address], [place] [address], Ph#: [phone]  [facility]: [address], [place] [address], Ph#: [phone]  Office: [address] [place] [address], [facility] [address], [place] [address] Ph#: [phone]    Patient Name: MAGGIE BRANDON  Age and : 68y (54)  MRN #: [id number]  Location: [facility]   Referring Physician: Zhanna Britton    Study Date: 18    _____________________________________________________________  TECHNICAL INFORMATION    Placement and Labeling of Electrodes:  The EEG was performed utilizing 20 channels referential EEG connections (coronal over temporal over parasagittal montage) using all standard 10-20 electrode placements with EKG.  Recording was at a sampling rate of 256 samples per second per channel.  Time synchronized digital video recording was done simultaneously with EEG recording.  A low light infrared camera was used for low light recording.  Julio and seizure detection algorithms were utilized.    _____________________________________________________________  HISTORY    Patient is a 68y old  Male who presents with a chief complaint of Abdominal distension (01 Dec 2018 12:24)    PERTINENT MEDICATION:  No AEDs  _____________________________________________________________  STUDY INTERPRETATION    Findings: The background was continuous, spontaneously variable and reactive. During wakefulness, the posterior dominant rhythm consisted of symmetric, well-modulated 7 Hz activity, with amplitude to 30 uV, that attenuated to eye opening.  Low amplitude frontal beta was noted in wakefulness.    Background Slowing:  Background predominantly consisted of theta, delta and faster activities.    Focal Slowing:   Continuous left polymorphic delta and the activities over the left temporal region.     Sleep Background:  Stage II sleep transients were not recorded.    Other Non-Epileptiform Findings:  None were present.    Interictal Epileptiform Activity:   Occasional Left Mid-Temporal Julio Wave Discharges (Max T7)    Events:  Clinical events: None recorded.  Seizures: None recorded.    Activation Procedures:   Hyperventilation was not performed.    Photic stimulation was not performed.     Artifacts:  Intermittent myogenic and movement artifacts were noted.    ECG:  The heart rate on single channel ECG was predominantly between 60-80 BPM.    _____________________________________________________________  EEG SUMMARY/CLASSIFICATION    Abnormal EEG in the awake states.  - Occasional Left Mid-Temporal Julio Wave Discharges (Max T7)  - Continuous left polymorphic delta and the activities over the left temporal region.   - Mild generalized slowing.    _____________________________________________________________  EEG IMPRESSION/CLINICAL CORRELATE  Abnormal EEG study.  1. Potential epileptogenic focus in the left midtemporal region.   2. Structural abnormality in the left temporal region.   3. Mild nonspecific diffuse or multifocal cerebral dysfunction.   4. No seizure seen.      Preliminary Fellow Read:      Eliz Solo MD  Adult EEG Fellow, [facility]   COMPREHENSIVE EPILEPSY CENTER   REPORT OF ROUTINE VIDEO EEG     [facility]: [address], [place] [address], Ph#: [phone]  [facility]: [address], [place] [address], Ph#: [phone]  Office: [address] [place] [address], [facility] [address], [place] [address] Ph#: [phone]    Patient Name: MAGGIE BRANDON  Age and : 68y (54)  MRN #: [id number]  Location: [facility] [address]   Referring Physician: Zhanna Britton    Study Date: 18    _____________________________________________________________  TECHNICAL INFORMATION    Placement and Labeling of Electrodes:  The EEG was performed utilizing 20 channels referential EEG connections (coronal over temporal over parasagittal montage) using all standard 10-20 electrode placements with EKG.  Recording was at a sampling rate of 256 samples per second per channel.  Time synchronized digital video recording was done simultaneously with EEG recording.  A low light infrared camera was used for low light recording.  Julio and seizure detection algorithms were utilized.    _____________________________________________________________  HISTORY    Patient is a 68y old  Male who presents with a chief complaint of Abdominal distension (01 Dec 2018 12:24)    PERTINENT MEDICATION:  No AEDs  _____________________________________________________________  STUDY INTERPRETATION    Findings: The background was continuous, spontaneously variable and reactive. During wakefulness, the posterior dominant rhythm consisted of symmetric, well-modulated 7 Hz activity, with amplitude to 30 uV, that attenuated to eye opening.  Low amplitude frontal beta was noted in wakefulness.    Background Slowing:  Background predominantly consisted of theta, delta and faster activities.    Focal Slowing:   Continuous left polymorphic delta and the activities over the left temporal region.     Sleep Background:  Stage II sleep transients were not recorded.    Other Non-Epileptiform Findings:  None were present.    Interictal Epileptiform Activity:   Occasional Left Mid-Temporal Julio Wave Discharges (Max T7)    Events:  Clinical events: None recorded.  Seizures: None recorded.    Activation Procedures:   Hyperventilation was not performed.    Photic stimulation was not performed.     Artifacts:  Intermittent myogenic and movement artifacts were noted.    ECG:  The heart rate on single channel ECG was predominantly between 60-80 BPM.    _____________________________________________________________  EEG SUMMARY/CLASSIFICATION    Abnormal EEG in the awake states.  - Occasional Left Mid-Temporal Julio Wave Discharges (Max T7)  - Continuous left polymorphic delta and the activities over the left temporal region.   - Mild generalized slowing.    _____________________________________________________________  EEG IMPRESSION/CLINICAL CORRELATE  Abnormal EEG study.  1. Potential epileptogenic focus in the left midtemporal region.   2. Structural abnormality in the left temporal region.   3. Mild nonspecific diffuse or multifocal cerebral dysfunction.   4. No seizure seen.    Eliz Solo MD  Adult EEG Fellow, [facility]    Kj Rascon MD

## 2018-12-02 DIAGNOSIS — J90 PLEURAL EFFUSION, NOT ELSEWHERE CLASSIFIED: ICD-10-CM

## 2018-12-02 LAB
ALBUMIN SERPL ELPH-MCNC: 3.2 G/DL — LOW (ref 3.3–5)
ALP SERPL-CCNC: 223 U/L — HIGH (ref 40–120)
ALT FLD-CCNC: 18 U/L — SIGNIFICANT CHANGE UP (ref 4–41)
AST SERPL-CCNC: 34 U/L — SIGNIFICANT CHANGE UP (ref 4–40)
BACTERIA NPH CULT: SIGNIFICANT CHANGE UP
BILIRUB SERPL-MCNC: 0.9 MG/DL — SIGNIFICANT CHANGE UP (ref 0.2–1.2)
BUN SERPL-MCNC: 15 MG/DL — SIGNIFICANT CHANGE UP (ref 7–23)
CALCIUM SERPL-MCNC: 9.5 MG/DL — SIGNIFICANT CHANGE UP (ref 8.4–10.5)
CHLORIDE SERPL-SCNC: 98 MMOL/L — SIGNIFICANT CHANGE UP (ref 98–107)
CO2 SERPL-SCNC: 27 MMOL/L — SIGNIFICANT CHANGE UP (ref 22–31)
CREAT SERPL-MCNC: 4.59 MG/DL — HIGH (ref 0.5–1.3)
GLUCOSE SERPL-MCNC: 68 MG/DL — LOW (ref 70–99)
HCT VFR BLD CALC: 34 % — LOW (ref 39–50)
HGB BLD-MCNC: 10.5 G/DL — LOW (ref 13–17)
HIV 1+2 AB+HIV1 P24 AG SERPL QL IA: SIGNIFICANT CHANGE UP
MAGNESIUM SERPL-MCNC: 2.3 MG/DL — SIGNIFICANT CHANGE UP (ref 1.6–2.6)
MCHC RBC-ENTMCNC: 29.7 PG — SIGNIFICANT CHANGE UP (ref 27–34)
MCHC RBC-ENTMCNC: 30.9 % — LOW (ref 32–36)
MCV RBC AUTO: 96 FL — SIGNIFICANT CHANGE UP (ref 80–100)
NRBC # FLD: 0 — SIGNIFICANT CHANGE UP
PHOSPHATE SERPL-MCNC: 3.1 MG/DL — SIGNIFICANT CHANGE UP (ref 2.5–4.5)
PLATELET # BLD AUTO: 137 K/UL — LOW (ref 150–400)
PMV BLD: 11.5 FL — SIGNIFICANT CHANGE UP (ref 7–13)
POTASSIUM SERPL-MCNC: 3.7 MMOL/L — SIGNIFICANT CHANGE UP (ref 3.5–5.3)
POTASSIUM SERPL-SCNC: 3.7 MMOL/L — SIGNIFICANT CHANGE UP (ref 3.5–5.3)
PROT SERPL-MCNC: 6.8 G/DL — SIGNIFICANT CHANGE UP (ref 6–8.3)
RBC # BLD: 3.54 M/UL — LOW (ref 4.2–5.8)
RBC # FLD: 17.7 % — HIGH (ref 10.3–14.5)
SODIUM SERPL-SCNC: 141 MMOL/L — SIGNIFICANT CHANGE UP (ref 135–145)
WBC # BLD: 8.22 K/UL — SIGNIFICANT CHANGE UP (ref 3.8–10.5)
WBC # FLD AUTO: 8.22 K/UL — SIGNIFICANT CHANGE UP (ref 3.8–10.5)

## 2018-12-02 PROCEDURE — 93010 ELECTROCARDIOGRAM REPORT: CPT

## 2018-12-02 PROCEDURE — 71250 CT THORAX DX C-: CPT | Mod: 26

## 2018-12-02 RX ORDER — FUROSEMIDE 40 MG
40 TABLET ORAL
Qty: 0 | Refills: 0 | Status: DISCONTINUED | OUTPATIENT
Start: 2018-12-02 | End: 2018-12-02

## 2018-12-02 RX ORDER — SODIUM CHLORIDE 9 MG/ML
250 INJECTION INTRAMUSCULAR; INTRAVENOUS; SUBCUTANEOUS ONCE
Qty: 0 | Refills: 0 | Status: COMPLETED | OUTPATIENT
Start: 2018-12-02 | End: 2018-12-02

## 2018-12-02 RX ADMIN — SODIUM CHLORIDE 1000 MILLILITER(S): 9 INJECTION INTRAMUSCULAR; INTRAVENOUS; SUBCUTANEOUS at 14:55

## 2018-12-02 RX ADMIN — CHLORHEXIDINE GLUCONATE 1 APPLICATION(S): 213 SOLUTION TOPICAL at 06:55

## 2018-12-02 RX ADMIN — FAMOTIDINE 20 MILLIGRAM(S): 10 INJECTION INTRAVENOUS at 12:58

## 2018-12-02 RX ADMIN — ATORVASTATIN CALCIUM 80 MILLIGRAM(S): 80 TABLET, FILM COATED ORAL at 22:14

## 2018-12-02 RX ADMIN — LEVETIRACETAM 500 MILLIGRAM(S): 250 TABLET, FILM COATED ORAL at 12:58

## 2018-12-02 RX ADMIN — LISINOPRIL 20 MILLIGRAM(S): 2.5 TABLET ORAL at 06:55

## 2018-12-02 RX ADMIN — Medication 40 MILLIGRAM(S): at 06:55

## 2018-12-02 RX ADMIN — CHLORHEXIDINE GLUCONATE 1 APPLICATION(S): 213 SOLUTION TOPICAL at 17:03

## 2018-12-02 NOTE — CONSULT NOTE ADULT - SUBJECTIVE AND OBJECTIVE BOX
Patient is a 68y old  Male who presents with a chief complaint of Abdominal distension (02 Dec 2018 13:14)      HPI:  67 yo M hx of ESRD on HD ( T W TH) DM HTN here for abdominal pain and distension during dialysis today. pt was at dialysis, began to feels generalized abdominal discomfort and was sent to ED. Reports over the past 2 weeks he had been to [facility] and dx with PNA and tx with ab and [facility] for abdominal pain/distension and was told everything is OK and d/c home. Reports that today at dialysis the pain started again so came to ED. Able to pass flatus. Denies cp sob weakness. CT abdomen reveals moderate ascites. Labs indicate mildly elevated Lipase. (27 Nov 2018 19:20)   pt can not provide noy hisotry at this time time as he seems very confused:     spoke to his wife: per her he does have SOB on exertion: He has no underlying pulmonary disease: hE WAS TREATED FOR [facility] AND AFTER HE WAS DCED HE WAS ALWAYS CONFUSED : aND WHEN HE WENT FOR DILAYSIS his belly was distended and he was SOB   he did have pneumonia but never had tuberculosis    ?FOLLOWING PRESENT  [unk ] Hx of PE/DVT, [unk ] Hx COPD, [unk ] Hx of Asthma, [y Hx of Hospitalization, [ unk]  Hx of BiPAP/CPAP use, [ ] Hx of SANAM    Allergies    No Known Allergies    Intolerances        PAST MEDICAL & SURGICAL HISTORY:  ESRD (end stage renal disease) on dialysis  Type 2 diabetes mellitus with stage 4 chronic kidney disease, unspecified long term insulin use status  Essential hypertension  ESRD (end stage renal disease) on dialysis  No significant past surgical history      FAMILY HISTORY:      Social History: [quit 2 years: 42 yrs: 2 pk a day ] TOBACCO                  [used to drink: for many y ears  ] ETOH                                 [ x ] IVDA/DRUGS    REVIEW OF SYSTEMS      General:	x    Skin/Breast:x  	  Ophthalmologic:x  	  ENMT:	x    Respiratory and Thorax: alfaro  	  Cardiovascular:	x    Gastrointestinal:	 abdominal distension    Genitourinary:	x    Musculoskeletal:	x    Neurological:	x    Psychiatric:x	  x  Hematology/Lymphatics:	    Endocrine:	x    Allergic/Immunologic:	x    MEDICATIONS  (STANDING):  atorvastatin 80 milliGRAM(s) Oral at bedtime  chlorhexidine 4% Liquid 1 Application(s) Topical two times a day  epoetin shakira Injectable 35625 Unit(s) IV Push <User Schedule>  famotidine    Tablet 20 milliGRAM(s) Oral daily  furosemide    Tablet 40 milliGRAM(s) Oral two times a day  isosorbide   mononitrate ER Tablet (IMDUR) 120 milliGRAM(s) Oral daily  levETIRAcetam 500 milliGRAM(s) Oral daily  lisinopril 20 milliGRAM(s) Oral daily  midodrine 5 milliGRAM(s) Oral <User Schedule>    MEDICATIONS  (PRN):       Vital Signs Last 24 Hrs  T(C): 36.6 (02 Dec 2018 12:58), Max: 36.6 (02 Dec 2018 12:58)  T(F): 97.9 (02 Dec 2018 12:58), Max: 97.9 (02 Dec 2018 12:58)  HR: 74 (02 Dec 2018 12:58) (70 - 82)  BP: 84/52 (02 Dec 2018 12:58) (84/52 - 118/68)  BP(mean): --  RR: 17 (02 Dec 2018 12:58) (16 - 17)  SpO2: 99% (02 Dec 2018 12:58) (98% - 100%)        I&O's Summary    01 Dec 2018 07:01  -  02 Dec 2018 07:00  --------------------------------------------------------  IN: 500 mL / OUT: 1662 mL / NET: -1162 mL        Physical Exam:   GENERAL: NAD, well-groomed, well-developed  HEENT: KAREN/   Atraumatic, Normocephalic  ENMT: No tonsillar erythema, exudates, or enlargement; Moist mucous membranes, Good dentition, No lesions  NECK: Supple, No JVD, Normal thyroid  CHEST/LUNG: DECREASED AIR ENTRY BILATERALLY   CVS: Regular rate and rhythm; No murmurs, rubs, or gallops  GI: : Soft, Nontender, Nondistended; Bowel sounds present  NERVOUS SYSTEM:  Alert & Oriented X1  EXTREMITIES:  2+ Peripheral Pulses, No clubbing, cyanosis, or edema  LYMPH: No lymphadenopathy noted  SKIN: No rashes or lesions  ENDOCRINOLOGY: No Thyromegaly  PSYCH: Confused    Labs:  5.0<55<4>>19<<7.365>>5.0<<3><<4><<5<<199>>                            10.5   8.22  )-----------( 137      ( 02 Dec 2018 05:45 )             34.0                         10.1   8.17  )-----------( 134      ( 01 Dec 2018 06:15 )             30.6                         10.5   9.10  )-----------( 143      ( 30 Nov 2018 06:30 )             33.2                         10.1   10.58 )-----------( 134      ( 29 Nov 2018 06:00 )             32.9     12-02    141  |  98  |  15  ----------------------------<  68<L>  3.7   |  27  |  4.59<H>  12-01    137  |  96<L>  |  23  ----------------------------<  86  3.4<L>   |  26  |  6.01<H>  11-30    138  |  97<L>  |  19  ----------------------------<  84  3.7   |  25  |  4.89<H>  11-29    140  |  98  |  27<H>  ----------------------------<  51<L>  4.3   |  21<L>  |  5.93<H>    Ca    9.5      02 Dec 2018 05:45  Ca    9.1      01 Dec 2018 06:15  Phos  3.1     12-02  Phos  3.8     12-01  Mg     2.3     12-02  Mg     2.2     12-01    TPro  6.8  /  Alb  3.2<L>  /  TBili  0.9  /  DBili  x   /  AST  34  /  ALT  18  /  AlkPhos  223<H>  12-02  TPro  6.3  /  Alb  2.8<L>  /  TBili  0.7  /  DBili  x   /  AST  29  /  ALT  13  /  AlkPhos  208<H>  12-01  TPro  6.6  /  Alb  3.0<L>  /  TBili  0.7  /  DBili  x   /  AST  30  /  ALT  13  /  AlkPhos  235<H>  11-30    CAPILLARY BLOOD GLUCOSE      POCT Blood Glucose.: 85 mg/dL (01 Dec 2018 22:02)  POCT Blood Glucose.: 106 mg/< from: CT Chest No Cont (12.02.18 @ 09:40) >  CHEST:     LUNGS AND LARGE AIRWAYS: Debris is noted within the trachea and right   mainstem bronchus.  3 mm left upper lobe noncalcified pulmonary nodule.   (Series 2, image 30). 2 mm nodule within the right upper lobe (series 2,   image 27). 3 mm groundglass opacity noted within the left lower lobe   (series 2, image 71). Partial compressive atelectasis of the right lower   lobe.  PLEURA: Moderate right pleural effusion is partially loculated.  VESSELS: Atherosclerotic calcifications of the aorta.  HEART: Heart size is normal. Small pericardial effusion and/or pleural   thickening. Coronary artery calcifications. Aortic valvular   calcifications.  MEDIASTINUM AND JAIME: No lymphadenopathy.  CHEST WALL AND LOWER NECK: Within normal limits.  VISUALIZED UPPER ABDOMEN: Large visualized volume of ascites. Partially   visualized bilaterally atrophic kidneys.  BONES: Degenerative changes of the spine.    IMPRESSION:     Partially loculated moderate-sized right pleural effusion, small   pericardial effusion and/or pleural thickening, and partially visualized   large volume of ascites. Constrictive pericarditis is a consideration.   Echo recommended for complete evaluation.    Small lung nodules measure up to 3 mm. One-year follow-up CT recommended   if there are risk factors for malignancy.                      DAISY ALVAREZ M.D., RADIOLOGY RESIDENT  This document has been electronically signed.    < end of copied text >  dL (01 Dec 2018 16:48)    LIVER FUNCTIONS - ( 02 Dec 2018 05:45 )  Alb: 3.2 g/dL / Pro: 6.8 g/dL / ALK PHOS: 223 u/L / ALT: 18 u/L / AST: 34 u/L / GGT: x               D DImer      Studies  Chest X-RAY  CT SCAN Chest   CT Abdomen  Venous Dopplers: LE:   Others

## 2018-12-02 NOTE — PROGRESS NOTE ADULT - PROBLEM SELECTOR PLAN 1
Ascites noted, reason could be volume or cirrhosis. GI is following. CT Chest reveals pleural effusion , loculated. Pulm called. Need to r/o constrictive pericarditis as well, SARAY scheduled for tomorrow.

## 2018-12-02 NOTE — CONSULT NOTE ADULT - PROBLEM SELECTOR RECOMMENDATION 9
Has moderate to large effusion on the right side: He never h ad tuberculosis: check h is QuantiFeron and this is likely due to his cardio-renal disease: He has ESRD as well as may have constrictive pericarditis: May need tap on the right side: He is not SOB as well as lying supine with out any oxygenation issues:

## 2018-12-02 NOTE — PROGRESS NOTE ADULT - PROBLEM SELECTOR PLAN 2
- CT Head noting subtle mass effect in the left temporal lobe may represent underlying mass or resolving subacute hematoma  - eeg done   - awaiting MRI

## 2018-12-02 NOTE — CONSULT NOTE ADULT - ASSESSMENT
69 yo M hx of ESRD on HD ( T [facility]) DM HTN here for abdominal pain and distension during dialysis today. pt was at dialysis, began to feels generalized abdominal discomfort and was sent to ED. Reports over the past 2 weeks he had been to [facility] and dx with PNA and tx with abx and [facility] for abdominal pain/distension and was told everything is OK and d/c home. Reports that today at dialysis the pain started again so came to ED. Able to pass flatus. Denies cp sob weakness. CT abdomen reveals moderate ascites. Labs indicate mildly elevated Lipase. (27 Nov 2018 19:20)   pt can not provide noy hisotry at this time time as he seems very confused:     spoke to his wife: per her he does have SOB on exertion: He has no underlying pulmonary disease: hE WAS TREATED FOR [facility] AND AFTER HE WAS DCED HE WAS ALWAYS CONFUSED : aND WHEN HE WENT FOR DILAYSIS his belly was distended and he was SOB   he did have pneumonia but never had tuberculosis

## 2018-12-02 NOTE — PROGRESS NOTE ADULT - PROBLEM SELECTOR PLAN 1
- TTE notable for EF 80-85% and mild LVOT obstruction   - s/p diagnostic tap 11/29 reviewed with [facility]

## 2018-12-02 NOTE — CHART NOTE - NSCHARTNOTEFT_GEN_A_CORE
Called by RN to evaluate pt for hypotension and bradycardic to 40's. Manual BP 69/48, HR 40, SpO2 99% on room air. Upon arrival pt laying in bed is awake and responsive but confused. Pt denies chest pain, SOB, lightheadedness. BP responded to 250cc bolus x1 now 102/62, HR 64, pt asymptomatic at this time discussed with Dr. Desouza and Dr. Britton, will hold BP meds for now and continue to monitor closely. Low threshold for ICU c/s.

## 2018-12-03 LAB
ALBUMIN SERPL ELPH-MCNC: 3 G/DL — LOW (ref 3.3–5)
ALP SERPL-CCNC: 203 U/L — HIGH (ref 40–120)
ALT FLD-CCNC: 18 U/L — SIGNIFICANT CHANGE UP (ref 4–41)
AST SERPL-CCNC: 32 U/L — SIGNIFICANT CHANGE UP (ref 4–40)
BILIRUB SERPL-MCNC: 0.7 MG/DL — SIGNIFICANT CHANGE UP (ref 0.2–1.2)
BUN SERPL-MCNC: 17 MG/DL — SIGNIFICANT CHANGE UP (ref 7–23)
CALCIUM SERPL-MCNC: 9.4 MG/DL — SIGNIFICANT CHANGE UP (ref 8.4–10.5)
CHLORIDE SERPL-SCNC: 99 MMOL/L — SIGNIFICANT CHANGE UP (ref 98–107)
CO2 SERPL-SCNC: 25 MMOL/L — SIGNIFICANT CHANGE UP (ref 22–31)
CREAT SERPL-MCNC: 5.54 MG/DL — HIGH (ref 0.5–1.3)
GLUCOSE SERPL-MCNC: 78 MG/DL — SIGNIFICANT CHANGE UP (ref 70–99)
HCT VFR BLD CALC: 34.5 % — LOW (ref 39–50)
HGB BLD-MCNC: 10.7 G/DL — LOW (ref 13–17)
MAGNESIUM SERPL-MCNC: 2.2 MG/DL — SIGNIFICANT CHANGE UP (ref 1.6–2.6)
MCHC RBC-ENTMCNC: 30.1 PG — SIGNIFICANT CHANGE UP (ref 27–34)
MCHC RBC-ENTMCNC: 31 % — LOW (ref 32–36)
MCV RBC AUTO: 96.9 FL — SIGNIFICANT CHANGE UP (ref 80–100)
NRBC # FLD: 0 — SIGNIFICANT CHANGE UP
PHOSPHATE SERPL-MCNC: 3.4 MG/DL — SIGNIFICANT CHANGE UP (ref 2.5–4.5)
PLATELET # BLD AUTO: 142 K/UL — LOW (ref 150–400)
PMV BLD: 11.6 FL — SIGNIFICANT CHANGE UP (ref 7–13)
POTASSIUM SERPL-MCNC: 4 MMOL/L — SIGNIFICANT CHANGE UP (ref 3.5–5.3)
POTASSIUM SERPL-SCNC: 4 MMOL/L — SIGNIFICANT CHANGE UP (ref 3.5–5.3)
PROT SERPL-MCNC: 6.4 G/DL — SIGNIFICANT CHANGE UP (ref 6–8.3)
RBC # BLD: 3.56 M/UL — LOW (ref 4.2–5.8)
RBC # FLD: 17.8 % — HIGH (ref 10.3–14.5)
SODIUM SERPL-SCNC: 140 MMOL/L — SIGNIFICANT CHANGE UP (ref 135–145)
WBC # BLD: 8.09 K/UL — SIGNIFICANT CHANGE UP (ref 3.8–10.5)
WBC # FLD AUTO: 8.09 K/UL — SIGNIFICANT CHANGE UP (ref 3.8–10.5)

## 2018-12-03 PROCEDURE — 49083 ABD PARACENTESIS W/IMAGING: CPT | Mod: GC

## 2018-12-03 RX ORDER — ALBUMIN HUMAN 25 %
42 VIAL (ML) INTRAVENOUS ONCE
Qty: 0 | Refills: 0 | Status: COMPLETED | OUTPATIENT
Start: 2018-12-03 | End: 2018-12-03

## 2018-12-03 RX ORDER — LIDOCAINE HCL 20 MG/ML
20 VIAL (ML) INJECTION ONCE
Qty: 0 | Refills: 0 | Status: DISCONTINUED | OUTPATIENT
Start: 2018-12-03 | End: 2018-12-07

## 2018-12-03 RX ADMIN — LEVETIRACETAM 500 MILLIGRAM(S): 250 TABLET, FILM COATED ORAL at 12:17

## 2018-12-03 RX ADMIN — CHLORHEXIDINE GLUCONATE 1 APPLICATION(S): 213 SOLUTION TOPICAL at 18:49

## 2018-12-03 RX ADMIN — Medication 168 GRAM(S): at 16:25

## 2018-12-03 RX ADMIN — ATORVASTATIN CALCIUM 80 MILLIGRAM(S): 80 TABLET, FILM COATED ORAL at 22:17

## 2018-12-03 RX ADMIN — FAMOTIDINE 20 MILLIGRAM(S): 10 INJECTION INTRAVENOUS at 12:17

## 2018-12-03 RX ADMIN — CHLORHEXIDINE GLUCONATE 1 APPLICATION(S): 213 SOLUTION TOPICAL at 06:04

## 2018-12-03 NOTE — PROGRESS NOTE ADULT - PROBLEM SELECTOR PLAN 2
- CT Head noting subtle mass effect in the left temporal lobe may represent underlying mass or resolving subacute hematoma

## 2018-12-03 NOTE — CHART NOTE - NSCHARTNOTEFT_GEN_A_CORE
was called by RN Pt is sitting on a floor. PT evaluated, in no distress and HD stable with /60 HR 73 TELE with no events RR 17 100% on RA  On exam Pt atraumatic with Full ROM no changes in skin integrity. Lungs with basilar crackles, CV S1S2 Present Abdomen benign.   Unclear mechanism was called by RN Pt is sitting on a floor. PT evaluated, in no distress and HD stable with /60 HR 73 TELE with no events RR 17 100% on RA  On exam Pt atraumatic with Full ROM no changes in skin integrity. Lungs with basilar crackles, CV S1S2 Present Abdomen benign.   I asked Pt why he was sitting on a floor, he responded that he wonted to get his shoes. No need for further interventions will observe bed alarm activated

## 2018-12-03 NOTE — PROGRESS NOTE ADULT - PROBLEM SELECTOR PLAN 1
ESRD on HD ( TTS) via AVF  Last HD on 12/1 with 1.1 L UF , given midodrine   Plan for HD tomorrow  Monitor BMP and BP  Renal diet

## 2018-12-03 NOTE — PROCEDURE NOTE - NSPROCDETAILS_GEN_ALL_CORE
Images in chart/ultrasound utilization/location identified, sterile technique used, catheter introduced, fluid drained

## 2018-12-03 NOTE — PROCEDURE NOTE - NSPOSTCAREGUIDE_GEN_A_CORE
Verbal/written post procedure instructions were given to patient/caregiver
Verbal/written post procedure instructions were given to patient/caregiver/Instructed patient/caregiver to follow-up with primary care physician/Keep the cast/splint/dressing clean and dry

## 2018-12-03 NOTE — PROCEDURE NOTE - ADDITIONAL PROCEDURE DETAILS
Invasive procedure team was consulted for diagnostic/therapeutic paracentesis due to abdominal discomfort. Explained risks (including bleeding, infection, and bowel perforation) and benefits to patient and patient expressed understanding and consented. Ultrasound was utilized and visualized 7cm Ascitic fluid pocket identified w/ no epigastric vessels visualized. No rash or vessels overlying skin site. Pts plts 142, INR 1.02, DVT PPx held over night, not on NOACs or coumadin. Sterile technique was used and 10mL of 1% lidocaine was used for local anesthesia. Small incision with scalpel done and 18 Fr Arrow Paracentesis catheter used. ____L of clear yellow ascitic fluid drained. Catheter removed and dressed. Pt tolerated procedure well and no complications. Lab Analysis sent. Communicated to primary team. Invasive procedure team was consulted for diagnostic/therapeutic paracentesis due to abdominal discomfort. Explained risks (including bleeding, infection, and bowel perforation) and benefits to patient and patient expressed understanding and consented. Ultrasound was utilized and visualized 7cm Ascitic fluid pocket identified w/ no epigastric vessels visualized. No rash or vessels overlying skin site. Pts plts 142, INR 1.02, DVT PPx held over night, not on NOACs or coumadin. Sterile technique was used and 10mL of 1% lidocaine was used for local anesthesia. Small incision with scalpel done and 18 Fr Arrow Paracentesis catheter used. 7L of clear yellow ascitic fluid drained. Catheter removed and dressed. Pt tolerated procedure well and no complications. Lab Analysis sent. Communicated to primary team.

## 2018-12-04 LAB
BASOPHILS # BLD AUTO: 0.06 K/UL — SIGNIFICANT CHANGE UP (ref 0–0.2)
BASOPHILS NFR BLD AUTO: 0.6 % — SIGNIFICANT CHANGE UP (ref 0–2)
BUN SERPL-MCNC: 22 MG/DL — SIGNIFICANT CHANGE UP (ref 7–23)
CALCIUM SERPL-MCNC: 9 MG/DL — SIGNIFICANT CHANGE UP (ref 8.4–10.5)
CHLORIDE SERPL-SCNC: 101 MMOL/L — SIGNIFICANT CHANGE UP (ref 98–107)
CO2 SERPL-SCNC: 25 MMOL/L — SIGNIFICANT CHANGE UP (ref 22–31)
CREAT SERPL-MCNC: 6.1 MG/DL — HIGH (ref 0.5–1.3)
EOSINOPHIL # BLD AUTO: 3.15 K/UL — HIGH (ref 0–0.5)
EOSINOPHIL NFR BLD AUTO: 34 % — HIGH (ref 0–6)
GLUCOSE SERPL-MCNC: 93 MG/DL — SIGNIFICANT CHANGE UP (ref 70–99)
HCT VFR BLD CALC: 35.5 % — LOW (ref 39–50)
HGB BLD-MCNC: 11.2 G/DL — LOW (ref 13–17)
IMM GRANULOCYTES # BLD AUTO: 0.03 # — SIGNIFICANT CHANGE UP
IMM GRANULOCYTES NFR BLD AUTO: 0.3 % — SIGNIFICANT CHANGE UP (ref 0–1.5)
LYMPHOCYTES # BLD AUTO: 1.4 K/UL — SIGNIFICANT CHANGE UP (ref 1–3.3)
LYMPHOCYTES # BLD AUTO: 15.1 % — SIGNIFICANT CHANGE UP (ref 13–44)
MAGNESIUM SERPL-MCNC: 2.1 MG/DL — SIGNIFICANT CHANGE UP (ref 1.6–2.6)
MCHC RBC-ENTMCNC: 29.7 PG — SIGNIFICANT CHANGE UP (ref 27–34)
MCHC RBC-ENTMCNC: 31.5 % — LOW (ref 32–36)
MCV RBC AUTO: 94.2 FL — SIGNIFICANT CHANGE UP (ref 80–100)
MONOCYTES # BLD AUTO: 0.4 K/UL — SIGNIFICANT CHANGE UP (ref 0–0.9)
MONOCYTES NFR BLD AUTO: 4.3 % — SIGNIFICANT CHANGE UP (ref 2–14)
NEUTROPHILS # BLD AUTO: 4.22 K/UL — SIGNIFICANT CHANGE UP (ref 1.8–7.4)
NEUTROPHILS NFR BLD AUTO: 45.7 % — SIGNIFICANT CHANGE UP (ref 43–77)
NRBC # FLD: 0 — SIGNIFICANT CHANGE UP
PLATELET # BLD AUTO: 127 K/UL — LOW (ref 150–400)
PMV BLD: 11.2 FL — SIGNIFICANT CHANGE UP (ref 7–13)
POTASSIUM SERPL-MCNC: 3.6 MMOL/L — SIGNIFICANT CHANGE UP (ref 3.5–5.3)
POTASSIUM SERPL-SCNC: 3.6 MMOL/L — SIGNIFICANT CHANGE UP (ref 3.5–5.3)
RBC # BLD: 3.77 M/UL — LOW (ref 4.2–5.8)
RBC # FLD: 17.2 % — HIGH (ref 10.3–14.5)
SODIUM SERPL-SCNC: 141 MMOL/L — SIGNIFICANT CHANGE UP (ref 135–145)
WBC # BLD: 9.26 K/UL — SIGNIFICANT CHANGE UP (ref 3.8–10.5)
WBC # FLD AUTO: 9.26 K/UL — SIGNIFICANT CHANGE UP (ref 3.8–10.5)

## 2018-12-04 PROCEDURE — 70551 MRI BRAIN STEM W/O DYE: CPT | Mod: 26

## 2018-12-04 RX ORDER — MIDODRINE HYDROCHLORIDE 2.5 MG/1
5 TABLET ORAL ONCE
Qty: 0 | Refills: 0 | Status: COMPLETED | OUTPATIENT
Start: 2018-12-04 | End: 2018-12-04

## 2018-12-04 RX ORDER — CLOPIDOGREL BISULFATE 75 MG/1
75 TABLET, FILM COATED ORAL DAILY
Qty: 0 | Refills: 0 | Status: DISCONTINUED | OUTPATIENT
Start: 2018-12-04 | End: 2018-12-04

## 2018-12-04 RX ADMIN — CLOPIDOGREL BISULFATE 75 MILLIGRAM(S): 75 TABLET, FILM COATED ORAL at 11:39

## 2018-12-04 RX ADMIN — LEVETIRACETAM 500 MILLIGRAM(S): 250 TABLET, FILM COATED ORAL at 11:39

## 2018-12-04 RX ADMIN — CHLORHEXIDINE GLUCONATE 1 APPLICATION(S): 213 SOLUTION TOPICAL at 06:49

## 2018-12-04 RX ADMIN — MIDODRINE HYDROCHLORIDE 5 MILLIGRAM(S): 2.5 TABLET ORAL at 11:39

## 2018-12-04 RX ADMIN — CHLORHEXIDINE GLUCONATE 1 APPLICATION(S): 213 SOLUTION TOPICAL at 17:13

## 2018-12-04 RX ADMIN — FAMOTIDINE 20 MILLIGRAM(S): 10 INJECTION INTRAVENOUS at 11:39

## 2018-12-04 RX ADMIN — ERYTHROPOIETIN 10000 UNIT(S): 10000 INJECTION, SOLUTION INTRAVENOUS; SUBCUTANEOUS at 22:05

## 2018-12-04 RX ADMIN — MIDODRINE HYDROCHLORIDE 5 MILLIGRAM(S): 2.5 TABLET ORAL at 21:49

## 2018-12-04 NOTE — PROGRESS NOTE ADULT - PROBLEM SELECTOR PLAN 1
ESRD on HD ( TTS) via AVF  Last HD on 12/1 with 1.1 L UF , given midodrine   Plan for HD today, UF as tolerated  Monitor BMP and BP  Renal diet

## 2018-12-04 NOTE — CHART NOTE - NSCHARTNOTEFT_GEN_A_CORE
< from: MR Head No Cont (12.04.18 @ 15:22) >    Impression: Parenchymal and subarachnoid hemorrhage suspected involving   the left temporal region. Clinical correlation and continued close   interval follow-up until resolution is recommended.      Images were reviewed with neuroradiology and look to be subacute blood products.  Given location of the hemorrhage and both intraparenchymal and subarachnoid components I favor traumatic cause.  The patient has not exhibited any elevated to BP while in-house.        Left Temporal Intracranial Hemorrhage  - Continue off anti-platelets at that this time  - BP control, patient has not had any elevations during hospitalization  - Pt will need to have repeat MRI Brain in 6 to 8 weeks to assess for underlying mass.  -C/W Keppra     There are no contraindications to VATs procedure.

## 2018-12-04 NOTE — CHART NOTE - NSCHARTNOTEFT_GEN_A_CORE
Pt s/p MRI with noted parenchymal and subarachnoid hemorrhage neuro recommended to dc antiplatlet. discussed with Dr Lyly echols to DC plavix, recommended to DC SARAY as well given AMS new finding of brain hemorrhage. Per pulmonary request spoke with IR for right loculated pleural effusion per IR effusion is small they don't recommend tapping. They did commended on large ascities that pt would need paracentesis by procedure team but pt just had the paracentesis yesterday 12/3. Dr Herrera and Dr Britton made aware of conversation

## 2018-12-05 DIAGNOSIS — I61.9 NONTRAUMATIC INTRACEREBRAL HEMORRHAGE, UNSPECIFIED: ICD-10-CM

## 2018-12-05 LAB
APTT BLD: 32.4 SEC — SIGNIFICANT CHANGE UP (ref 27.5–36.3)
BACTERIA BLD CULT: SIGNIFICANT CHANGE UP
BACTERIA BLD CULT: SIGNIFICANT CHANGE UP
BACTERIA FLD CULT: SIGNIFICANT CHANGE UP
BASOPHILS # BLD AUTO: 0.07 K/UL — SIGNIFICANT CHANGE UP (ref 0–0.2)
BASOPHILS NFR BLD AUTO: 0.7 % — SIGNIFICANT CHANGE UP (ref 0–2)
BUN SERPL-MCNC: 14 MG/DL — SIGNIFICANT CHANGE UP (ref 7–23)
CALCIUM SERPL-MCNC: 9.2 MG/DL — SIGNIFICANT CHANGE UP (ref 8.4–10.5)
CHLORIDE SERPL-SCNC: 98 MMOL/L — SIGNIFICANT CHANGE UP (ref 98–107)
CO2 SERPL-SCNC: 28 MMOL/L — SIGNIFICANT CHANGE UP (ref 22–31)
CREAT SERPL-MCNC: 4.71 MG/DL — HIGH (ref 0.5–1.3)
EOSINOPHIL # BLD AUTO: 3.42 K/UL — HIGH (ref 0–0.5)
EOSINOPHIL NFR BLD AUTO: 35.9 % — HIGH (ref 0–6)
GLUCOSE SERPL-MCNC: 61 MG/DL — LOW (ref 70–99)
HCT VFR BLD CALC: 38.5 % — LOW (ref 39–50)
HGB BLD-MCNC: 12.1 G/DL — LOW (ref 13–17)
IMM GRANULOCYTES # BLD AUTO: 0.02 # — SIGNIFICANT CHANGE UP
IMM GRANULOCYTES NFR BLD AUTO: 0.2 % — SIGNIFICANT CHANGE UP (ref 0–1.5)
INR BLD: 1.13 — SIGNIFICANT CHANGE UP (ref 0.88–1.17)
LYMPHOCYTES # BLD AUTO: 1.37 K/UL — SIGNIFICANT CHANGE UP (ref 1–3.3)
LYMPHOCYTES # BLD AUTO: 14.4 % — SIGNIFICANT CHANGE UP (ref 13–44)
MAGNESIUM SERPL-MCNC: 2 MG/DL — SIGNIFICANT CHANGE UP (ref 1.6–2.6)
MCHC RBC-ENTMCNC: 29.5 PG — SIGNIFICANT CHANGE UP (ref 27–34)
MCHC RBC-ENTMCNC: 31.4 % — LOW (ref 32–36)
MCV RBC AUTO: 93.9 FL — SIGNIFICANT CHANGE UP (ref 80–100)
MONOCYTES # BLD AUTO: 0.62 K/UL — SIGNIFICANT CHANGE UP (ref 0–0.9)
MONOCYTES NFR BLD AUTO: 6.5 % — SIGNIFICANT CHANGE UP (ref 2–14)
NEUTROPHILS # BLD AUTO: 4.03 K/UL — SIGNIFICANT CHANGE UP (ref 1.8–7.4)
NEUTROPHILS NFR BLD AUTO: 42.3 % — LOW (ref 43–77)
NRBC # FLD: 0 — SIGNIFICANT CHANGE UP
PLATELET # BLD AUTO: 141 K/UL — LOW (ref 150–400)
PMV BLD: 11.6 FL — SIGNIFICANT CHANGE UP (ref 7–13)
POTASSIUM SERPL-MCNC: 3.6 MMOL/L — SIGNIFICANT CHANGE UP (ref 3.5–5.3)
POTASSIUM SERPL-SCNC: 3.6 MMOL/L — SIGNIFICANT CHANGE UP (ref 3.5–5.3)
PROTHROM AB SERPL-ACNC: 12.9 SEC — SIGNIFICANT CHANGE UP (ref 9.8–13.1)
RBC # BLD: 4.1 M/UL — LOW (ref 4.2–5.8)
RBC # FLD: 17.6 % — HIGH (ref 10.3–14.5)
SODIUM SERPL-SCNC: 141 MMOL/L — SIGNIFICANT CHANGE UP (ref 135–145)
WBC # BLD: 9.53 K/UL — SIGNIFICANT CHANGE UP (ref 3.8–10.5)
WBC # FLD AUTO: 9.53 K/UL — SIGNIFICANT CHANGE UP (ref 3.8–10.5)

## 2018-12-05 RX ADMIN — LEVETIRACETAM 500 MILLIGRAM(S): 250 TABLET, FILM COATED ORAL at 11:34

## 2018-12-05 RX ADMIN — ATORVASTATIN CALCIUM 80 MILLIGRAM(S): 80 TABLET, FILM COATED ORAL at 22:03

## 2018-12-05 RX ADMIN — FAMOTIDINE 20 MILLIGRAM(S): 10 INJECTION INTRAVENOUS at 11:34

## 2018-12-05 RX ADMIN — ATORVASTATIN CALCIUM 80 MILLIGRAM(S): 80 TABLET, FILM COATED ORAL at 00:34

## 2018-12-05 RX ADMIN — CHLORHEXIDINE GLUCONATE 1 APPLICATION(S): 213 SOLUTION TOPICAL at 06:22

## 2018-12-05 RX ADMIN — CHLORHEXIDINE GLUCONATE 1 APPLICATION(S): 213 SOLUTION TOPICAL at 17:09

## 2018-12-05 NOTE — PROGRESS NOTE ADULT - PROBLEM SELECTOR PLAN 1
CTS called: he has partially loculated effusio on the rigth side  12/4: DW CTS PA: To see today  12/5: Pt isnot SOB as wellas not on any oxygen: The wife is refusing for any invasive procedures anyway: For now observe:

## 2018-12-05 NOTE — PHYSICAL THERAPY INITIAL EVALUATION ADULT - PATIENT PROFILE REVIEW, REHAB EVAL
yes/As per MEHDI Lee, pt is okay to be seen for PT evaluation. Activity order: Ambulate as tolerated

## 2018-12-05 NOTE — PHYSICAL THERAPY INITIAL EVALUATION ADULT - STANDING BALANCE: DYNAMIC, REHAB EVAL
VSS.  Circumcision performed by Dr. Gregoria Arce. 1.2 plastibell used. Site WNL. Will monitor for bleeding. 2035 - No active bleeding. Scant areas of blood in diaper.   Will continue to monitor, poor plus

## 2018-12-05 NOTE — PHYSICAL THERAPY INITIAL EVALUATION ADULT - ADDITIONAL COMMENTS
As per prior Social work assessment pt ambulates with cane, walker, uses wheelchair for outdoors.   Unknown if pt requires assist of person.

## 2018-12-05 NOTE — PHYSICAL THERAPY INITIAL EVALUATION ADULT - PERTINENT HX OF CURRENT PROBLEM, REHAB EVAL
67 yo Male with hx of ESRD on HD ( T W TH) DM HTN here for abdominal pain and distension during dialysis. Pt s/p paracentesis 11/29, 12/3. Pt also found with SAH involving Left temporal region, and Right pleural effusion.

## 2018-12-05 NOTE — PROGRESS NOTE ADULT - PROBLEM SELECTOR PLAN 2
MRI shows subarachnoid bleed. this probably along with seizures caused the encephalopathy, which is improving now.

## 2018-12-05 NOTE — PROGRESS NOTE ADULT - PROBLEM SELECTOR PLAN 1
ESRD on HD ( TTS) via AVF  Last HD on 12/4 with 1.1 L UF , given midodrine   Plan for HD tmr  Monitor BMP and BP  Renal diet ESRD on HD ( TTS) via AVF  Last HD on 12/4 with 1.1 L UF , given midodrine   Plan for HD tmr  Monitor BMP and BP  Renal diet  wife at bedside, all questions answered

## 2018-12-06 DIAGNOSIS — I69.398 OTHER SEQUELAE OF CEREBRAL INFARCTION: ICD-10-CM

## 2018-12-06 DIAGNOSIS — S06.350A TRAUMATIC HEMORRHAGE OF LEFT CEREBRUM WITHOUT LOSS OF CONSCIOUSNESS, INITIAL ENCOUNTER: ICD-10-CM

## 2018-12-06 LAB
BASOPHILS # BLD AUTO: 0.06 K/UL — SIGNIFICANT CHANGE UP (ref 0–0.2)
BASOPHILS NFR BLD AUTO: 0.6 % — SIGNIFICANT CHANGE UP (ref 0–2)
BUN SERPL-MCNC: 20 MG/DL — SIGNIFICANT CHANGE UP (ref 7–23)
CALCIUM SERPL-MCNC: 9.2 MG/DL — SIGNIFICANT CHANGE UP (ref 8.4–10.5)
CHLORIDE SERPL-SCNC: 99 MMOL/L — SIGNIFICANT CHANGE UP (ref 98–107)
CO2 SERPL-SCNC: 29 MMOL/L — SIGNIFICANT CHANGE UP (ref 22–31)
CREAT SERPL-MCNC: 5.94 MG/DL — HIGH (ref 0.5–1.3)
EOSINOPHIL # BLD AUTO: 3.29 K/UL — HIGH (ref 0–0.5)
EOSINOPHIL NFR BLD AUTO: 35.3 % — HIGH (ref 0–6)
GLUCOSE SERPL-MCNC: 93 MG/DL — SIGNIFICANT CHANGE UP (ref 70–99)
HCT VFR BLD CALC: 35.7 % — LOW (ref 39–50)
HGB BLD-MCNC: 11.2 G/DL — LOW (ref 13–17)
IMM GRANULOCYTES # BLD AUTO: 0.02 # — SIGNIFICANT CHANGE UP
IMM GRANULOCYTES NFR BLD AUTO: 0.2 % — SIGNIFICANT CHANGE UP (ref 0–1.5)
LYMPHOCYTES # BLD AUTO: 1.88 K/UL — SIGNIFICANT CHANGE UP (ref 1–3.3)
LYMPHOCYTES # BLD AUTO: 20.2 % — SIGNIFICANT CHANGE UP (ref 13–44)
MAGNESIUM SERPL-MCNC: 2.2 MG/DL — SIGNIFICANT CHANGE UP (ref 1.6–2.6)
MCHC RBC-ENTMCNC: 29.3 PG — SIGNIFICANT CHANGE UP (ref 27–34)
MCHC RBC-ENTMCNC: 31.4 % — LOW (ref 32–36)
MCV RBC AUTO: 93.5 FL — SIGNIFICANT CHANGE UP (ref 80–100)
MONOCYTES # BLD AUTO: 0.98 K/UL — HIGH (ref 0–0.9)
MONOCYTES NFR BLD AUTO: 10.5 % — SIGNIFICANT CHANGE UP (ref 2–14)
NEUTROPHILS # BLD AUTO: 3.08 K/UL — SIGNIFICANT CHANGE UP (ref 1.8–7.4)
NEUTROPHILS NFR BLD AUTO: 33.2 % — LOW (ref 43–77)
NRBC # FLD: 0.02 — SIGNIFICANT CHANGE UP
PLATELET # BLD AUTO: 136 K/UL — LOW (ref 150–400)
PMV BLD: 11.3 FL — SIGNIFICANT CHANGE UP (ref 7–13)
POTASSIUM SERPL-MCNC: 3.6 MMOL/L — SIGNIFICANT CHANGE UP (ref 3.5–5.3)
POTASSIUM SERPL-SCNC: 3.6 MMOL/L — SIGNIFICANT CHANGE UP (ref 3.5–5.3)
RBC # BLD: 3.82 M/UL — LOW (ref 4.2–5.8)
RBC # FLD: 17.5 % — HIGH (ref 10.3–14.5)
SODIUM SERPL-SCNC: 142 MMOL/L — SIGNIFICANT CHANGE UP (ref 135–145)
WBC # BLD: 9.31 K/UL — SIGNIFICANT CHANGE UP (ref 3.8–10.5)
WBC # FLD AUTO: 9.31 K/UL — SIGNIFICANT CHANGE UP (ref 3.8–10.5)

## 2018-12-06 RX ORDER — ASPIRIN/CALCIUM CARB/MAGNESIUM 324 MG
1 TABLET ORAL
Qty: 0 | Refills: 0 | COMMUNITY

## 2018-12-06 RX ORDER — LEVETIRACETAM 250 MG/1
1 TABLET, FILM COATED ORAL
Qty: 0 | Refills: 0 | COMMUNITY
Start: 2018-12-06

## 2018-12-06 RX ADMIN — CHLORHEXIDINE GLUCONATE 1 APPLICATION(S): 213 SOLUTION TOPICAL at 17:21

## 2018-12-06 RX ADMIN — ATORVASTATIN CALCIUM 80 MILLIGRAM(S): 80 TABLET, FILM COATED ORAL at 21:39

## 2018-12-06 RX ADMIN — CHLORHEXIDINE GLUCONATE 1 APPLICATION(S): 213 SOLUTION TOPICAL at 05:08

## 2018-12-06 RX ADMIN — FAMOTIDINE 20 MILLIGRAM(S): 10 INJECTION INTRAVENOUS at 11:30

## 2018-12-06 RX ADMIN — LEVETIRACETAM 500 MILLIGRAM(S): 250 TABLET, FILM COATED ORAL at 11:30

## 2018-12-06 RX ADMIN — MIDODRINE HYDROCHLORIDE 5 MILLIGRAM(S): 2.5 TABLET ORAL at 23:08

## 2018-12-06 NOTE — PROGRESS NOTE ADULT - PROBLEM SELECTOR PLAN 2
Has temporal as well as Subarachnoid hemorrhage: Defer to primary team and neurosurgery/ Neurology  1206: neurology follow up

## 2018-12-06 NOTE — PROGRESS NOTE ADULT - PROBLEM SELECTOR PLAN 1
ESRD on HD ( TTS) via AVF  Last HD on 12/4 with 1.1 L UF , given midodrine   Plan for HD today  Monitor BMP and BP  Renal diet

## 2018-12-06 NOTE — DIETITIAN INITIAL EVALUATION ADULT. - PHYSICAL APPEARANCE
other (specify)/underweight/Nutrition focused physical exam conducted - found signs of malnutrition [ ] absent [X] present Subcutaneous fat loss: [moderate] Buccal fat region, [moderate] Ribs region. Muscle wasting: [moderate] Temples region, [moderate] Clavicle region, [severe] Calf region

## 2018-12-06 NOTE — PROGRESS NOTE ADULT - PROBLEM SELECTOR PLAN 1
CTS called: he has partially loculated effusio on the rigth side  12/4: DW CTS PA: To see today  12/5: Pt isnot SOB as wellas not on any oxygen: The wife is refusing for any invasive procedures anyway: For now observe:  12/6: pt is not SOB: NO wheezing: no intervnetionon IN chEst per Wife: Have also discussed with Dr BORGES:

## 2018-12-06 NOTE — DIETITIAN INITIAL EVALUATION ADULT. - DIET TYPE
renal replacement pts:no protein restr,no conc K & phos, low sodium/DASH/TLC (sodium and cholesterol restricted diet)/dysphagia 3, soft, nectar consistency fluid

## 2018-12-06 NOTE — CHART NOTE - NSCHARTNOTEFT_GEN_A_CORE
NUTRITION SERVICES     Upon Nutritional Assessment by the Registered Dietitian your patient was determined to meet criteria/ has evidence of the following diagnosis/diagnoses:  [ ] Mild Protein Calorie Malnutrition   [X] Moderate Protein Calorie Malnutrition   [ ] Severe Protein Calorie Malnutrition   [ ] Unspecified Protein Calorie Malnutrition   [ ] Underweight / BMI <19  [ ] Morbid Obesity / BMI >40    Findings as based on:  •  Comprehensive nutrition assessment and consultation   •  Calorie Counts (nutrient intake analysis)   •  Food acceptance and intake status from observations by staff  •  Follow up  •  Patient education  •  Intervention secondary to interdisciplinary rounds  •   concerns     Treatment:  The following diet has been recommended:

## 2018-12-06 NOTE — DIETITIAN INITIAL EVALUATION ADULT. - NS AS NUTRI INTERV MEALS SNACK
1. Suggest: PO diet rx: Dysphagia 3 Soft-Nectar Consistency Fluid, Renal Replacement (no prot restr, no conc K, no conc phos, low sodium); PO supplement: Nepro 1 can daily (provides additional ~425 Kcal, ~19 gm Protein);                 2. Encourage & assist Pt with meals; Monitor PO diet tolerance; Honor food preferences;                 3. Recommend: Nephro-david 1 cap daily;            4. Monitor labs, daily weights, hydration status;/Texture-modified diet

## 2018-12-06 NOTE — DIETITIAN INITIAL EVALUATION ADULT. - OTHER INFO
Pt seen for Length of stay. Pt 69 yo male with ESRD on dialysis. At time of visit Pt appears alert, but disoriented. No family @ bed side. Unable to discuss diet/weight history with Pt. Per nurse Pt's appetite not that well, Pt needs help with try set-up. No report of nausea, vomiting or diarrhea @ this time; no report of pressure injuries. Of note Pt passed Swallow Bedside Assessment Adult, SLP rec: Dysphagia 3 & Nectar Thick Consistency fluids (11/30). Per other admission record Pt's weight: 163.8# (74.3 Kg - 8/22/17); According to current admission records Pt’s dosing weight: 159.3# (72.3Kg – 11/27), Pt’s daily weight: 127.4# (57.8 Kg - 12/5/18). ??Weight loss of ~31.9# in 8 days? ?Question about accuracy of recorded weights?? Pt appears underweight, Pt with physical signs described above. Pt may benefit from PO supplement: Nepro - 1x daily. Of note Pt S/P paracentesis (12/3) 7 liters by procedure team. Case discussed with nurse. RDN remains available, nurse made aware.

## 2018-12-06 NOTE — DIETITIAN INITIAL EVALUATION ADULT. - SIGNS/SYMPTOMS
As evidenced by <75 % Kcal intake, Pt with physical signs described above Elevated BUN & Creat, dialysis Pt

## 2018-12-07 VITALS
DIASTOLIC BLOOD PRESSURE: 68 MMHG | SYSTOLIC BLOOD PRESSURE: 108 MMHG | RESPIRATION RATE: 17 BRPM | OXYGEN SATURATION: 99 % | TEMPERATURE: 98 F | HEART RATE: 64 BPM

## 2018-12-07 LAB
ANISOCYTOSIS BLD QL: SLIGHT — SIGNIFICANT CHANGE UP
BASOPHILS # BLD AUTO: 0.07 K/UL — SIGNIFICANT CHANGE UP (ref 0–0.2)
BASOPHILS NFR BLD AUTO: 1 % — SIGNIFICANT CHANGE UP (ref 0–2)
BASOPHILS NFR SPEC: 1.8 % — SIGNIFICANT CHANGE UP (ref 0–2)
BLASTS # FLD: 0 % — SIGNIFICANT CHANGE UP (ref 0–0)
BUN SERPL-MCNC: 11 MG/DL — SIGNIFICANT CHANGE UP (ref 7–23)
CALCIUM SERPL-MCNC: 8.9 MG/DL — SIGNIFICANT CHANGE UP (ref 8.4–10.5)
CHLORIDE SERPL-SCNC: 100 MMOL/L — SIGNIFICANT CHANGE UP (ref 98–107)
CO2 SERPL-SCNC: 31 MMOL/L — SIGNIFICANT CHANGE UP (ref 22–31)
CREAT SERPL-MCNC: 3.82 MG/DL — HIGH (ref 0.5–1.3)
EOSINOPHIL # BLD AUTO: 2.41 K/UL — HIGH (ref 0–0.5)
EOSINOPHIL NFR BLD AUTO: 36 % — HIGH (ref 0–6)
EOSINOPHIL NFR FLD: 37 % — HIGH (ref 0–6)
GIANT PLATELETS BLD QL SMEAR: PRESENT — SIGNIFICANT CHANGE UP
GLUCOSE SERPL-MCNC: 84 MG/DL — SIGNIFICANT CHANGE UP (ref 70–99)
HBA1C BLD-MCNC: 4.7 % — SIGNIFICANT CHANGE UP (ref 4–5.6)
HCT VFR BLD CALC: 39.7 % — SIGNIFICANT CHANGE UP (ref 39–50)
HGB BLD-MCNC: 12.4 G/DL — LOW (ref 13–17)
IMM GRANULOCYTES # BLD AUTO: 0.01 # — SIGNIFICANT CHANGE UP
IMM GRANULOCYTES NFR BLD AUTO: 0.1 % — SIGNIFICANT CHANGE UP (ref 0–1.5)
LYMPHOCYTES # BLD AUTO: 1.46 K/UL — SIGNIFICANT CHANGE UP (ref 1–3.3)
LYMPHOCYTES # BLD AUTO: 21.8 % — SIGNIFICANT CHANGE UP (ref 13–44)
LYMPHOCYTES NFR SPEC AUTO: 9 % — LOW (ref 13–44)
MACROCYTES BLD QL: SIGNIFICANT CHANGE UP
MAGNESIUM SERPL-MCNC: 2 MG/DL — SIGNIFICANT CHANGE UP (ref 1.6–2.6)
MCHC RBC-ENTMCNC: 29.6 PG — SIGNIFICANT CHANGE UP (ref 27–34)
MCHC RBC-ENTMCNC: 31.2 % — LOW (ref 32–36)
MCV RBC AUTO: 94.7 FL — SIGNIFICANT CHANGE UP (ref 80–100)
METAMYELOCYTES # FLD: 0 % — SIGNIFICANT CHANGE UP (ref 0–1)
MONOCYTES # BLD AUTO: 0.59 K/UL — SIGNIFICANT CHANGE UP (ref 0–0.9)
MONOCYTES NFR BLD AUTO: 8.8 % — SIGNIFICANT CHANGE UP (ref 2–14)
MONOCYTES NFR BLD: 7.2 % — SIGNIFICANT CHANGE UP (ref 2–9)
MYELOCYTES NFR BLD: 0 % — SIGNIFICANT CHANGE UP (ref 0–0)
NEUTROPHIL AB SER-ACNC: 36 % — LOW (ref 43–77)
NEUTROPHILS # BLD AUTO: 2.15 K/UL — SIGNIFICANT CHANGE UP (ref 1.8–7.4)
NEUTROPHILS NFR BLD AUTO: 32.3 % — LOW (ref 43–77)
NEUTS BAND # BLD: 0 % — SIGNIFICANT CHANGE UP (ref 0–6)
NRBC # FLD: 0 — SIGNIFICANT CHANGE UP
OTHER - HEMATOLOGY %: 0 — SIGNIFICANT CHANGE UP
OVALOCYTES BLD QL SMEAR: SIGNIFICANT CHANGE UP
PLATELET # BLD AUTO: 124 K/UL — LOW (ref 150–400)
PMV BLD: 10.9 FL — SIGNIFICANT CHANGE UP (ref 7–13)
POIKILOCYTOSIS BLD QL AUTO: SLIGHT — SIGNIFICANT CHANGE UP
POLYCHROMASIA BLD QL SMEAR: SLIGHT — SIGNIFICANT CHANGE UP
POTASSIUM SERPL-MCNC: 4 MMOL/L — SIGNIFICANT CHANGE UP (ref 3.5–5.3)
POTASSIUM SERPL-SCNC: 4 MMOL/L — SIGNIFICANT CHANGE UP (ref 3.5–5.3)
PROMYELOCYTES # FLD: 0 % — SIGNIFICANT CHANGE UP (ref 0–0)
RBC # BLD: 4.19 M/UL — LOW (ref 4.2–5.8)
RBC # FLD: 17.3 % — HIGH (ref 10.3–14.5)
SMUDGE CELLS # BLD: PRESENT — SIGNIFICANT CHANGE UP
SODIUM SERPL-SCNC: 141 MMOL/L — SIGNIFICANT CHANGE UP (ref 135–145)
VARIANT LYMPHS # BLD: 9 % — SIGNIFICANT CHANGE UP
WBC # BLD: 6.69 K/UL — SIGNIFICANT CHANGE UP (ref 3.8–10.5)
WBC # FLD AUTO: 6.69 K/UL — SIGNIFICANT CHANGE UP (ref 3.8–10.5)

## 2018-12-07 RX ORDER — ATORVASTATIN CALCIUM 80 MG/1
1 TABLET, FILM COATED ORAL
Qty: 0 | Refills: 0 | COMMUNITY

## 2018-12-07 RX ORDER — FUROSEMIDE 40 MG
1 TABLET ORAL
Qty: 0 | Refills: 0 | COMMUNITY

## 2018-12-07 RX ORDER — ISOSORBIDE MONONITRATE 60 MG/1
1 TABLET, EXTENDED RELEASE ORAL
Qty: 0 | Refills: 0 | COMMUNITY

## 2018-12-07 RX ORDER — LISINOPRIL 2.5 MG/1
1 TABLET ORAL
Qty: 0 | Refills: 0 | COMMUNITY

## 2018-12-07 RX ORDER — MIDODRINE HYDROCHLORIDE 2.5 MG/1
1 TABLET ORAL
Qty: 0 | Refills: 0 | COMMUNITY
Start: 2018-12-07

## 2018-12-07 RX ORDER — ERYTHROPOIETIN 10000 [IU]/ML
0 INJECTION, SOLUTION INTRAVENOUS; SUBCUTANEOUS
Qty: 0 | Refills: 0 | COMMUNITY
Start: 2018-12-07

## 2018-12-07 RX ORDER — CLOPIDOGREL BISULFATE 75 MG/1
1 TABLET, FILM COATED ORAL
Qty: 0 | Refills: 0 | COMMUNITY

## 2018-12-07 RX ADMIN — CHLORHEXIDINE GLUCONATE 1 APPLICATION(S): 213 SOLUTION TOPICAL at 05:02

## 2018-12-07 RX ADMIN — FAMOTIDINE 20 MILLIGRAM(S): 10 INJECTION INTRAVENOUS at 11:55

## 2018-12-07 RX ADMIN — LEVETIRACETAM 500 MILLIGRAM(S): 250 TABLET, FILM COATED ORAL at 11:55

## 2018-12-07 RX ADMIN — ERYTHROPOIETIN 10000 UNIT(S): 10000 INJECTION, SOLUTION INTRAVENOUS; SUBCUTANEOUS at 02:17

## 2018-12-07 NOTE — PROGRESS NOTE ADULT - PROBLEM SELECTOR PROBLEM 1
Abdominal distension
Pleural effusion on right
Pleural effusion on right
Traumatic hemorrhage of left cerebrum without loss of consciousness, initial encounter
Abdominal distension
ESRD (end stage renal disease) on dialysis
Pleural effusion on right
Pleural effusion on right
ESRD (end stage renal disease) on dialysis
Abdominal distension

## 2018-12-07 NOTE — PROGRESS NOTE ADULT - PROBLEM SELECTOR PLAN 1
ESRD on HD ( TTS) via AVF  Last HD on 12/6 without fluid removal. midodrine was given, BP still marginal (BP better today)  Plan for HD tmr  Monitor BMP and BP  Renal diet

## 2018-12-07 NOTE — PROGRESS NOTE ADULT - ASSESSMENT
67 yo M hx of ESRD on HD ( [facility]) DM HTN here for abdominal pain and distension during dialysis today. pt was at dialysis, began to feels generalized abdominal discomfort and was sent to ED. Reports over the past 2 weeks he had been to [facility] and dx with PNA and tx with abx and [facility] for abdominal pain/distension and was told everything is OK and d/c home. Reports that today at dialysis the pain started again so came to ED. Able to pass flatus. Denies cp sob weakness. CT abdomen reveals moderate ascites. Labs indicate mildly elevated Lipase. (27 Nov 2018 19:20)   pt can not provide noy hisotry at this time time as he seems very confused:     spoke to his wife: per her he does have SOB on exertion: He has no underlying pulmonary disease: hE WAS TREATED FOR [facility] AND AFTER HE WAS DCED HE WAS ALWAYS CONFUSED : aND WHEN HE WENT FOR DILAYSIS his belly was distended and he was SOB   he did have pneumonia but never had tuberculosis
68 year old man found to have left temporal subacute intraparenchymal hematoma with some subarachnoid component.  In discussion with wife patient is reported to have had minor head trauma in the bathroom 3 weeks ago.  Given location of hemorrhage as well as subarachnoid component I favor a traumatic ICH.  Patient's BP has been maintained under SBP<140 for hospital stay.
69 yo M hx of ESRD on HD ( [facility]) DM HTN here for abdominal pain and distension during dialysis today. pt was at dialysis, began to feels generalized abdominal discomfort and was sent to ED. Reports over the past 2 weeks he had been to [facility] and dx with PNA and tx with abx and [facility] for abdominal pain/distension and was told everything is OK and d/c home. Reports that today at dialysis the pain started again so came to ED. Able to pass flatus. Denies cp sob weakness. CT abdomen reveals moderate ascites. Labs indicate mildly elevated Lipase. (27 Nov 2018 19:20)   pt can not provide noy hisotry at this time time as he seems very confused:     spoke to his wife: per her he does have SOB on exertion: He has no underlying pulmonary disease: hE WAS TREATED FOR [facility] AND AFTER HE WAS DCED HE WAS ALWAYS CONFUSED : aND WHEN HE WENT FOR DILAYSIS his belly was distended and he was SOB   he did have pneumonia but never had tuberculosis
69 yo M hx of ESRD on HD ([facility]) DM HTN presents with abdominal distention noted with ascites
1.	ESRD, on HD, last was yesterday with incident.  2.	Ascites, likely Cardiac in origin. Though the ECHO does not show RHF, the Ascites plus large right Effusion is suggestive of such. Hence pericardial disease should also be considered.  3.	Hypertension.  4.	Anemia.    PLAN:  1.	HD as per orders.  2.	Follow up BMP.  3.	Pulmonary follow up.  4.	EPO for Anemia.
1.	ESRD, on HD.  2.	Ascites.  3.	Anemia.  4.	Diabetes.    PLAN:  1.	HD as per schedule.  2.	Paracentesis on Monday.  3.	Follow up BMP.
67 yo M hx of ESRD on HD ( [facility]) DM HTN here for abdominal pain and distension during dialysis today. pt was at dialysis, began to feels generalized abdominal discomfort and was sent to ED. Reports over the past 2 weeks he had been to [facility] and dx with PNA and tx with abx and [facility] for abdominal pain/distension and was told everything is OK and d/c home. Reports that today at dialysis the pain started again so came to ED. Able to pass flatus. Denies cp sob weakness. CT abdomen reveals moderate ascites. Labs indicate mildly elevated Lipase. (27 Nov 2018 19:20)   pt can not provide noy hisotry at this time time as he seems very confused:     spoke to his wife: per her he does have SOB on exertion: He has no underlying pulmonary disease: hE WAS TREATED FOR [facility] AND AFTER HE WAS DCED HE WAS ALWAYS CONFUSED : aND WHEN HE WENT FOR DILAYSIS his belly was distended and he was SOB   he did have pneumonia but never had tuberculosis
67 yo M hx of ESRD on HD ( [facility]) DM HTN presents with  abdominal pain and distension
67 yo M hx of ESRD on HD ( [facility]) DM HTN presents with  abdominal pain and distension   MRI shows subarachnoid bleed. off antiplatelet, on Keppra
67 yo M hx of ESRD on HD ( [facility]) DM HTN presents with  abdominal pain and distension   MRI shows subarachnoid bleed. off antiplatelet, on Keppra
67 yo M hx of ESRD on HD ([facility]) DM HTN presents with abdominal distention noted with ascites
68 year old man with ascities found to have unclear left temporal lesion.  CTA of the head does not reveal gross abnormalities (occlusions) to my eye, however await radiology read.  Patient has MRI Brain w/o contrast ordered.  Awaiting EEG as well.  Need better characterization of the left temporal lobe lesion prior to offering further recommendation. will continue to follow.
69 yo M hx of ESRD on HD ( [facility]) DM HTN here for abdominal pain and distension during dialysis today. pt was at dialysis, began to feels generalized abdominal discomfort and was sent to ED. Reports over the past 2 weeks he had been to [facility] and dx with PNA and tx with abx and [facility] for abdominal pain/distension and was told everything is OK and d/c home. Reports that today at dialysis the pain started again so came to ED. Able to pass flatus. Denies cp sob weakness. CT abdomen reveals moderate ascites. Labs indicate mildly elevated Lipase. (27 Nov 2018 19:20)   pt can not provide noy hisotry at this time time as he seems very confused:     spoke to his wife: per her he does have SOB on exertion: He has no underlying pulmonary disease: hE WAS TREATED FOR [facility] AND AFTER HE WAS DCED HE WAS ALWAYS CONFUSED : aND WHEN HE WENT FOR DILAYSIS his belly was distended and he was SOB   he did have pneumonia but never had tuberculosis
69 yo M hx of ESRD on HD ( [facility]) DM HTN presents with  abdominal pain and distension
69 yo M hx of ESRD on HD ( [facility]) DM HTN presents with  abdominal pain and distension   MRI shows subarachnoid bleed. off antiplatelet, on Keppra
69 yo M hx of ESRD on HD ([facility]) DM HTN presents with abdominal distention noted with ascites
EKG - NSR       a/p     1) Large volume ascites - ? cause, Liver ok on CT scan, albumin level ok , ammonia level 26,  2d echo shows normal LV mild LVOT obstruction and grossly normal RV , paracentesis look transudative,  s/p paracentesis 7 liters   Spoke to pts wife and told her about possible diagnosis of constrictive pericarditis, pts wife wants to take conservative route as pt has dementia,   MRI brain shows parenchymal and SAH in left temporal region, cancel SARAY, spoke to wife she agrees, will treat conservatively as pt is not a surgical candidate for cardiac surgery   2) ESRD on HD - nephrology on case     3) Confusion - likely sec to  parenchymal and SAH in left temporal region      4) h/o CVA - on tele, no arrythmias
EKG - NSR       a/p     1) Large volume ascites - ? cause, Liver ok on CT scan, albumin level ok , ammonia level 26,  2d echo shows normal LV mild LVOT obstruction and grossly normal RV unlikely cardiac cause, f/u diagnostic and theraputic papacentesis    2) ESRD on HD - nephrology on case     3) Confusion - ? cause, ammonia level normal ,  CT head shows  Vague hyperattenuation and subtle mass effect in the left temporal lobe   may represent underlying mass or resolving subacute hematoma.  Follow-up   contrast enhanced MRI is recommended for further characterization.  Multifocal areas of encephalomalacia/gliosis as discussed above.    consider neurology consult and MRI brain
EKG - NSR       a/p     1) Large volume ascites - ? cause, Liver ok on CT scan, albumin level ok , ammonia level 26,  2d echo shows normal LV mild LVOT obstruction and grossly normal RV unlikely cardiac cause, f/u diagnostic and theraputic papacentesis    2) ESRD on HD - nephrology on case     3) Confusion - ? cause, ammonia level normal ,  CT head shows  Vague hyperattenuation and subtle mass effect in the left temporal lobe   may represent underlying mass or resolving subacute hematoma.  Follow-up   contrast enhanced MRI is recommended for further characterization.  Multifocal areas of encephalomalacia/gliosis as discussed above.  Neurology on case , they think it could be embolic strokes, will bet blood cx, transfer to tele to r/o afib, SARAY monday   CTA with contrast no MRI as pt is ESRD
EKG - NSR       a/p     1) Large volume ascites - ? 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 cause, ammonia level normal ,  CT head shows  Vague hyperattenuation and subtle mass effect in the left temporal lobe   may represent underlying mass or resolving subacute hematoma.  Follow-up   contrast enhanced MRI is recommended for further characterization.  Multifocal areas of encephalomalacia/gliosis as discussed above.  Neurology on case , they think it could be embolic strokes, will bet blood cx, transfer to tele to r/o afib, SARAY monday   MRI brain w/o contrast pending        4) h/o CVA - on tele, no arrythmias
EKG - NSR       a/p     1) Large volume ascites - ? cause, Liver ok on CT scan, albumin level ok , ammonia level 26,  2d echo shows normal LV mild LVOT obstruction and grossly normal RV unlikely cardiac cause, paracentesis look transudative, will get 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 cause, ammonia level normal ,  CT head shows  Vague hyperattenuation and subtle mass effect in the left temporal lobe   may represent underlying mass or resolving subacute hematoma.  Follow-up   contrast enhanced MRI is recommended for further characterization.  Multifocal areas of encephalomalacia/gliosis as discussed above.  Neurology on case , they think it could be embolic strokes, will bet blood cx, transfer to tele to r/o afib, SARAY monday   MRI brain w/o contrast pending        4) h/o CVA - on tele, no arrythmias
EKG - NSR       a/p     1) Large volume ascites - ? 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 cause, ammonia level normal ,  CT head shows  Vague hyperattenuation and subtle mass effect in the left temporal lobe   may represent underlying mass or resolving subacute hematoma.  Follow-up   contrast enhanced MRI is recommended for further characterization.  Multifocal areas of encephalomalacia/gliosis as discussed above.  Neurology on case , they think it could be embolic strokes, will bet blood cx, transfer to tele to r/o afib, SARAY monday   MRI brain w/o contrast pending        4) h/o CVA - on tele, no arrythmias
EKG - NSR       a/p     1) Large volume ascites - ? cause, Liver ok on CT scan, albumin level ok , ammonia level 26,  2d echo shows normal LV mild LVOT obstruction and grossly normal RV unlikely cardiac cause, paracentesis look transudative, will get [facility] discharge paperwork, as per dialysis center he was told he has pericarditis    2) ESRD on HD - nephrology on case     3) Confusion - ? cause, ammonia level normal ,  CT head shows  Vague hyperattenuation and subtle mass effect in the left temporal lobe   may represent underlying mass or resolving subacute hematoma.  Follow-up   contrast enhanced MRI is recommended for further characterization.  Multifocal areas of encephalomalacia/gliosis as discussed above.  Neurology on case , they think it could be embolic strokes, will bet blood cx, transfer to tele to r/o afib, SARAY monday   MRI brain w/o contrast pending  CTA with contrast no MRI as pt is ESRD
67 yo M hx of ESRD on HD ( [facility]) DM HTN presents with  abdominal pain and distension

## 2018-12-07 NOTE — PROGRESS NOTE ADULT - SUBJECTIVE AND OBJECTIVE BOX
69 yo M hx of ESRD on HD ( [facility]) DM HTN here for abdominal pain and distension during dialysis today. Patient does not offer any complaints.    PAST MEDICAL & SURGICAL HISTORY:  ESRD (end stage renal disease) on dialysis  Type 2 diabetes mellitus with stage 4 chronic kidney disease, unspecified long term insulin use status  Essential hypertension  ESRD (end stage renal disease) on dialysis  No significant past surgical history      Allergies    No Known Allergies    Intolerances        MEDICATIONS  (STANDING):  atorvastatin 80 milliGRAM(s) Oral at bedtime  chlorhexidine 4% Liquid 1 Application(s) Topical two times a day  epoetin shakira Injectable 03304 Unit(s) IV Push <User Schedule>  famotidine    Tablet 20 milliGRAM(s) Oral daily  furosemide    Tablet 40 milliGRAM(s) Oral four times a day  isosorbide   mononitrate ER Tablet (IMDUR) 120 milliGRAM(s) Oral daily  lisinopril 20 milliGRAM(s) Oral daily  midodrine 5 milliGRAM(s) Oral <User Schedule>    MEDICATIONS  (PRN):      Social History: Denies tob, EtOH use     FAMILY HISTORY:          Physical Exam:   Vital Signs Last 24 Hrs  T(C): 36.4 (06 Dec 2018 13:06), Max: 36.7 (05 Dec 2018 21:12)  T(F): 97.5 (06 Dec 2018 13:06), Max: 98.1 (05 Dec 2018 21:12)  HR: 62 (06 Dec 2018 13:49) (55 - 64)  BP: 99/54 (06 Dec 2018 13:49) (92/56 - 99/57)  BP(mean): --  RR: 18 (06 Dec 2018 13:06) (16 - 18)  SpO2: 100% (06 Dec 2018 13:06) (99% - 100%)  General Exam:   General appearance: No acute distress                   Neurological Exam:  Mental Status: Patient is awake and alert to himself, he does follow commands however requires multiple prompts and some commands are not followed.  He can name glasses, pen, gloves however when transitioning he perseverates on previous item.  He is slow and makes errors when asked to repeat a sentence.  Limited spontaneous language production.    Cranial Nerves: EOMI, PERRL, VFF, V1-V3 intact, face symmetric, no dysarthria, tongue midline    Motor: left upper extremity drift.  There is atrophy throughout which is not asymmetric  Grossly 5/5 however I was unable to test specific muscle groups as patient did not follow all commands    Sensation:   Patient responds equally to pp however switches right and left, no neglect.    Labs:     CBC Full  -  ( 30 Nov 2018 06:30 )  WBC Count : 9.10 K/uL  Hemoglobin : 10.5 g/dL  Hematocrit : 33.2 %  Platelet Count - Automated : 143 K/uL  Mean Cell Volume : 93.0 fL  Mean Cell Hemoglobin : 29.4 pg  Mean Cell Hemoglobin Concentration : 31.6 %  Auto Neutrophil # : x  Auto Lymphocyte # : x  Auto Monocyte # : x  Auto Eosinophil # : x  Auto Basophil # : x  Auto Neutrophil % : x  Auto Lymphocyte % : x  Auto Monocyte % : x  Auto Eosinophil % : x  Auto Basophil % : x    11-30    138  |  97<L>  |  19  ----------------------------<  84  3.7   |  25  |  4.89<H>    Ca    9.3      30 Nov 2018 06:30  Phos  3.1     11-30  Mg     2.3     11-30    TPro  6.6  /  Alb  3.0<L>  /  TBili  0.7  /  DBili  x   /  AST  30  /  ALT  13  /  AlkPhos  235<H>  11-30    LIVER FUNCTIONS - ( 30 Nov 2018 06:30 )  Alb: 3.0 g/dL / Pro: 6.6 g/dL / ALK PHOS: 235 u/L / ALT: 13 u/L / AST: 30 u/L / GGT: x           PT/INR - ( 29 Nov 2018 06:00 )   PT: 11.6 SEC;   INR: 1.02          PTT - ( 29 Nov 2018 06:00 )  PTT:21.2 SEC      Radiology:
INTERVAL HPI/OVERNIGHT EVENTS:    seen this morning with wife present  wife reports 2 weeks ago at [facility] had a paracentesis with 2.5L removed  wife also reports she is aware he has a "bad heart" but refused surgery given "all the illnesses he has he wont do well"  pt had only diagnostic paracentesis done this morning, no further fluid removed  no pain at present   no n/v  no bm   (+) flatus while in room     MEDICATIONS  (STANDING):  atorvastatin 80 milliGRAM(s) Oral at bedtime  chlorhexidine 4% Liquid 1 Application(s) Topical two times a day  clopidogrel Tablet 75 milliGRAM(s) Oral daily  epoetin shakira Injectable 06388 Unit(s) IV Push <User Schedule>  famotidine    Tablet 20 milliGRAM(s) Oral daily  furosemide    Tablet 40 milliGRAM(s) Oral four times a day  isosorbide   mononitrate ER Tablet (IMDUR) 120 milliGRAM(s) Oral daily  lisinopril 20 milliGRAM(s) Oral daily  midodrine 5 milliGRAM(s) Oral <User Schedule>    MEDICATIONS  (PRN):      Allergies    No Known Allergies    Intolerances        Review of Systems: *confused        Vital Signs Last 24 Hrs  T(C): 36.6 (29 Nov 2018 11:50), Max: 36.8 (29 Nov 2018 06:12)  T(F): 97.9 (29 Nov 2018 11:50), Max: 98.2 (29 Nov 2018 06:12)  HR: 71 (29 Nov 2018 11:50) (70 - 76)  BP: 124/66 (29 Nov 2018 11:50) (102/66 - 132/67)  BP(mean): --  RR: 18 (29 Nov 2018 11:50) (17 - 18)  SpO2: 98% (29 Nov 2018 09:55) (97% - 99%)    PHYSICAL EXAM:    Constitutional: NAD  HEENT: EOMI, throat clear  Neck: No LAD, supple  Respiratory: CTA and P  Cardiovascular: S1 and S2, RRR, no M  Gastrointestinal: BS+, soft, NT/+D, neg HSM,  Extremities: No peripheral edema, neg clubbing, cyanosis  Vascular: 2+ peripheral pulses  Neurological: A/O x 0-1, no focal deficits  Psychiatric: Normal mood, normal affect  Skin: No rashes      LABS:                        10.1   10.58 )-----------( 134      ( 29 Nov 2018 06:00 )             32.9     11-29    140  |  98  |  27<H>  ----------------------------<  51<L>  4.3   |  21<L>  |  5.93<H>    Ca    9.4      29 Nov 2018 06:00  Phos  3.8     11-29  Mg     2.4     11-29      PT/INR - ( 29 Nov 2018 06:00 )   PT: 11.6 SEC;   INR: 1.02          PTT - ( 29 Nov 2018 06:00 )  PTT:21.2 SEC      RADIOLOGY & ADDITIONAL TESTS:    < from: Transthoracic Echocardiogram (11.28.18 @ 19:28) >    Patient name: MAGGIE BRANDON  YOB: 1950   Age: 68 (M)   MR#: [id number]  Study Date: 11/28/2018  Location: [id number]: GILBERT Villalpando  Study quality: Technically Difficult/Limited  Referring Physician: Zhanna Britton MD  Blood Pressure: 109/56 mmHg  Height: 170 cm  Weight: 72 kg  BSA: 1.8 m2  Heart Rate: 73 mmHg  ------------------------------------------------------------------------  PROCEDURE: Transthoracic echocardiogram with 2-D, M-Mode  and complete spectral and color flow Doppler.  INDICATION: Cardiomyopathy, unspecified (I42.9)  ------------------------------------------------------------------------  DIMENSIONS:  Dimensions:     Normal Values:  LA:     4.7 cm    2.0 - 4.0 cm  Ao:     2.9 cm    2.0 - 3.8 cm  SEPTUM: 0.7 cm    0.6 - 1.2 cm  PWT:    1.3 cm    0.6 - 1.1 cm  LVIDd:  3.5 cm    3.0 - 5.6 cm  LVIDs:  1.8 cm    1.8 - 4.0 cm  Derived Variables:  LVMI: 56 g/m2  RWT: 0.74  Fractional short: 49 %  Ejection Fraction (Visual Estimate): 80-85 %  Peak Velocity (m/sec): AoV=1.4  ------------------------------------------------------------------------  OBSERVATIONS:  Mitral Valve: Mitral annular calcification, otherwise  normal mitral valve.  No systolic anterior motion (DAFNE) of  themitral valve. Mild mitral regurgitation.  Mean  transmitral valve gradient equals 1 mm Hg.  Aortic Root: Normal aortic root.  Aortic Valve: Aortic valve not well visualized; appears  calcified. Due to the presence of poor echocardiographic  windows and the presence of LVOT obstruction, aortic valve  gradients were unable to be obtained.  Minimal aortic  regurgitation.  Peak left ventricular outflow tract  gradient equals 21 mm Hg, consistent with mild LVOT  obstruction.  Left Atrium: Mildly dilated left atrium.  LA volume index =  38 cc/m2.  Left Ventricle: Hyperdynamic left ventricle. Normal left  ventricular internal dimensions and wall thicknesses.  Right Heart: Normal right atrium. The right ventricle is  not well visualized; grossly normal right ventricular  systolic function. Normal tricuspid valve. Mild tricuspid  regurgitation. Pulmonic valve not well visualized.  Pericardium/PleuraNormal pericardium with no pericardial  effusion.  Hemodynamic: Estimated right ventricular systolicpressure  equals 33 mm Hg, assuming right atrial pressure equals 10  mm Hg, consistent with normal pulmonary pressures. None  ------------------------------------------------------------------------  CONCLUSIONS:  1. Mitral annular calcification, otherwise normal mitral  valve.  No systolic anterior motion (DAFNE) of the mitral  valve. Mild mitral regurgitation.  2. Aortic valve not well visualized; appears calcified. Due  to the presence of poor echocardiographic windows and the  presence of LVOT obstruction, aortic valve gradients were  unable to be obtained.  Minimal aortic regurgitation.  3. Hyperdynamic left ventricle. Peak left ventricular  outflow tract gradient equals 21 mm Hg, consistent with  mild LVOT obstruction.  4. The right ventricle is not well visualized; grossly  normal right ventricular systolic function.  *** No previous Echo exam.  ------------------------------------------------------------------------  Confirmed on  11/28/2018 - 22:22:28 by Fred Mcclendon M.D.  ------------------------------------------------------------------------    < end of copied text >
Patient is a 68y old  Male who presents with a chief complaint of Abdominal distension (03 Dec 2018 12:06)      Any change in ROS: Sitting in chair on room air: no SOB     MEDICATIONS  (STANDING):  albumin human 25% IVPB 42 Gram(s) IV Intermittent once  atorvastatin 80 milliGRAM(s) Oral at bedtime  chlorhexidine 4% Liquid 1 Application(s) Topical two times a day  epoetin shakira Injectable 52480 Unit(s) IV Push <User Schedule>  famotidine    Tablet 20 milliGRAM(s) Oral daily  levETIRAcetam 500 milliGRAM(s) Oral daily  lidocaine 1% Injectable 20 milliLiter(s) Local Injection once  midodrine 5 milliGRAM(s) Oral <User Schedule>    MEDICATIONS  (PRN):    Vital Signs Last 24 Hrs  T(C): 36.8 (03 Dec 2018 12:58), Max: 36.8 (03 Dec 2018 12:58)  T(F): 98.3 (03 Dec 2018 12:58), Max: 98.3 (03 Dec 2018 12:58)  HR: 65 (03 Dec 2018 12:58) (40 - 73)  BP: 103/63 (03 Dec 2018 12:58) (68/40 - 115/60)  BP(mean): --  RR: 18 (03 Dec 2018 12:58) (16 - 18)  SpO2: 98% (03 Dec 2018 12:58) (98% - 100%)    I&O's Summary        Physical Exam:   GENERAL: NAD, well-groomed, well-developed  HEENT: KAREN/   Atraumatic, Normocephalic  ENMT: No tonsillar erythema, exudates, or enlargement; Moist mucous membranes, Good dentition, No lesions  NECK: Supple, No JVD, Normal thyroid  CHEST/LUNG: CDecreased air entry rigth side   GI: : Soft, Nontender, Nondistended; Bowel sounds present  NERVOUS SYSTEM:  Alert & Oriented X1  EXTREMITIES:  2+ Peripheral Pulses, No clubbing, cyanosis, or edema  LYMPH: No lymphadenopathy noted  SKIN: No rashes or lesions  ENDOCRINOLOGY: No Thyromegaly  PSYCH: Confused    Labs:  27                            10.7   8.09  )-----------( 142      ( 03 Dec 2018 06:30 )             34.5                         10.5   8.22  )-----------( 137      ( 02 Dec 2018 05:45 )             34.0                         10.1   8.17  )-----------( 134      ( 01 Dec 2018 06:15 )             30.6                         10.5   9.10  )-----------( 143      ( 30 Nov 2018 06:30 )             33.2     12-03    140  |  99  |  17  ----------------------------<  78  4.0   |  25  |  5.54<H>  12-02    141  |  98  |  15  ----------------------------<  68<L>  3.7   |  27  |  4.59<H>  12-01    137  |  96<L>  |  23  ----------------------------<  86  3.4<L>   |  26  |  6.01<H>  11-30    138  |  97<L>  |  19  ----------------------------<  84  3.7   |  25  |  4.89<H>    Ca    9.4      03 Dec 2018 06:30  Ca    9.5      02 Dec 2018 05:45  Phos  3.4     12-03  Phos  3.1     12-02  Mg     2.2     12-03  Mg     2.3     12-02    TPro  6.4  /  Alb  3.0<L>  /  TBili  0.7  /  DBili  x   /  AST  32  /  ALT  18  /  AlkPhos  203<H>  12-03  TPro  6.8  /  Alb  3.2<L>  /  TBili  0.9  /  DBili  x   /  AST  34  /  ALT  18  /  AlkPhos  223<H>  12-02  TPro  6.3  /  Alb  2.8<L>  /  TBili  0.7  /  DBili  x   /  AST  29  /  ALT  13  /  AlkPhos  208<H>  12-01  TPro  6.6  /  Alb  3.0<L>  /  TBili  0.7  /  DBili  x   /  AST  30  /  ALT  13  /  AlkPhos  235<H>  11-30    CAPILLARY BLOOD GLUCOSE      POCT Blood Glucose.: 101 mg/dL (03 Dec 2018 12:21)  POCT Blood Glucose.: 85 mg/dL (03 Dec 2018 07:56)  POCT Blood Glucose.: 91 mg/dL (03 Dec 2018 06:33)  POCT Blood Glucose.: 101 mg/dL (03 Dec 2018 02:21)  POCT Blood Glucose.: 119 mg/dL (02 Dec 2018 14:59)     (11-29 @ 12:25)    LIVER FUNCTIONS - ( 03 Dec 2018 06:30 )  Alb: 3.0 g/dL / Pro: 6.4 g/dL / ALK PHOS: 203 u/L / ALT: 18 u/L / AST: 32 u/L / GGT: x               Fluid Source --  Albumin, Fluid--  Glucose, Fluid--  Protein total, Fluid--  Lacatate Dehydrogenase, Fluid--  pH, Fluid--  Cytopathology-Non Gyn Report  ACCESSION No:  [id number]    FRANKSOFIA MAGGIE                     1        Cytopathology Report            Specimen(s) Submitted  ASCITIC FLUID      Clinical History  68 years old male with ESRD, patient with ascites.      Gross Description  Received: 80  ml of yellow fluid in CytoLyt  Prepared: 1 ThinPrep slide, 1 cell block , 1 smear      Final Diagnosis  ASCITIC FLUID  NEGATIVE FOR MALIGNANT CELLS.  Cytology slides and cell block composed of benign mesothelial  cells and histiocytes in a background of  lymphocytes.    Screened by: Karrie SEO (ASCP)  Verified by: NOE Lau  (Electronic Signature)  Reported on: 11/30/18 13:32 [facility], [address] [place] [address]  [id number]  Cytology technical processing performed at [address] [place] [address]  _________________________________________________________________  Fluid Source --  Albumin, Fluid1.9 g/dL  Glucose, Fluid64 mg/dL  Protein total, Fluid3.8 g/dL  Lacatate Dehydrogenase, [id number] U/L  pH, Fluid--  Cytopathology-Non Gyn Report--        RECENT CULTURES:  11-30 @ 14:55 BLOOD VENOUS                  NO ORGANISMS ISOLATED  NO ORGANISMS ISOLATED AT 48 HRS.  11-30 @ 14:51 BLOOD PERIPHERAL         < from: CT Chest No Cont (12.02.18 @ 09:40) >  CHEST:     LUNGS AND LARGE AIRWAYS: Debris is noted within the trachea and right   mainstem bronchus.  3 mm left upper lobe noncalcified pulmonary nodule.   (Series 2, image 30). 2 mm nodule within the right upper lobe (series 2,   image 27). 3 mm groundglass opacity noted within the left lower lobe   (series 2, image 71). Partial compressive atelectasis of the right lower   lobe.  PLEURA: Moderate right pleural effusion is partially loculated.  VESSELS: Atherosclerotic calcifications of the aorta.  HEART: Heart size is normal. Small pericardial effusion and/or pleural   thickening. Coronary artery calcifications. Aortic valvular   calcifications.  MEDIASTINUM AND JAIME: No lymphadenopathy.  CHEST WALL AND LOWER NECK: Within normal limits.  VISUALIZED UPPER ABDOMEN: Large visualized volume of ascites. Partially   visualized bilaterally atrophic kidneys.  BONES: Degenerative changes of the spine.    IMPRESSION:     Partially loculated moderate-sized right pleural effusion, small   pericardial effusion and/or pleural thickening, and partially visualized   large volume of ascites. Constrictive pericarditis is a consideration.   Echo recommended for complete evaluation.    Small lung nodules measure up to 3 mm. One-year follow-up CT recommended   if there are risk factors for malignancy.                      DAISY ALVAREZ M.D., RADIOLOGY RESIDENT  This document has been electronically signed.    < end of copied text >           NO ORGANISMS ISOLATED  NO ORGANISMS ISOLATED AT 48 HRS.  11-29 @ 15:45 PERITONEAL FLUID                  11-29 @ 10:54 PERITONEAL FLUID       NOS^No Organisms Seen  WBC^White Blood Cells  QNTY CELLS IN GRAM STAIN: RARE (1+)             11-28 @ 02:22 NOSE                        RESPIRATORY CULTURES:          Studies  Chest X-RAY  CT SCAN Chest   Venous Dopplers: LE:   CT Abdomen  Others
Patient is a 68y old  Male who presents with a chief complaint of Abdominal distension (05 Dec 2018 11:28)      Any change in ROS: Events noted: He is not in any resp distress:  Wife is at bedside:   She is refusing for any invasive intervention on his chest:    MEDICATIONS  (STANDING):  atorvastatin 80 milliGRAM(s) Oral at bedtime  chlorhexidine 4% Liquid 1 Application(s) Topical two times a day  epoetin shakira Injectable 28600 Unit(s) IV Push <User Schedule>  famotidine    Tablet 20 milliGRAM(s) Oral daily  levETIRAcetam 500 milliGRAM(s) Oral daily  lidocaine 1% Injectable 20 milliLiter(s) Local Injection once  midodrine 5 milliGRAM(s) Oral <User Schedule>    MEDICATIONS  (PRN):    Vital Signs Last 24 Hrs  T(C): 36.3 (05 Dec 2018 12:49), Max: 36.5 (05 Dec 2018 06:21)  T(F): 97.4 (05 Dec 2018 12:49), Max: 97.7 (05 Dec 2018 06:21)  HR: 68 (05 Dec 2018 12:44) (68 - 69)  BP: 100/54 (05 Dec 2018 12:44) (100/54 - 121/76)  BP(mean): --  RR: 17 (05 Dec 2018 12:49) (17 - 17)  SpO2: 100% (05 Dec 2018 12:49) (98% - 100%)    I&O's Summary    04 Dec 2018 07:01  -  05 Dec 2018 07:00  --------------------------------------------------------  IN: 600 mL / OUT: 1700 mL / NET: -1100 mL          Physical Exam:   GENERAL: NAD, well-groomed, well-developed  HEENT: KAREN/   Atraumatic, Normocephalic  ENMT: No tonsillar erythema, exudates, or enlargement; Moist mucous membranes, Good dentition, No lesions  NECK: Supple, No JVD, Normal thyroid  CHEST/LUNG: Decreased air entry right side   CVS: Regular rate and rhythm; No murmurs, rubs, or gallops  GI: : Soft, Nontender, Nondistended; Bowel sounds present  NERVOUS SYSTEM:  Alert & Oriented X1  EXTREMITIES:  2+ Peripheral Pulses, No clubbing, cyanosis, or edema  LYMPH: No lymphadenopathy noted  SKIN: No rashes or lesions  ENDOCRINOLOGY: No Thyromegaly  PSYCH: Confused    Labs:                              12.1   9.53  )-----------( 141      ( 05 Dec 2018 06:40 )             38.5                         11.2   9.26  )-----------( 127      ( 04 Dec 2018 21:14 )             35.5                         10.7   8.09  )-----------( 142      ( 03 Dec 2018 06:30 )             34.5                         10.5   8.22  )-----------( 137      ( 02 Dec 2018 05:45 )             34.0     12-05    141  |  98  |  14  ----------------------------<  61<L>  3.6   |  28  |  4.71<H>  12-04    141  |  101  |  22  ----------------------------<  93  3.6   |  25  |  6.10<H>  12-03    140  |  99  |  17  ----------------------------<  78  4.0   |  25  |  5.54<H>  12-02    141  |  98  |  15  ----------------------------<  68<L>  3.7   |  27  |  4.59<H>    Ca    9.2      05 Dec 2018 06:40  Ca    9.0      04 Dec 2018 21:14  Mg     2.0     12-05  Mg     2.1     12-04    TPro  6.4  /  Alb  3.0<L>  /  TBili  0.7  /  DBili  x   /  AST  32  /  ALT  18  /  AlkPhos  203<H>  12-03  TPro  6.8  /  Alb  3.2<L>  /  TBili  0.9  /  DBili  x   /  AST  34  /  ALT  18  /  AlkPhos  223<H>  12-02    CAPILLARY BLOOD GLUCOSE      POCT Blood Glucose.: 81 mg/dL (05 Dec 2018 12:09)  POCT Blood Glucose.: 83 mg/dL (05 Dec 2018 07:50)  POCT Blood Glucose.: 88 mg/dL (04 Dec 2018 22:14)     (11-29 @ 12:25)      PT/INR - ( 05 Dec 2018 06:40 )   PT: 12.9 SEC;   INR: 1.13          PTT - ( 05 Dec 2018 06:40 )  PTT:32.4 SEC    Fluid Source --  Albumin, Fluid--  Glucose, Fluid--  Protein total, Fluid--  Lacatate Dehydrogenase, Fluid--  pH, Fluid--  Cytopathology-Non Gyn Report  ACCESSION No:  [id number]    RONJAVIERADELSO BLACKANTONIA                     1        Cytopathology Report            Specimen(s) Submitted  ASCITIC FLUID      Clinical History  68 years old male with ESRD, patient with ascites.      Gross Description  Received: 80  ml of yellow fluid in CytoLyt  Prepared: 1 ThinPrep slide, 1 cell block , 1 smear      Final Diagnosis  ASCITIC FLUID  NEGATIVE FOR MALIGNANT CELLS.  Cytology slides and cell block composed of benign mesothelial  cells and histiocytes in a background of  lymphocytes.    Screened by: Karrie SEO (ASCP)  Verified by: NOE Lau  (Electronic Signature)  Reported on: 11/30/18 13:32 EST, [address] [place] [address]  [phone]  Cytology technical processing performed at [address] [place] [id number]  _________________________________________________________________  Fluid Source --  Albumin, Fluid1.9 g/dL  Glucose, Fluid64 mg/dL  Protein total, Fluid3.8 g/dL  Lacatate Dehydrogenase, [id number] U/L  pH, Fluid--  Cytopathology-Non Gyn Report--        RECENT CULTURES:  11-30 @ 14:55 BLOOD VENOUS                  NO ORGANISMS ISOLATED  NO ORGANISMS ISOLATED AT 96 HOURS  11-30 @ 14:51 BLOOD PERIPHERAL              < from: MR Head No Cont (12.04.18 @ 15:22) >  cortex and sulci. This finding is suspicious for areas of subarachnoid   hemorrhage.    Abnormal areas of encephalomalacia and gliosis are seen involving the   bifrontal region which is likely result of old trauma.    Encephalomalacia and gliosis seen involving the right occipital region   which could be the result of old trauma or infarct.    Localized mass effect is seen. No significant shift or herniation is seen    The large vessels demonstrate normal flow voids    Inflammatory change involving both mastoids left greater than right.    Left maxillary polyp versus retention cysts in    Patient is status post bilateral cataract surgery.    Abnormal compression deformity is seen involving the C3 vertebral body.   This could be compatible osteoporotic compression fracture though   dedicated imaging of the cervical spine can be done for further   evaluation.    Impression: Parenchymal and subarachnoid hemorrhage suspected involving   the left temporal region. Clinical correlation and continued close   interval follow-up until resolution is recommended.    Areas of encephalomalacia and gliosis asdescribed above.    Findings discussed with Dr Knott on December 4, 2018 at 4:04 PM  with read   back.                  LOLLY CORBETT M.D., ATTENDING RADIOLOGIST  This document has been electronically signed. Dec  4 2018  4:04PM    < end of copied text >      NO ORGANISMS ISOLATED  NO ORGANISMS ISOLATED AT 96 HOURS  11-29 @ 15:45 PERITONEAL FLUID                  11-29 @ 10:54 PERITONEAL FLUID       NOS^No Organisms Seen  WBC^White Blood Cells  QNTY CELLS IN GRAM STAIN: RARE (1+)                   RESPIRATORY CULTURES:          Studies  Chest X-RAY  CT SCAN Chest   Venous Dopplers: LE:   CT Abdomen  Others
[facility]   MD JYOTI FAYE MD ANGELA WONG, PA    TEL:  OFFICE: [phone]  DR ARMIJO CELL: [phone]  DR. VALDEZ CELL: [phone]  LATANYA LEGGETT CELL: [phone]        Patient is a 68y old  Male who presents with a chief complaint of Abdominal distension (03 Dec 2018 12:03)      Patient seen and examined at bedside.     VITALS:  T(F): 98 (12-03-18 @ 06:04), Max: 98 (12-03-18 @ 06:04)  HR: 72 (12-03-18 @ 06:04)  BP: 108/63 (12-03-18 @ 06:04)  RR: 17 (12-03-18 @ 06:04)  SpO2: 99% (12-03-18 @ 06:04)  Wt(kg): --        PHYSICAL EXAM:  Constitutional: NAD, restless  Neck: No JVD  Respiratory: CTAB, no wheezes, rales or rhonchi  Cardiovascular: S1, S2, RRR  Gastrointestinal: distended, NT  Extremities: No peripheral edema    Hospital Medications:   MEDICATIONS  (STANDING):  albumin human 25% IVPB 42 Gram(s) IV Intermittent once  atorvastatin 80 milliGRAM(s) Oral at bedtime  chlorhexidine 4% Liquid 1 Application(s) Topical two times a day  epoetin shakira Injectable 49797 Unit(s) IV Push <User Schedule>  famotidine    Tablet 20 milliGRAM(s) Oral daily  levETIRAcetam 500 milliGRAM(s) Oral daily  lidocaine 1% Injectable 20 milliLiter(s) Local Injection once  midodrine 5 milliGRAM(s) Oral <User Schedule>      LABS:  12-03    140  |  99  |  17  ----------------------------<  78  4.0   |  25  |  5.54<H>    Ca    9.4      03 Dec 2018 06:30  Phos  3.4     12-03  Mg     2.2     12-03    TPro  6.4  /  Alb  3.0<L>  /  TBili  0.7  /  DBili      /  AST  32  /  ALT  18  /  AlkPhos  203<H>  12-03    Creatinine Trend: 5.54 <--, 4.59 <--, 6.01 <--, 4.89 <--, 5.93 <--, 4.63 <--, 6.44 <--    Phosphorus Level, Serum: 3.4 mg/dL (12-03 @ 06:30)  Albumin, Serum: 3.0 g/dL (12-03 @ 06:30)                              10.7   8.09  )-----------( 142      ( 03 Dec 2018 06:30 )             34.5     Urine Studies:      Iron 67, TIBC 177, %sat --      [11-29-18 @ 06:00]  Ferritin 1826      [11-29-18 @ 06:00]  PTH 71.24 (Ca --)      [11-27-18 @ 21:10]   --  HbA1c 5.4      [08-23-17 @ 08:08]    HBsAb >1000.0      [11-27-18 @ 21:10]  HBsAg Nonreactive      [11-29-18 @ 06:00]  HBcAb Nonreactive      [11-27-18 @ 21:10]  HCV 0.13, Nonreactive Hepatitis C AB  S/CO Ratio                        Interpretation  < 1.0                                     Non-Reactive  1.0 - 4.9                           Weakly-Reactive  > 5.0                                 Reactive  Non-Reactive: Aperson with a non-reactive HCV antibody  result is considered uninfected.  No further action is  needed unless recent infection is suspected.  In these  cases, consider repeat testing later to detect  seroconversion..  Weakly-Reactive: HCV antibody test is abnormal, HCV RNA  Qualitative test will follow.  Reactive: HCV antibody test is abnormal, HCV RNA  Qualitative test will follow.  Note: HCV antibody testing is performed on the Abbott   system.      [11-29-18 @ 06:00]  HIV Nonreactive The HIV Ag/Ab Combo test performed screens for HIV-1 p24  antigen, antibodies to HIV-1 (group M and group O), and  antibodies to HIV-2. All specimens repeatedly reactive  will reflex to an HIV 1/2 antibody confirmation and  differentiation test. This assay detects p24 antigen which  may be present prior to the development of HIV antibodies,  therefore a reactive result with a negative HIV 1/2 AB  Confirmation should be followed up with HIV-1 RNA, HIV-2  RNA and repeat testing in 4-8 weeks. A nonreactive result  does not preclude previous exposure to or infection with  HIV-1 or HIV-2. [facility] prohibits disclosure of this  result to any unauthorized party.      [12-02-18 @ 05:45]      RADIOLOGY & ADDITIONAL STUDIES:
Case d/w Dr. Desouza and Dr. Herrera, including medical status/family intentions.   Asymptomatic moderate loculated pleural effusion.   Not candidate for VATS at this time  Recommend IR drainage of loculated fluid.  D/w Dr. Almanza.
[facility]   MD JYOTI FAYE MD ANGELA WONG, PA    TEL:  OFFICE: [phone]  DR ARMIJO CELL: [phone]  DR. VALDEZ CELL: [phone]  LATANYA LEGGETT CELL: [phone]        Patient is a 68y old  Male who presents with a chief complaint of Abdominal distension (06 Dec 2018 16:58)      Patient seen and examined at bedside. No chest pain/sob    VITALS:  T(F): 97.8 (12-07-18 @ 05:00), Max: 98.4 (12-06-18 @ 21:08)  HR: 64 (12-07-18 @ 05:00)  BP: 108/68 (12-07-18 @ 05:00)  RR: 17 (12-07-18 @ 05:00)  SpO2: 99% (12-07-18 @ 05:00)  Wt(kg): --    12-06 @ 07:01  -  12-07 @ 07:00  --------------------------------------------------------  IN: 600 mL / OUT: 600 mL / NET: 0 mL          PHYSICAL EXAM:  Constitutional: NAD  Neck: No JVD  Respiratory: CTAB, no wheezes, rales or rhonchi  Cardiovascular: S1, S2, RRR  Gastrointestinal: BS+, soft, NT/ND  Extremities: No peripheral edema    Hospital Medications:   MEDICATIONS  (STANDING):  atorvastatin 80 milliGRAM(s) Oral at bedtime  chlorhexidine 4% Liquid 1 Application(s) Topical two times a day  epoetin shakira Injectable 60910 Unit(s) IV Push <User Schedule>  famotidine    Tablet 20 milliGRAM(s) Oral daily  levETIRAcetam 500 milliGRAM(s) Oral daily  lidocaine 1% Injectable 20 milliLiter(s) Local Injection once  midodrine 5 milliGRAM(s) Oral <User Schedule>      LABS:  12-07    141  |  100  |  11  ----------------------------<  84  4.0   |  31  |  3.82<H>    Ca    8.9      07 Dec 2018 05:10  Mg     2.0     12-07      Creatinine Trend: 3.82 <--, 5.94 <--, 4.71 <--, 6.10 <--, 5.54 <--, 4.59 <--, 6.01 <--                                12.4   6.69  )-----------( 124      ( 07 Dec 2018 05:10 )             39.7     Urine Studies:      Iron 67, TIBC 177, %sat --      [11-29-18 @ 06:00]  Ferritin 1826      [11-29-18 @ 06:00]  PTH 71.24 (Ca --)      [11-27-18 @ 21:10]   --  HbA1c 4.7      [12-07-18 @ 05:10]    HBsAb >1000.0      [11-27-18 @ 21:10]  HBsAg Nonreactive      [11-29-18 @ 06:00]  HBcAb Nonreactive      [11-27-18 @ 21:10]  HCV 0.13, Nonreactive Hepatitis C AB  S/CO Ratio                        Interpretation  < 1.0                                     Non-Reactive  1.0 - 4.9                           Weakly-Reactive  > 5.0                                 Reactive  Non-Reactive: Aperson with a non-reactive HCV antibody  result is considered uninfected.  No further action is  needed unless recent infection is suspected.  In these  cases, consider repeat testing later to detect  seroconversion..  Weakly-Reactive: HCV antibody test is abnormal, HCV RNA  Qualitative test will follow.  Reactive: HCV antibody test is abnormal, HCV RNA  Qualitative test will follow.  Note: HCV antibody testing is performed on the Abbott   system.      [11-29-18 @ 06:00]  HIV Nonreactive The HIV Ag/Ab Combo test performed screens for HIV-1 p24  antigen, antibodies to HIV-1 (group M and group O), and  antibodies to HIV-2. All specimens repeatedly reactive  will reflex to an HIV 1/2 antibody confirmation and  differentiation test. This assay detects p24 antigen which  may be present prior to the development of HIV antibodies,  therefore a reactive result with a negative HIV 1/2 AB  Confirmation should be followed up with HIV-1 RNA, HIV-2  RNA and repeat testing in 4-8 weeks. A nonreactive result  does not preclude previous exposure to or infection with  HIV-1 or HIV-2. [facility] prohibits disclosure of this  result to any unauthorized party.      [12-02-18 @ 05:45]      RADIOLOGY & ADDITIONAL STUDIES:
INTERVAL HPI/OVERNIGHT EVENTS:    remains confused  free of abdominal complaints  tolerating po intake; passed swallow eval    MEDICATIONS  (STANDING):  atorvastatin 80 milliGRAM(s) Oral at bedtime  chlorhexidine 4% Liquid 1 Application(s) Topical two times a day  epoetin shakira Injectable 43727 Unit(s) IV Push <User Schedule>  famotidine    Tablet 20 milliGRAM(s) Oral daily  furosemide    Tablet 40 milliGRAM(s) Oral four times a day  heparin  Injectable 5000 Unit(s) SubCutaneous every 12 hours  isosorbide   mononitrate ER Tablet (IMDUR) 120 milliGRAM(s) Oral daily  lisinopril 20 milliGRAM(s) Oral daily  midodrine 5 milliGRAM(s) Oral <User Schedule>    MEDICATIONS  (PRN):      Allergies    No Known Allergies    Intolerances        Review of Systems: *confused    General:  No wt loss, fevers, chills, night sweats, fatigue   Eyes:  Good vision, no reported pain  ENT:  No sore throat, pain, runny nose, dysphagia  CV:  No pain, palpitations, hypo/hypertension  Resp:  No dyspnea, cough, tachypnea, wheezing  GI:  No pain, No nausea, No vomiting, No diarrhea, No constipation, No weight loss, No fever, No pruritis, No rectal bleeding, No melena, No dysphagia  :  No pain, bleeding, incontinence, nocturia  Muscle:  No pain, weakness  Neuro:  No weakness, tingling, memory problems  Psych:  No fatigue, insomnia, mood problems, depression  Endocrine:  No polyuria, polydypsia, cold/heat intolerance  Heme:  No petechiae, ecchymosis, easy bruisability  Skin:  No rash, tattoos, scars, edema      Vital Signs Last 24 Hrs  T(C): 37.1 (30 Nov 2018 05:17), Max: 37.1 (29 Nov 2018 22:07)  T(F): 98.7 (30 Nov 2018 05:17), Max: 98.7 (29 Nov 2018 22:07)  HR: 70 (30 Nov 2018 05:17) (68 - 76)  BP: 120/75 (30 Nov 2018 05:17) (97/60 - 124/66)  BP(mean): --  RR: 18 (30 Nov 2018 05:17) (17 - 18)  SpO2: 99% (30 Nov 2018 05:17) (97% - 99%)    PHYSICAL EXAM:    Constitutional: NAD  HEENT: EOMI, throat clear  Neck: No LAD, supple  Respiratory: CTA and P  Cardiovascular: S1 and S2, RRR, no M  Gastrointestinal: BS+, soft, NT/ND, neg HSM,  Extremities: No peripheral edema, neg clubbing, cyanosis  Vascular: 2+ peripheral pulses  Neurological: A/O x 1  Psychiatric: Normal mood, normal affect  Skin: No rashes      LABS:                        10.5   9.10  )-----------( 143      ( 30 Nov 2018 06:30 )             33.2     11-30    138  |  97<L>  |  19  ----------------------------<  84  3.7   |  25  |  4.89<H>    Ca    9.3      30 Nov 2018 06:30  Phos  3.1     11-30  Mg     2.3     11-30    TPro  6.6  /  Alb  3.0<L>  /  TBili  0.7  /  DBili  x   /  AST  30  /  ALT  13  /  AlkPhos  235<H>  11-30    PT/INR - ( 29 Nov 2018 06:00 )   PT: 11.6 SEC;   INR: 1.02          PTT - ( 29 Nov 2018 06:00 )  PTT:21.2 SEC      RADIOLOGY & ADDITIONAL TESTS:    < from: CT Head No Cont (11.29.18 @ 19:46) >    EXAM:  CT BRAIN        PROCEDURE DATE:  Nov 29 2018         INTERPRETATION:  CLINICAL INFORMATION: Altered mental status.    TECHNIQUE:  Axial CT images were acquired through the head.  Intravenous contrast: None  Two-dimensional reformats were generated.    COMPARISON STUDY: None    FINDINGS:   There is an approximately 2.9 x 2.3 cm hyperattenuating focus and mild   mass effect in the left temporal lobe that could represent underlying   mass or resolving subacute hematoma (301:37).  There is   encephalomalacia/gliosis in the anterior inferior frontal lobes   bilaterally, more severe on the right, which may be sequela of prior   infarct, hemorrhage or trauma.  There is patchy encephalomalacia/gliosis   in the posterior right temporal lobe and right occipital lobe likely   reflecting chronic infarction in the right posterior cerebral artery   vascular territory.    There is no CT evidence of acute intracranial hemorrhage, midline shift   or large acute territorial infarct. The basal cisterns are patent.    There is mild generalized cerebral volume loss, slightly worse in the   right cerebral hemisphere.  There is asymmetric ex vacuo dilatation of   the temporal horn of the right lateral ventricle.  There is patchy   subcortical whitematter hypoattenuation that is nonspecific in etiology   but may reflect chronic microvascular ischemic disease.    The visualized paranasal sinuses are clear.    The mastoid air cells and middle ear cavities are clear.    The calvarium, skull base, and extracranial soft tissues are unremarkable.    IMPRESSION:   Vague hyperattenuation and subtle mass effect in the left temporal lobe   may represent underlying mass or resolving subacute hematoma.  Follow-up   contrast enhanced MRI is recommended for further characterization.    Multifocal areas of encephalomalacia/gliosis as discussed above.                  LOLLY DOSHI M.D.,ATTENDING RADIOLOGIST  This document has been electronically signed. Nov 29 2018  9:05PM                  < end of copied text >
INTERVAL HPI/OVERNIGHT EVENTS:  No new overnight event.  No N/V/D.  Tolerating diet.    MEDICATIONS  (STANDING):  atorvastatin 80 milliGRAM(s) Oral at bedtime  chlorhexidine 4% Liquid 1 Application(s) Topical two times a day  epoetin shakira Injectable 88893 Unit(s) IV Push <User Schedule>  famotidine    Tablet 20 milliGRAM(s) Oral daily  levETIRAcetam 500 milliGRAM(s) Oral daily  lidocaine 1% Injectable 20 milliLiter(s) Local Injection once  midodrine 5 milliGRAM(s) Oral <User Schedule>    MEDICATIONS  (PRN):      Allergies    No Known Allergies    Intolerances          General:  No wt loss, fevers, chills, night sweats, fatigue,   Eyes:  Good vision, no reported pain  ENT:  No sore throat, pain, runny nose, dysphagia  CV:  No pain, palpitations, hypo/hypertension  Resp:  No dyspnea, cough, tachypnea, wheezing  GI:  No pain, No nausea, No vomiting, No diarrhea, No constipation, No weight loss, No fever, No pruritis, No rectal bleeding, No tarry stools, No dysphagia,  :  No pain, bleeding, incontinence, nocturia  Muscle:  No pain, weakness  Neuro:  No weakness, tingling, memory problems  Psych:  No fatigue, insomnia, mood problems, depression  Endocrine:  No polyuria, polydipsia, cold/heat intolerance  Heme:  No petechiae, ecchymosis, easy bruisability  Skin:  No rash, tattoos, scars, edema      PHYSICAL EXAM:   Vital Signs:  Vital Signs Last 24 Hrs  T(C): 36.6 (07 Dec 2018 05:00), Max: 36.9 (06 Dec 2018 21:08)  T(F): 97.8 (07 Dec 2018 05:00), Max: 98.4 (06 Dec 2018 21:08)  HR: 64 (07 Dec 2018 05:00) (56 - 64)  BP: 108/68 (07 Dec 2018 05:00) (94/60 - 118/69)  BP(mean): --  RR: 17 (07 Dec 2018 05:00) (17 - 18)  SpO2: 99% (07 Dec 2018 05:00) (99% - 100%)  Daily     Daily Weight in k (07 Dec 2018 04:16)I&O's Summary    06 Dec 2018 07:01  -  07 Dec 2018 07:00  --------------------------------------------------------  IN: 600 mL / OUT: 600 mL / NET: 0 mL        GENERAL:  Appears stated age, well-groomed, well-nourished, no distress  HEENT:  NC/AT,  conjunctivae clear and pink, no thyromegaly, nodules, adenopathy, no JVD, sclera -anicteric  CHEST:  Full & symmetric excursion, no increased effort, breath sounds clear  HEART:  Regular rhythm, S1, S2, no murmur/rub/S3/S4, no abdominal bruit, no edema  ABDOMEN:  Soft, non-tender, non-distended, normoactive bowel sounds,  no masses ,no hepato-splenomegaly, no signs of chronic liver disease  EXTEREMITIES:  no cyanosis,clubbing or edema  SKIN:  No rash/erythema/ecchymoses/petechiae/wounds/abscess/warm/dry  NEURO:  Alert, oriented, no asterixis, no tremor, no encephalopathy      LABS:                        12.4   6.69  )-----------( 124      ( 07 Dec 2018 05:10 )             39.7     12-07    141  |  100  |  11  ----------------------------<  84  4.0   |  31  |  3.82<H>    Ca    8.9      07 Dec 2018 05:10  Mg     2.0     12-          amylase   lipase  RADIOLOGY & ADDITIONAL TESTS:
INTERVAL HPI/OVERNIGHT EVENTS:  Patient seen and examined       MEDICATIONS  (STANDING):  atorvastatin 80 milliGRAM(s) Oral at bedtime  chlorhexidine 4% Liquid 1 Application(s) Topical two times a day  epoetin shakira Injectable 43428 Unit(s) IV Push <User Schedule>  famotidine    Tablet 20 milliGRAM(s) Oral daily  furosemide    Tablet 40 milliGRAM(s) Oral four times a day  heparin  Injectable 5000 Unit(s) SubCutaneous every 12 hours  isosorbide   mononitrate ER Tablet (IMDUR) 120 milliGRAM(s) Oral daily  lisinopril 20 milliGRAM(s) Oral daily  midodrine 5 milliGRAM(s) Oral <User Schedule>    MEDICATIONS  (PRN):      Allergies    No Known Allergies    Intolerances        Review of Systems: *confused      Vital Signs Last 24 Hrs  T(C): 36.1 (02 Dec 2018 06:53), Max: 36.4 (01 Dec 2018 12:20)  T(F): 97 (02 Dec 2018 06:53), Max: 97.5 (01 Dec 2018 12:20)  HR: 71 (02 Dec 2018 06:53) (70 - 82)  BP: 114/71 (02 Dec 2018 06:53) (106/96 - 122/70)  BP(mean): --  RR: 16 (02 Dec 2018 06:53) (16 - 17)  SpO2: 100% (02 Dec 2018 06:53) (98% - 100%)    PHYSICAL EXAM:    Constitutional: NAD  HEENT: EOMI, throat clear  Neck: No LAD, supple  Respiratory: CTA and P  Cardiovascular: S1 and S2, RRR, no M  Gastrointestinal: BS+, soft, NT, distended   Extremities: No peripheral edema, neg clubbing, cyanosis  Vascular: 2+ peripheral pulses  Neurological: A/O x 1  Psychiatric: Normal mood, normal affect  Skin: No rashes      LABS:                                                 10.5   8.22  )-----------( 137      ( 02 Dec 2018 05:45 )             34.0   12-02    141  |  98  |  15  ----------------------------<  68<L>  3.7   |  27  |  4.59<H>    Ca    9.5      02 Dec 2018 05:45  Phos  3.1     12-02  Mg     2.3     12-02    TPro  6.8  /  Alb  3.2<L>  /  TBili  0.9  /  DBili  x   /  AST  34  /  ALT  18  /  AlkPhos  223<H>  12-02    RADIOLOGY & ADDITIONAL TESTS:    < from: CT Head No Cont (11.29.18 @ 19:46) >    EXAM:  CT BRAIN        PROCEDURE DATE:  Nov 29 2018         INTERPRETATION:  CLINICAL INFORMATION: Altered mental status.    TECHNIQUE:  Axial CT images were acquired through the head.  Intravenous contrast: None  Two-dimensional reformats were generated.    COMPARISON STUDY: None    FINDINGS:   There is an approximately 2.9 x 2.3 cm hyperattenuating focus and mild   mass effect in the left temporal lobe that could represent underlying   mass or resolving subacute hematoma (301:37).  There is   encephalomalacia/gliosis in the anterior inferior frontal lobes   bilaterally, more severe on the right, which may be sequela of prior   infarct, hemorrhage or trauma.  There is patchy encephalomalacia/gliosis   in the posterior right temporal lobe and right occipital lobe likely   reflecting chronic infarction in the right posterior cerebral artery   vascular territory.    There is no CT evidence of acute intracranial hemorrhage, midline shift   or large acute territorial infarct. The basal cisterns are patent.    There is mild generalized cerebral volume loss, slightly worse in the   right cerebral hemisphere.  There is asymmetric ex vacuo dilatation of   the temporal horn of the right lateral ventricle.  There is patchy   subcortical whitematter hypoattenuation that is nonspecific in etiology   but may reflect chronic microvascular ischemic disease.    The visualized paranasal sinuses are clear.    The mastoid air cells and middle ear cavities are clear.    The calvarium, skull base, and extracranial soft tissues are unremarkable.    IMPRESSION:   Vague hyperattenuation and subtle mass effect in the left temporal lobe   may represent underlying mass or resolving subacute hematoma.  Follow-up   contrast enhanced MRI is recommended for further characterization.    Multifocal areas of encephalomalacia/gliosis as discussed above.                  LOLLY DOSHI M.D.,ATTENDING RADIOLOGIST  This document has been electronically signed. Nov 29 2018  9:05PM                  < end of copied text >
INTERVAL HPI/OVERNIGHT EVENTS:  Patient seen and examined       MEDICATIONS  (STANDING):  atorvastatin 80 milliGRAM(s) Oral at bedtime  chlorhexidine 4% Liquid 1 Application(s) Topical two times a day  epoetin shakira Injectable 88689 Unit(s) IV Push <User Schedule>  famotidine    Tablet 20 milliGRAM(s) Oral daily  furosemide    Tablet 40 milliGRAM(s) Oral four times a day  heparin  Injectable 5000 Unit(s) SubCutaneous every 12 hours  isosorbide   mononitrate ER Tablet (IMDUR) 120 milliGRAM(s) Oral daily  lisinopril 20 milliGRAM(s) Oral daily  midodrine 5 milliGRAM(s) Oral <User Schedule>    MEDICATIONS  (PRN):      Allergies    No Known Allergies    Intolerances        Review of Systems: *confused      Vital Signs Last 24 Hrs  T(C): 36.1 (02 Dec 2018 06:53), Max: 36.4 (01 Dec 2018 12:20)  T(F): 97 (02 Dec 2018 06:53), Max: 97.5 (01 Dec 2018 12:20)  HR: 71 (02 Dec 2018 06:53) (70 - 82)  BP: 114/71 (02 Dec 2018 06:53) (106/96 - 122/70)  BP(mean): --  RR: 16 (02 Dec 2018 06:53) (16 - 17)  SpO2: 100% (02 Dec 2018 06:53) (98% - 100%)    PHYSICAL EXAM:    Constitutional: NAD  HEENT: EOMI, throat clear  Neck: No LAD, supple  Respiratory: CTA and P  Cardiovascular: S1 and S2, RRR, no M  Gastrointestinal: BS+, soft, NT, distended   Extremities: No peripheral edema, neg clubbing, cyanosis  Vascular: 2+ peripheral pulses  Neurological: A/O x 1  Psychiatric: Normal mood, normal affect  Skin: No rashes      LABS:                                                 10.5   8.22  )-----------( 137      ( 02 Dec 2018 05:45 )             34.0   12-02    141  |  98  |  15  ----------------------------<  68<L>  3.7   |  27  |  4.59<H>    Ca    9.5      02 Dec 2018 05:45  Phos  3.1     12-02  Mg     2.3     12-02    TPro  6.8  /  Alb  3.2<L>  /  TBili  0.9  /  DBili  x   /  AST  34  /  ALT  18  /  AlkPhos  223<H>  12-02    RADIOLOGY & ADDITIONAL TESTS:    < from: CT Head No Cont (11.29.18 @ 19:46) >    EXAM:  CT BRAIN        PROCEDURE DATE:  Nov 29 2018         INTERPRETATION:  CLINICAL INFORMATION: Altered mental status.    TECHNIQUE:  Axial CT images were acquired through the head.  Intravenous contrast: None  Two-dimensional reformats were generated.    COMPARISON STUDY: None    FINDINGS:   There is an approximately 2.9 x 2.3 cm hyperattenuating focus and mild   mass effect in the left temporal lobe that could represent underlying   mass or resolving subacute hematoma (301:37).  There is   encephalomalacia/gliosis in the anterior inferior frontal lobes   bilaterally, more severe on the right, which may be sequela of prior   infarct, hemorrhage or trauma.  There is patchy encephalomalacia/gliosis   in the posterior right temporal lobe and right occipital lobe likely   reflecting chronic infarction in the right posterior cerebral artery   vascular territory.    There is no CT evidence of acute intracranial hemorrhage, midline shift   or large acute territorial infarct. The basal cisterns are patent.    There is mild generalized cerebral volume loss, slightly worse in the   right cerebral hemisphere.  There is asymmetric ex vacuo dilatation of   the temporal horn of the right lateral ventricle.  There is patchy   subcortical whitematter hypoattenuation that is nonspecific in etiology   but may reflect chronic microvascular ischemic disease.    The visualized paranasal sinuses are clear.    The mastoid air cells and middle ear cavities are clear.    The calvarium, skull base, and extracranial soft tissues are unremarkable.    IMPRESSION:   Vague hyperattenuation and subtle mass effect in the left temporal lobe   may represent underlying mass or resolving subacute hematoma.  Follow-up   contrast enhanced MRI is recommended for further characterization.    Multifocal areas of encephalomalacia/gliosis as discussed above.                  LOLLY DOSHI M.D.,ATTENDING RADIOLOGIST  This document has been electronically signed. Nov 29 2018  9:05PM                  < end of copied text >
INTERVAL HPI/OVERNIGHT EVENTS:  Patient seen and examined   s/p LVP    MEDICATIONS  (STANDING):  atorvastatin 80 milliGRAM(s) Oral at bedtime  chlorhexidine 4% Liquid 1 Application(s) Topical two times a day  epoetin shakira Injectable 14590 Unit(s) IV Push <User Schedule>  famotidine    Tablet 20 milliGRAM(s) Oral daily  furosemide    Tablet 40 milliGRAM(s) Oral four times a day  heparin  Injectable 5000 Unit(s) SubCutaneous every 12 hours  isosorbide   mononitrate ER Tablet (IMDUR) 120 milliGRAM(s) Oral daily  lisinopril 20 milliGRAM(s) Oral daily  midodrine 5 milliGRAM(s) Oral <User Schedule>    MEDICATIONS  (PRN):      Allergies    No Known Allergies    Intolerances        Review of Systems: *confused      Vital Signs Last 24 Hrs  T(C): 36.1 (02 Dec 2018 06:53), Max: 36.4 (01 Dec 2018 12:20)  T(F): 97 (02 Dec 2018 06:53), Max: 97.5 (01 Dec 2018 12:20)  HR: 71 (02 Dec 2018 06:53) (70 - 82)  BP: 114/71 (02 Dec 2018 06:53) (106/96 - 122/70)  BP(mean): --  RR: 16 (02 Dec 2018 06:53) (16 - 17)  SpO2: 100% (02 Dec 2018 06:53) (98% - 100%)    PHYSICAL EXAM:    Constitutional: NAD  HEENT: EOMI, throat clear  Neck: No LAD, supple  Respiratory: CTA and P  Cardiovascular: S1 and S2, RRR, no M  Gastrointestinal: BS+, soft, NT, distended   Extremities: No peripheral edema, neg clubbing, cyanosis  Vascular: 2+ peripheral pulses  Neurological: A/O x 1  Psychiatric: Normal mood, normal affect  Skin: No rashes      LABS:                                                 10.5   8.22  )-----------( 137      ( 02 Dec 2018 05:45 )             34.0   12-02    141  |  98  |  15  ----------------------------<  68<L>  3.7   |  27  |  4.59<H>    Ca    9.5      02 Dec 2018 05:45  Phos  3.1     12-02  Mg     2.3     12-02    TPro  6.8  /  Alb  3.2<L>  /  TBili  0.9  /  DBili  x   /  AST  34  /  ALT  18  /  AlkPhos  223<H>  12-02    RADIOLOGY & ADDITIONAL TESTS:    < from: CT Head No Cont (11.29.18 @ 19:46) >    EXAM:  CT BRAIN        PROCEDURE DATE:  Nov 29 2018         INTERPRETATION:  CLINICAL INFORMATION: Altered mental status.    TECHNIQUE:  Axial CT images were acquired through the head.  Intravenous contrast: None  Two-dimensional reformats were generated.    COMPARISON STUDY: None    FINDINGS:   There is an approximately 2.9 x 2.3 cm hyperattenuating focus and mild   mass effect in the left temporal lobe that could represent underlying   mass or resolving subacute hematoma (301:37).  There is   encephalomalacia/gliosis in the anterior inferior frontal lobes   bilaterally, more severe on the right, which may be sequela of prior   infarct, hemorrhage or trauma.  There is patchy encephalomalacia/gliosis   in the posterior right temporal lobe and right occipital lobe likely   reflecting chronic infarction in the right posterior cerebral artery   vascular territory.    There is no CT evidence of acute intracranial hemorrhage, midline shift   or large acute territorial infarct. The basal cisterns are patent.    There is mild generalized cerebral volume loss, slightly worse in the   right cerebral hemisphere.  There is asymmetric ex vacuo dilatation of   the temporal horn of the right lateral ventricle.  There is patchy   subcortical whitematter hypoattenuation that is nonspecific in etiology   but may reflect chronic microvascular ischemic disease.    The visualized paranasal sinuses are clear.    The mastoid air cells and middle ear cavities are clear.    The calvarium, skull base, and extracranial soft tissues are unremarkable.    IMPRESSION:   Vague hyperattenuation and subtle mass effect in the left temporal lobe   may represent underlying mass or resolving subacute hematoma.  Follow-up   contrast enhanced MRI is recommended for further characterization.    Multifocal areas of encephalomalacia/gliosis as discussed above.                  LOLLY DOSHI M.D.,ATTENDING RADIOLOGIST  This document has been electronically signed. Nov 29 2018  9:05PM                  < end of copied text >
INTERVAL HPI/OVERNIGHT EVENTS:  Patient seen and examined   tolerating diet      MEDICATIONS  (STANDING):  atorvastatin 80 milliGRAM(s) Oral at bedtime  chlorhexidine 4% Liquid 1 Application(s) Topical two times a day  epoetin shakira Injectable 26298 Unit(s) IV Push <User Schedule>  famotidine    Tablet 20 milliGRAM(s) Oral daily  furosemide    Tablet 40 milliGRAM(s) Oral four times a day  heparin  Injectable 5000 Unit(s) SubCutaneous every 12 hours  isosorbide   mononitrate ER Tablet (IMDUR) 120 milliGRAM(s) Oral daily  lisinopril 20 milliGRAM(s) Oral daily  midodrine 5 milliGRAM(s) Oral <User Schedule>    MEDICATIONS  (PRN):      Allergies    No Known Allergies    Intolerances        Review of Systems: *confused      Vital Signs Last 24 Hrs  T(C): 36.1 (02 Dec 2018 06:53), Max: 36.4 (01 Dec 2018 12:20)  T(F): 97 (02 Dec 2018 06:53), Max: 97.5 (01 Dec 2018 12:20)  HR: 71 (02 Dec 2018 06:53) (70 - 82)  BP: 114/71 (02 Dec 2018 06:53) (106/96 - 122/70)  BP(mean): --  RR: 16 (02 Dec 2018 06:53) (16 - 17)  SpO2: 100% (02 Dec 2018 06:53) (98% - 100%)    PHYSICAL EXAM:    Constitutional: NAD  HEENT: EOMI, throat clear  Neck: No LAD, supple  Respiratory: CTA and P  Cardiovascular: S1 and S2, RRR, no M  Gastrointestinal: BS+, soft, NT, distended   Extremities: No peripheral edema, neg clubbing, cyanosis  Vascular: 2+ peripheral pulses  Neurological: A/O x 1  Psychiatric: Normal mood, normal affect  Skin: No rashes      LABS:                                                 10.5   8.22  )-----------( 137      ( 02 Dec 2018 05:45 )             34.0   12-02    141  |  98  |  15  ----------------------------<  68<L>  3.7   |  27  |  4.59<H>    Ca    9.5      02 Dec 2018 05:45  Phos  3.1     12-02  Mg     2.3     12-02    TPro  6.8  /  Alb  3.2<L>  /  TBili  0.9  /  DBili  x   /  AST  34  /  ALT  18  /  AlkPhos  223<H>  12-02    RADIOLOGY & ADDITIONAL TESTS:    < from: CT Head No Cont (11.29.18 @ 19:46) >    EXAM:  CT BRAIN        PROCEDURE DATE:  Nov 29 2018         INTERPRETATION:  CLINICAL INFORMATION: Altered mental status.    TECHNIQUE:  Axial CT images were acquired through the head.  Intravenous contrast: None  Two-dimensional reformats were generated.    COMPARISON STUDY: None    FINDINGS:   There is an approximately 2.9 x 2.3 cm hyperattenuating focus and mild   mass effect in the left temporal lobe that could represent underlying   mass or resolving subacute hematoma (301:37).  There is   encephalomalacia/gliosis in the anterior inferior frontal lobes   bilaterally, more severe on the right, which may be sequela of prior   infarct, hemorrhage or trauma.  There is patchy encephalomalacia/gliosis   in the posterior right temporal lobe and right occipital lobe likely   reflecting chronic infarction in the right posterior cerebral artery   vascular territory.    There is no CT evidence of acute intracranial hemorrhage, midline shift   or large acute territorial infarct. The basal cisterns are patent.    There is mild generalized cerebral volume loss, slightly worse in the   right cerebral hemisphere.  There is asymmetric ex vacuo dilatation of   the temporal horn of the right lateral ventricle.  There is patchy   subcortical whitematter hypoattenuation that is nonspecific in etiology   but may reflect chronic microvascular ischemic disease.    The visualized paranasal sinuses are clear.    The mastoid air cells and middle ear cavities are clear.    The calvarium, skull base, and extracranial soft tissues are unremarkable.    IMPRESSION:   Vague hyperattenuation and subtle mass effect in the left temporal lobe   may represent underlying mass or resolving subacute hematoma.  Follow-up   contrast enhanced MRI is recommended for further characterization.    Multifocal areas of encephalomalacia/gliosis as discussed above.                  LOLLY DOSHI M.D.,ATTENDING RADIOLOGIST  This document has been electronically signed. Nov 29 2018  9:05PM                  < end of copied text >
INTERVAL HPI/OVERNIGHT EVENTS:  Patient seen and examined,  remains confused, concerns for possible seizure activity     MEDICATIONS  (STANDING):  atorvastatin 80 milliGRAM(s) Oral at bedtime  chlorhexidine 4% Liquid 1 Application(s) Topical two times a day  epoetin shakira Injectable 44086 Unit(s) IV Push <User Schedule>  famotidine    Tablet 20 milliGRAM(s) Oral daily  furosemide    Tablet 40 milliGRAM(s) Oral four times a day  heparin  Injectable 5000 Unit(s) SubCutaneous every 12 hours  isosorbide   mononitrate ER Tablet (IMDUR) 120 milliGRAM(s) Oral daily  lisinopril 20 milliGRAM(s) Oral daily  midodrine 5 milliGRAM(s) Oral <User Schedule>    MEDICATIONS  (PRN):      Allergies    No Known Allergies    Intolerances        Review of Systems: *confused      Vital Signs Last 24 Hrs  T(C): 36.1 (02 Dec 2018 06:53), Max: 36.4 (01 Dec 2018 12:20)  T(F): 97 (02 Dec 2018 06:53), Max: 97.5 (01 Dec 2018 12:20)  HR: 71 (02 Dec 2018 06:53) (70 - 82)  BP: 114/71 (02 Dec 2018 06:53) (106/96 - 122/70)  BP(mean): --  RR: 16 (02 Dec 2018 06:53) (16 - 17)  SpO2: 100% (02 Dec 2018 06:53) (98% - 100%)    PHYSICAL EXAM:    Constitutional: NAD  HEENT: EOMI, throat clear  Neck: No LAD, supple  Respiratory: CTA and P  Cardiovascular: S1 and S2, RRR, no M  Gastrointestinal: BS+, soft, NT, distended   Extremities: No peripheral edema, neg clubbing, cyanosis  Vascular: 2+ peripheral pulses  Neurological: A/O x 1  Psychiatric: Normal mood, normal affect  Skin: No rashes      LABS:                                                 10.5   8.22  )-----------( 137      ( 02 Dec 2018 05:45 )             34.0   12-02    141  |  98  |  15  ----------------------------<  68<L>  3.7   |  27  |  4.59<H>    Ca    9.5      02 Dec 2018 05:45  Phos  3.1     12-02  Mg     2.3     12-02    TPro  6.8  /  Alb  3.2<L>  /  TBili  0.9  /  DBili  x   /  AST  34  /  ALT  18  /  AlkPhos  223<H>  12-02    RADIOLOGY & ADDITIONAL TESTS:    < from: CT Head No Cont (11.29.18 @ 19:46) >    EXAM:  CT BRAIN        PROCEDURE DATE:  Nov 29 2018         INTERPRETATION:  CLINICAL INFORMATION: Altered mental status.    TECHNIQUE:  Axial CT images were acquired through the head.  Intravenous contrast: None  Two-dimensional reformats were generated.    COMPARISON STUDY: None    FINDINGS:   There is an approximately 2.9 x 2.3 cm hyperattenuating focus and mild   mass effect in the left temporal lobe that could represent underlying   mass or resolving subacute hematoma (301:37).  There is   encephalomalacia/gliosis in the anterior inferior frontal lobes   bilaterally, more severe on the right, which may be sequela of prior   infarct, hemorrhage or trauma.  There is patchy encephalomalacia/gliosis   in the posterior right temporal lobe and right occipital lobe likely   reflecting chronic infarction in the right posterior cerebral artery   vascular territory.    There is no CT evidence of acute intracranial hemorrhage, midline shift   or large acute territorial infarct. The basal cisterns are patent.    There is mild generalized cerebral volume loss, slightly worse in the   right cerebral hemisphere.  There is asymmetric ex vacuo dilatation of   the temporal horn of the right lateral ventricle.  There is patchy   subcortical whitematter hypoattenuation that is nonspecific in etiology   but may reflect chronic microvascular ischemic disease.    The visualized paranasal sinuses are clear.    The mastoid air cells and middle ear cavities are clear.    The calvarium, skull base, and extracranial soft tissues are unremarkable.    IMPRESSION:   Vague hyperattenuation and subtle mass effect in the left temporal lobe   may represent underlying mass or resolving subacute hematoma.  Follow-up   contrast enhanced MRI is recommended for further characterization.    Multifocal areas of encephalomalacia/gliosis as discussed above.                  LOLLY DOSHI M.D.,ATTENDING RADIOLOGIST  This document has been electronically signed. Nov 29 2018  9:05PM                  < end of copied text >
INTERVAL HPI/OVERNIGHT EVENTS:  Patient seen and examined, resting comfortably in bed, remains confused     MEDICATIONS  (STANDING):  atorvastatin 80 milliGRAM(s) Oral at bedtime  chlorhexidine 4% Liquid 1 Application(s) Topical two times a day  epoetin shakira Injectable 18623 Unit(s) IV Push <User Schedule>  famotidine    Tablet 20 milliGRAM(s) Oral daily  furosemide    Tablet 40 milliGRAM(s) Oral four times a day  heparin  Injectable 5000 Unit(s) SubCutaneous every 12 hours  isosorbide   mononitrate ER Tablet (IMDUR) 120 milliGRAM(s) Oral daily  lisinopril 20 milliGRAM(s) Oral daily  midodrine 5 milliGRAM(s) Oral <User Schedule>    MEDICATIONS  (PRN):      Allergies    No Known Allergies    Intolerances        Review of Systems: *confused    General:  No wt loss, fevers, chills, night sweats, fatigue   Eyes:  Good vision, no reported pain  ENT:  No sore throat, pain, runny nose, dysphagia  CV:  No pain, palpitations, hypo/hypertension  Resp:  No dyspnea, cough, tachypnea, wheezing  GI:  No pain, No nausea, No vomiting, No diarrhea, No constipation, No weight loss, No fever, No pruritis, No rectal bleeding, No melena, No dysphagia  :  No pain, bleeding, incontinence, nocturia  Muscle:  No pain, weakness  Neuro:  No weakness, tingling, memory problems  Psych:  No fatigue, insomnia, mood problems, depression  Endocrine:  No polyuria, polydypsia, cold/heat intolerance  Heme:  No petechiae, ecchymosis, easy bruisability  Skin:  No rash, tattoos, scars, edema      Vital Signs Last 24 Hrs  T(C): 36.3 (01 Dec 2018 09:34), Max: 36.7 (01 Dec 2018 05:37)  T(F): 97.4 (01 Dec 2018 09:34), Max: 98.1 (01 Dec 2018 05:37)  HR: 68 (01 Dec 2018 09:34) (66 - 72)  BP: 119/72 (01 Dec 2018 09:34) (101/47 - 119/72)  BP(mean): --  RR: 18 (01 Dec 2018 09:34) (17 - 18)  SpO2: 98% (01 Dec 2018 05:37) (98% - 99%)    PHYSICAL EXAM:    Constitutional: NAD  HEENT: EOMI, throat clear  Neck: No LAD, supple  Respiratory: CTA and P  Cardiovascular: S1 and S2, RRR, no M  Gastrointestinal: BS+, soft, NT/ND  Extremities: No peripheral edema, neg clubbing, cyanosis  Vascular: 2+ peripheral pulses  Neurological: A/O x 1  Psychiatric: Normal mood, normal affect  Skin: No rashes      LABS:                                      10.1   8.17  )-----------( 134      ( 01 Dec 2018 06:15 )             30.6   12-01    137  |  96<L>  |  23  ----------------------------<  86  3.4<L>   |  26  |  6.01<H>    Ca    9.1      01 Dec 2018 06:15  Phos  3.8     12-01  Mg     2.2     12-01    TPro  6.3  /  Alb  2.8<L>  /  TBili  0.7  /  DBili  x   /  AST  29  /  ALT  13  /  AlkPhos  208<H>  12-01      RADIOLOGY & ADDITIONAL TESTS:    < from: CT Head No Cont (11.29.18 @ 19:46) >    EXAM:  CT BRAIN        PROCEDURE DATE:  Nov 29 2018         INTERPRETATION:  CLINICAL INFORMATION: Altered mental status.    TECHNIQUE:  Axial CT images were acquired through the head.  Intravenous contrast: None  Two-dimensional reformats were generated.    COMPARISON STUDY: None    FINDINGS:   There is an approximately 2.9 x 2.3 cm hyperattenuating focus and mild   mass effect in the left temporal lobe that could represent underlying   mass or resolving subacute hematoma (301:37).  There is   encephalomalacia/gliosis in the anterior inferior frontal lobes   bilaterally, more severe on the right, which may be sequela of prior   infarct, hemorrhage or trauma.  There is patchy encephalomalacia/gliosis   in the posterior right temporal lobe and right occipital lobe likely   reflecting chronic infarction in the right posterior cerebral artery   vascular territory.    There is no CT evidence of acute intracranial hemorrhage, midline shift   or large acute territorial infarct. The basal cisterns are patent.    There is mild generalized cerebral volume loss, slightly worse in the   right cerebral hemisphere.  There is asymmetric ex vacuo dilatation of   the temporal horn of the right lateral ventricle.  There is patchy   subcortical whitematter hypoattenuation that is nonspecific in etiology   but may reflect chronic microvascular ischemic disease.    The visualized paranasal sinuses are clear.    The mastoid air cells and middle ear cavities are clear.    The calvarium, skull base, and extracranial soft tissues are unremarkable.    IMPRESSION:   Vague hyperattenuation and subtle mass effect in the left temporal lobe   may represent underlying mass or resolving subacute hematoma.  Follow-up   contrast enhanced MRI is recommended for further characterization.    Multifocal areas of encephalomalacia/gliosis as discussed above.                  LOLLY DOSHI M.D.,ATTENDING RADIOLOGIST  This document has been electronically signed. Nov 29 2018  9:05PM                  < end of copied text >
[facility] DIVISION OF KIDNEY DISEASES AND HYPERTENSION -- HEMODIALYSIS NOTE  [phone]--------------------------------------------------------------------------------  Chief Complaint: ESRD/Ongoing hemodialysis requirement    24 hour events/subjective:  pt seen on dialysis tolerating well   denies sob, chest pain      PAST HISTORY  --------------------------------------------------------------------------------  No significant changes to PMH, PSH, FHx, SHx, unless otherwise noted    ALLERGIES & MEDICATIONS  --------------------------------------------------------------------------------  Allergies    No Known Allergies    Intolerances      Standing Inpatient Medications  atorvastatin 80 milliGRAM(s) Oral at bedtime  chlorhexidine 4% Liquid 1 Application(s) Topical two times a day  clopidogrel Tablet 75 milliGRAM(s) Oral daily  epoetin shakira Injectable 59040 Unit(s) IV Push <User Schedule>  famotidine    Tablet 20 milliGRAM(s) Oral daily  furosemide    Tablet 40 milliGRAM(s) Oral four times a day  isosorbide   mononitrate ER Tablet (IMDUR) 120 milliGRAM(s) Oral daily  lisinopril 20 milliGRAM(s) Oral daily  midodrine 5 milliGRAM(s) Oral <User Schedule>    PRN Inpatient Medications      REVIEW OF SYSTEMS  --------------------------------------------------------------------------------  As above.    VITALS/PHYSICAL EXAM  --------------------------------------------------------------------------------  T(C): 36.6 (11-29-18 @ 11:50), Max: 36.8 (11-29-18 @ 06:12)  HR: 71 (11-29-18 @ 11:50) (70 - 76)  BP: 124/66 (11-29-18 @ 11:50) (102/66 - 132/67)  RR: 18 (11-29-18 @ 11:50) (17 - 18)  SpO2: 98% (11-29-18 @ 09:55) (97% - 99%)  Wt(kg): --  Height (cm): 170.18 (11-27-18 @ 18:47)  Weight (kg): 72.3 (11-27-18 @ 18:47)  BMI (kg/m2): 25 (11-27-18 @ 18:47)  BSA (m2): 1.84 (11-27-18 @ 18:47)      Physical Exam:  	Gen: NAD  	HEENT: PERRL  	Pulm: CTA B/L  	CV: S1S2  	Abd: Soft, distended  	Ext: No LE edema B/L  	Neuro: Awake  	Skin: Warm and Dry   	Vascular access:     LABS/STUDIES  --------------------------------------------------------------------------------              10.1   10.58 >-----------<  134      [11-29-18 @ 06:00]              32.9     140  |  98  |  27  ----------------------------<  51      [11-29-18 @ 06:00]  4.3   |  21  |  5.93        Ca     9.4     [11-29-18 @ 06:00]      Mg     2.4     [11-29-18 @ 06:00]      Phos  3.8     [11-29-18 @ 06:00]    TPro  6.6  /  Alb  3.3  /  TBili  0.7  /  DBili  x   /  AST  36  /  ALT  16  /  AlkPhos  269  [11-27-18 @ 12:26]    PT/INR: PT 11.6 , INR 1.02       [11-29-18 @ 06:00]  PTT: 21.2       [11-29-18 @ 06:00]          [11-29-18 @ 06:00]    Iron 67, TIBC 177, %sat --      [11-29-18 @ 06:00]  Ferritin 1826      [11-29-18 @ 06:00]  PTH 71.24 (Ca --)      [11-27-18 @ 21:10]   --  HbA1c 5.4      [08-23-17 @ 08:08]    HBsAb >1000.0      [11-27-18 @ 21:10]  HBsAg NEGATIVE      [11-27-18 @ 21:10]  HBcAb Nonreactive      [11-27-18 @ 21:10]  HCV 0.13, Nonreactive Hepatitis C AB  S/CO Ratio                        Interpretation  < 1.0                                     Non-Reactive  1.0 - 4.9                           Weakly-Reactive  > 5.0                                 Reactive  Non-Reactive: [place] with a non-reactive HCV antibody  result is considered uninfected.  No further action is  needed unless recent infection is suspected.  In these  cases, consider repeat testing later to detect  seroconversion..  Weakly-Reactive: HCV antibody test is abnormal, HCV RNA  Qualitative test will follow.  Reactive: HCV antibody test is abnormal, HCV RNA  Qualitative test will follow.  Note: HCV antibody testing is performed on the [employer] system.      [11-27-18 @ 21:10]
Joaquim Desouza MD  Interventional Cardiology / Advance Heart Failure and Cardiac Transplant Specialist  [place] Office : [address] [place] [address]  Tel:   [place] Office : [address] [facility] [address] [address]  Tel: [phone]  Cell : [phone]    Subjective : Pt lying in bed comfortable, not in distress, confused  	  MEDICATIONS:  furosemide    Tablet 40 milliGRAM(s) Oral four times a day  isosorbide   mononitrate ER Tablet (IMDUR) 120 milliGRAM(s) Oral daily  lisinopril 20 milliGRAM(s) Oral daily  midodrine 5 milliGRAM(s) Oral <User Schedule>          famotidine    Tablet 20 milliGRAM(s) Oral daily    atorvastatin 80 milliGRAM(s) Oral at bedtime    chlorhexidine 4% Liquid 1 Application(s) Topical two times a day  epoetin shakira Injectable 34696 Unit(s) IV Push <User Schedule>      PHYSICAL EXAM:  T(C): 37.1 (11-30-18 @ 05:17), Max: 37.1 (11-29-18 @ 22:07)  HR: 70 (11-30-18 @ 05:17) (68 - 76)  BP: 120/75 (11-30-18 @ 05:17) (97/60 - 124/62)  RR: 18 (11-30-18 @ 05:17) (17 - 18)  SpO2: 99% (11-30-18 @ 05:17) (97% - 99%)  Wt(kg): --  I&O's Summary    29 Nov 2018 07:01  -  30 Nov 2018 07:00  --------------------------------------------------------  IN: 600 mL / OUT: 1700 mL / NET: -1100 mL          Appearance: Normal	  HEENT:   Normal oral mucosa, PERRL, EOMI	  Cardiovascular: Normal S1 S2, No JVD, No murmurs, No edema  Respiratory: Lungs clear to auscultation	  Gastrointestinal:  ascites  Extremities: Normal range of motion, No clubbing, cyanosis or edema                                    10.5   9.10  )-----------( 143      ( 30 Nov 2018 06:30 )             33.2     11-30    138  |  97<L>  |  19  ----------------------------<  84  3.7   |  25  |  4.89<H>    Ca    9.3      30 Nov 2018 06:30  Phos  3.1     11-30  Mg     2.3     11-30    TPro  6.6  /  Alb  3.0<L>  /  TBili  0.7  /  DBili  x   /  AST  30  /  ALT  13  /  AlkPhos  235<H>  11-30    proBNP:   Lipid Profile:   HgA1c:   TSH:
Joaquim Desouza MD  Interventional Cardiology / Advance Heart Failure and Cardiac Transplant Specialist  [place] Office : [address] [place] [address]  Tel:   [place] Office : [address] [facility] [address] [address]  Tel: [phone]  Cell : [phone]    Subjective : Pt lying in bed comfortable, not in distress,  confused s/p paracentesis  	  MEDICATIONS:  midodrine 5 milliGRAM(s) Oral <User Schedule>        levETIRAcetam 500 milliGRAM(s) Oral daily    famotidine    Tablet 20 milliGRAM(s) Oral daily    atorvastatin 80 milliGRAM(s) Oral at bedtime    albumin human 25% IVPB 42 Gram(s) IV Intermittent once  chlorhexidine 4% Liquid 1 Application(s) Topical two times a day  epoetin shakira Injectable 20886 Unit(s) IV Push <User Schedule>      PHYSICAL EXAM:  T(C): 36.7 (12-03-18 @ 06:04), Max: 36.7 (12-03-18 @ 06:04)  HR: 72 (12-03-18 @ 06:04) (40 - 74)  BP: 108/63 (12-03-18 @ 06:04) (68/40 - 115/60)  RR: 17 (12-03-18 @ 06:04) (16 - 17)  SpO2: 99% (12-03-18 @ 06:04) (98% - 100%)  Wt(kg): --  I&O's Summary        Appearance: Normal	  HEENT:   Normal oral mucosa, PERRL, EOMI	  Cardiovascular: Normal S1 S2, No JVD, No murmurs, No edema  Respiratory: decreased breath sounds right side  Gastrointestinal: + ascites  Extremities: Normal range of motion, No clubbing, cyanosis or edema                                    10.7   8.09  )-----------( 142      ( 03 Dec 2018 06:30 )             34.5     12-03    140  |  99  |  17  ----------------------------<  78  4.0   |  25  |  5.54<H>    Ca    9.4      03 Dec 2018 06:30  Phos  3.4     12-03  Mg     2.2     12-03    TPro  6.4  /  Alb  3.0<L>  /  TBili  0.7  /  DBili  x   /  AST  32  /  ALT  18  /  AlkPhos  203<H>  12-03    proBNP:   Lipid Profile:   HgA1c:   TSH:
Joaquim Desouza MD  Interventional Cardiology / Advance Heart Failure and Cardiac Transplant Specialist  [place] Office : [address] [place] [address]  Tel:   [place] Office : [address] [facility] [address] [address]  Tel: [phone]  Cell : [phone]    Subjective : Pt lying in bed comfortable, not in distress, confused    MEDICATIONS:  furosemide    Tablet 40 milliGRAM(s) Oral four times a day  isosorbide   mononitrate ER Tablet (IMDUR) 120 milliGRAM(s) Oral daily  lisinopril 20 milliGRAM(s) Oral daily  midodrine 5 milliGRAM(s) Oral <User Schedule>          famotidine    Tablet 20 milliGRAM(s) Oral daily    atorvastatin 80 milliGRAM(s) Oral at bedtime    chlorhexidine 4% Liquid 1 Application(s) Topical two times a day  epoetin shakira Injectable 65915 Unit(s) IV Push <User Schedule>      PHYSICAL EXAM:  T(C): 36.4 (12-01-18 @ 12:20), Max: 36.7 (12-01-18 @ 05:37)  HR: 71 (12-01-18 @ 12:20) (66 - 72)  BP: 122/70 (12-01-18 @ 12:20) (101/47 - 122/70)  RR: 17 (12-01-18 @ 12:20) (17 - 18)  SpO2: 99% (12-01-18 @ 12:20) (98% - 99%)  Wt(kg): --  I&O's Summary    01 Dec 2018 07:01  -  01 Dec 2018 13:55  --------------------------------------------------------  IN: 500 mL / OUT: 1662 mL / NET: -1162 mL          Appearance: Normal	  HEENT:   Normal oral mucosa, PERRL, EOMI	  Cardiovascular: Normal S1 S2, No JVD, No murmurs, No edema  Respiratory: Lungs clear to auscultation	  Gastrointestinal:  Soft, Non-tender, + BS	  Extremities: Normal range of motion, No clubbing, cyanosis or edema                                    10.1   8.17  )-----------( 134      ( 01 Dec 2018 06:15 )             30.6     12-01    137  |  96<L>  |  23  ----------------------------<  86  3.4<L>   |  26  |  6.01<H>    Ca    9.1      01 Dec 2018 06:15  Phos  3.8     12-01  Mg     2.2     12-01    TPro  6.3  /  Alb  2.8<L>  /  TBili  0.7  /  DBili  x   /  AST  29  /  ALT  13  /  AlkPhos  208<H>  12-01    proBNP:   Lipid Profile:   HgA1c:   TSH:
Joaquim Desouza MD  Interventional Cardiology / Advance Heart Failure and Cardiac Transplant Specialist  [place] Office : [address] [place] [address]  Tel:   [place] Office : [address] [facility] [address] [address]  Tel: [phone]  Cell : [phone]    Subjective : Pt lying in bed comfortable, not in distress, still confused  	  MEDICATIONS:  furosemide    Tablet 40 milliGRAM(s) Oral four times a day  isosorbide   mononitrate ER Tablet (IMDUR) 120 milliGRAM(s) Oral daily  lisinopril 20 milliGRAM(s) Oral daily  midodrine 5 milliGRAM(s) Oral <User Schedule>        levETIRAcetam 500 milliGRAM(s) Oral daily    famotidine    Tablet 20 milliGRAM(s) Oral daily    atorvastatin 80 milliGRAM(s) Oral at bedtime    chlorhexidine 4% Liquid 1 Application(s) Topical two times a day  epoetin shakira Injectable 73926 Unit(s) IV Push <User Schedule>      PHYSICAL EXAM:  T(C): 36.1 (12-02-18 @ 06:53), Max: 36.3 (12-01-18 @ 19:00)  HR: 71 (12-02-18 @ 06:53) (70 - 82)  BP: 114/71 (12-02-18 @ 06:53) (106/96 - 118/68)  RR: 16 (12-02-18 @ 06:53) (16 - 16)  SpO2: 100% (12-02-18 @ 06:53) (98% - 100%)  Wt(kg): --  I&O's Summary    01 Dec 2018 07:01  -  02 Dec 2018 07:00  --------------------------------------------------------  IN: 500 mL / OUT: 1662 mL / NET: -1162 mL          Appearance: Normal	  HEENT:   Normal oral mucosa, PERRL, EOMI	  Cardiovascular: Normal S1 S2, No JVD, No murmurs, No edema  Respiratory: Lungs clear to auscultation	  Gastrointestinal:  + ascites  Extremities: Normal range of motion, No clubbing, cyanosis or edema                                    10.5   8.22  )-----------( 137      ( 02 Dec 2018 05:45 )             34.0     12-02    141  |  98  |  15  ----------------------------<  68<L>  3.7   |  27  |  4.59<H>    Ca    9.5      02 Dec 2018 05:45  Phos  3.1     12-02  Mg     2.3     12-02    TPro  6.8  /  Alb  3.2<L>  /  TBili  0.9  /  DBili  x   /  AST  34  /  ALT  18  /  AlkPhos  223<H>  12-02    proBNP:   Lipid Profile:   HgA1c:   TSH:
Joaquim Desouza MD  Interventional Cardiology / Advance Heart Failure and Cardiac Transplant Specialist  [place] Office : [address] [place] [address] [facility] [address]  Tel:   [place] Office : [address] [facility] [address] [address]  Tel: [phone]  Cell : [phone]    Subjective : Pt lying in bed comfortable, not in distress, confused  	  MEDICATIONS:  clopidogrel Tablet 75 milliGRAM(s) Oral daily  midodrine 5 milliGRAM(s) Oral <User Schedule>        levETIRAcetam 500 milliGRAM(s) Oral daily    famotidine    Tablet 20 milliGRAM(s) Oral daily    atorvastatin 80 milliGRAM(s) Oral at bedtime    chlorhexidine 4% Liquid 1 Application(s) Topical two times a day  epoetin shakira Injectable 92450 Unit(s) IV Push <User Schedule>      PHYSICAL EXAM:  T(C): 36.8 (12-04-18 @ 13:13), Max: 36.8 (12-04-18 @ 05:20)  HR: 77 (12-04-18 @ 13:13) (60 - 77)  BP: 116/68 (12-04-18 @ 13:13) (108/71 - 120/99)  RR: 17 (12-04-18 @ 13:13) (17 - 17)  SpO2: 97% (12-04-18 @ 13:13) (97% - 98%)  Wt(kg): --  I&O's Summary        Appearance: Normal	  HEENT:   Normal oral mucosa, PERRL, EOMI	  Cardiovascular: Normal S1 S2, No JVD, No murmurs, No edema  Respiratory: b/l rhonchi  Gastrointestinal: + ascites  Extremities: Normal range of motion, No clubbing, cyanosis or edema                                    10.7   8.09  )-----------( 142      ( 03 Dec 2018 06:30 )             34.5     12-03    140  |  99  |  17  ----------------------------<  78  4.0   |  25  |  5.54<H>    Ca    9.4      03 Dec 2018 06:30  Phos  3.4     12-03  Mg     2.2     12-03    TPro  6.4  /  Alb  3.0<L>  /  TBili  0.7  /  DBili  x   /  AST  32  /  ALT  18  /  AlkPhos  203<H>  12-03    proBNP:   Lipid Profile:   HgA1c:   TSH:
Joaquim Desouza MD  Interventional Cardiology / Advance Heart Failure and Cardiac Transplant Specialist  [place] Office : [address] [place] [address]  Tel:   [place] Office : [address] [facility] [address] [address]  Tel: [phone]  Cell : [phone]    Subjective : Pt lying in bed comfortable, not in distress, confused  	  MEDICATIONS:  midodrine 5 milliGRAM(s) Oral <User Schedule>        levETIRAcetam 500 milliGRAM(s) Oral daily    famotidine    Tablet 20 milliGRAM(s) Oral daily    atorvastatin 80 milliGRAM(s) Oral at bedtime    chlorhexidine 4% Liquid 1 Application(s) Topical two times a day  epoetin shakira Injectable 01877 Unit(s) IV Push <User Schedule>      PHYSICAL EXAM:  T(C): 36.4 (12-06-18 @ 13:06), Max: 36.7 (12-05-18 @ 21:12)  HR: 62 (12-06-18 @ 13:49) (55 - 64)  BP: 99/54 (12-06-18 @ 13:49) (92/56 - 99/57)  RR: 18 (12-06-18 @ 13:06) (16 - 18)  SpO2: 100% (12-06-18 @ 13:06) (99% - 100%)  Wt(kg): --  I&O's Summary        Appearance: Normal	  HEENT:   Normal oral mucosa, PERRL, EOMI	  Cardiovascular: Normal S1 S2, No JVD, No murmurs, No edema  Respiratory: right sided decreased breath sounds  Gastrointestinal:  + ascites  Extremities: Normal range of motion, No clubbing, cyanosis or edema                                    11.2   9.31  )-----------( 136      ( 06 Dec 2018 06:30 )             35.7     12-06    142  |  99  |  20  ----------------------------<  93  3.6   |  29  |  5.94<H>    Ca    9.2      06 Dec 2018 06:30  Mg     2.2     12-06      proBNP:   Lipid Profile:   HgA1c:   TSH:
Joaquim Desouza MD  Interventional Cardiology / Advance Heart Failure and Cardiac Transplant Specialist  [place] Office : [address] [place] [address] [address] [address]  Tel:   [place] Office : [address] [facility] [address] [address]  Tel: [phone]  Cell : [phone]    Subjective : Pt lying in bed comfortable, not in distress, confused  	  MEDICATIONS:  midodrine 5 milliGRAM(s) Oral <User Schedule>        levETIRAcetam 500 milliGRAM(s) Oral daily    famotidine    Tablet 20 milliGRAM(s) Oral daily    atorvastatin 80 milliGRAM(s) Oral at bedtime    chlorhexidine 4% Liquid 1 Application(s) Topical two times a day  epoetin shakira Injectable 04512 Unit(s) IV Push <User Schedule>      PHYSICAL EXAM:  T(C): 36.6 (12-07-18 @ 05:00), Max: 36.9 (12-06-18 @ 21:08)  HR: 64 (12-07-18 @ 05:00) (56 - 64)  BP: 108/68 (12-07-18 @ 05:00) (94/60 - 118/69)  RR: 17 (12-07-18 @ 05:00) (17 - 18)  SpO2: 99% (12-07-18 @ 05:00) (99% - 100%)  Wt(kg): --  I&O's Summary    06 Dec 2018 07:01  -  07 Dec 2018 07:00  --------------------------------------------------------  IN: 600 mL / OUT: 600 mL / NET: 0 mL          Appearance: Normal	  HEENT:   Normal oral mucosa, PERRL, EOMI	  Cardiovascular: Normal S1 S2, No JVD, No murmurs, No edema  Respiratory: Lungs clear to auscultation	  Gastrointestinal: + ascites  Extremities: Normal range of motion, No clubbing, cyanosis or edema                                    12.4   6.69  )-----------( 124      ( 07 Dec 2018 05:10 )             39.7     12-07    141  |  100  |  11  ----------------------------<  84  4.0   |  31  |  3.82<H>    Ca    8.9      07 Dec 2018 05:10  Mg     2.0     12-07      proBNP:   Lipid Profile:   HgA1c: Hemoglobin A1C, Whole Blood: 4.7 % (12-07 @ 05:10)    TSH:
Joaquim Desouza MD  Interventional Cardiology / Advance Heart Failure and Cardiac Transplant Specialist  [place] Office : [address] [place] [address]  Tel:   [place] Office : [address] [facility] [address] [address]  Tel: [phone]  Cell : [phone]    Subjective : Pt lying in bed comfortable, not in distress, confused  	  MEDICATIONS:  furosemide    Tablet 40 milliGRAM(s) Oral four times a day  heparin  Injectable 5000 Unit(s) SubCutaneous every 12 hours  isosorbide   mononitrate ER Tablet (IMDUR) 120 milliGRAM(s) Oral daily  lisinopril 20 milliGRAM(s) Oral daily  midodrine 5 milliGRAM(s) Oral <User Schedule>  famotidine    Tablet 20 milliGRAM(s) Oral daily  atorvastatin 80 milliGRAM(s) Oral at bedtime  chlorhexidine 4% Liquid 1 Application(s) Topical two times a day  epoetin shakira Injectable 40333 Unit(s) IV Push <User Schedule>      PHYSICAL EXAM:  T(C): 36.8 (11-29-18 @ 18:12), Max: 36.8 (11-29-18 @ 06:12)  HR: 69 (11-29-18 @ 18:12) (69 - 76)  BP: 124/62 (11-29-18 @ 18:12) (102/66 - 132/67)  RR: 17 (11-29-18 @ 18:12) (17 - 18)  SpO2: 98% (11-29-18 @ 18:12) (97% - 99%)  Wt(kg): --  I&O's Summary    29 Nov 2018 07:01  -  29 Nov 2018 21:19  --------------------------------------------------------  IN: 600 mL / OUT: 1700 mL / NET: -1100 mL          Appearance: Normal	  HEENT:   Normal oral mucosa, PERRL, EOMI	  Cardiovascular: Normal S1 S2, No JVD, No murmurs, No edema  Respiratory: Lungs clear to auscultation	  Gastrointestinal:  + ascites  Extremities: Normal range of motion, No clubbing, cyanosis or edema                                    10.1   10.58 )-----------( 134      ( 29 Nov 2018 06:00 )             32.9     11-29    140  |  98  |  27<H>  ----------------------------<  51<L>  4.3   |  21<L>  |  5.93<H>    Ca    9.4      29 Nov 2018 06:00  Phos  3.8     11-29  Mg     2.4     11-29      proBNP:   Lipid Profile:   HgA1c:   TSH:
Joaquim Desouza MD  Interventional Cardiology / Advance Heart Failure and Cardiac Transplant Specialist  [place] Office : [address] [place] [address] [address]. [address]  Tel:   [place] Office : [address] [facility] [place] [address]  Tel: [phone]  Cell : [phone]    Subjective : Pt lying in bed comfortable, not in distress, denies any chest pain or SOB, confused  	  MEDICATIONS:  midodrine 5 milliGRAM(s) Oral <User Schedule>        levETIRAcetam 500 milliGRAM(s) Oral daily    famotidine    Tablet 20 milliGRAM(s) Oral daily    atorvastatin 80 milliGRAM(s) Oral at bedtime    chlorhexidine 4% Liquid 1 Application(s) Topical two times a day  epoetin shakira Injectable 36625 Unit(s) IV Push <User Schedule>      PHYSICAL EXAM:  T(C): 36.5 (12-05-18 @ 06:21), Max: 36.8 (12-04-18 @ 13:13)  HR: 68 (12-05-18 @ 06:21) (68 - 77)  BP: 104/68 (12-05-18 @ 06:21) (104/68 - 121/76)  RR: 17 (12-05-18 @ 06:21) (17 - 17)  SpO2: 98% (12-05-18 @ 06:21) (97% - 98%)  Wt(kg): --  I&O's Summary    04 Dec 2018 07:01  -  05 Dec 2018 07:00  --------------------------------------------------------  IN: 600 mL / OUT: 1700 mL / NET: -1100 mL          Appearance: Normal	  HEENT:   Normal oral mucosa, PERRL, EOMI	  Cardiovascular: Normal S1 S2, No JVD, No murmurs, No edema  Respiratory: right side decreased breath sounds  Gastrointestinal:  + ascites  Extremities: Normal range of motion, No clubbing, cyanosis or edema                                    12.1   9.53  )-----------( 141      ( 05 Dec 2018 06:40 )             38.5     12-05    141  |  98  |  14  ----------------------------<  61<L>  3.6   |  28  |  4.71<H>    Ca    9.2      05 Dec 2018 06:40  Mg     2.0     12-05      proBNP:   Lipid Profile:   HgA1c:   TSH:
[facility]   MD JYOTI FAYE MD ANGELA WONG, PA    TEL:  OFFICE: [phone]  DR ARMIJO CELL: [phone]  DR. VALDEZ CELL: [phone]  LATANYA LEGGETT CELL: [phone]        Patient is a 68y old  Male who presents with a chief complaint of Abdominal distension (05 Dec 2018 18:17)      Patient seen and examined at bedside. wife with him at bedside. patient is comfortable     VITALS:  T(F): 97.4 (12-05-18 @ 12:49), Max: 97.7 (12-05-18 @ 06:21)  HR: 68 (12-05-18 @ 12:44)  BP: 100/54 (12-05-18 @ 12:44)  RR: 17 (12-05-18 @ 12:49)  SpO2: 100% (12-05-18 @ 12:49)  Wt(kg): --    12-04 @ 07:01  -  12-05 @ 07:00  --------------------------------------------------------  IN: 600 mL / OUT: 1700 mL / NET: -1100 mL          PHYSICAL EXAM:  Constitutional: NAD  Neck: No JVD  Respiratory: CTAB, no wheezes, rales or rhonchi  Cardiovascular: S1, S2, RRR  Gastrointestinal: BS+, soft, distended NT  Extremities: No peripheral edema    Hospital Medications:   MEDICATIONS  (STANDING):  atorvastatin 80 milliGRAM(s) Oral at bedtime  chlorhexidine 4% Liquid 1 Application(s) Topical two times a day  epoetin shakira Injectable 14941 Unit(s) IV Push <User Schedule>  famotidine    Tablet 20 milliGRAM(s) Oral daily  levETIRAcetam 500 milliGRAM(s) Oral daily  lidocaine 1% Injectable 20 milliLiter(s) Local Injection once  midodrine 5 milliGRAM(s) Oral <User Schedule>      LABS:  12-05    141  |  98  |  14  ----------------------------<  61<L>  3.6   |  28  |  4.71<H>    Ca    9.2      05 Dec 2018 06:40  Mg     2.0     12-05      Creatinine Trend: 4.71 <--, 6.10 <--, 5.54 <--, 4.59 <--, 6.01 <--, 4.89 <--, 5.93 <--                                12.1   9.53  )-----------( 141      ( 05 Dec 2018 06:40 )             38.5     Urine Studies:      Iron 67, TIBC 177, %sat --      [11-29-18 @ 06:00]  Ferritin 1826      [11-29-18 @ 06:00]  PTH 71.24 (Ca --)      [11-27-18 @ 21:10]   --  HbA1c 5.4      [08-23-17 @ 08:08]    HBsAb >1000.0      [11-27-18 @ 21:10]  HBsAg Nonreactive      [11-29-18 @ 06:00]  HBcAb Nonreactive      [11-27-18 @ 21:10]  HCV 0.13, Nonreactive Hepatitis C AB  S/CO Ratio                        Interpretation  < 1.0                                     Non-Reactive  1.0 - 4.9                           Weakly-Reactive  > 5.0                                 Reactive  Non-Reactive: Aperson with a non-reactive HCV antibody  result is considered uninfected.  No further action is  needed unless recent infection is suspected.  In these  cases, consider repeat testing later to detect  seroconversion..  Weakly-Reactive: HCV antibody test is abnormal, HCV RNA  Qualitative test will follow.  Reactive: HCV antibody test is abnormal, HCV RNA  Qualitative test will follow.  Note: HCV antibody testing is performed on the Abbott   system.      [11-29-18 @ 06:00]  HIV Nonreactive The HIV Ag/Ab Combo test performed screens for HIV-1 p24  antigen, antibodies to HIV-1 (group M and group O), and  antibodies to HIV-2. All specimens repeatedly reactive  will reflex to an HIV 1/2 antibody confirmation and  differentiation test. This assay detects p24 antigen which  may be present prior to the development of HIV antibodies,  therefore a reactive result with a negative HIV 1/2 AB  Confirmation should be followed up with HIV-1 RNA, HIV-2  RNA and repeat testing in 4-8 weeks. A nonreactive result  does not preclude previous exposure to or infection with  HIV-1 or HIV-2. [facility] prohibits disclosure of this  result to any unauthorized party.      [12-02-18 @ 05:45]      RADIOLOGY & ADDITIONAL STUDIES:
MAGGIE BRANDON  68y  Patient is a 68y old  Male who presents with a chief complaint of Abdominal distension (01 Dec 2018 10:50)    HPI:  This is a patient with ESRD who was admitted for abdominal pains. Has ascites. HD this am but again was hypotensive. No new complaints.    HEALTH ISSUES - PROBLEM Dx:  Delirium: Delirium  Altered mental status: Altered mental status  Left ventricular outflow obstruction: Left ventricular outflow obstruction  Anemia due to chronic kidney disease, on chronic dialysis: Anemia due to chronic kidney disease, on chronic dialysis  Benign essential hypertension: Benign essential hypertension  ESRD (end stage renal disease) on dialysis: ESRD (end stage renal disease) on dialysis  Abdominal distension: Abdominal distension  Chronic kidney disease-mineral and bone disorder: Chronic kidney disease-mineral and bone disorder  HTN (hypertension): HTN (hypertension)  Anemia: Anemia  ESRD (end stage renal disease): ESRD (end stage renal disease)        MEDICATIONS  (STANDING):  atorvastatin 80 milliGRAM(s) Oral at bedtime  chlorhexidine 4% Liquid 1 Application(s) Topical two times a day  epoetin shakira Injectable 71677 Unit(s) IV Push <User Schedule>  famotidine    Tablet 20 milliGRAM(s) Oral daily  furosemide    Tablet 40 milliGRAM(s) Oral four times a day  isosorbide   mononitrate ER Tablet (IMDUR) 120 milliGRAM(s) Oral daily  lisinopril 20 milliGRAM(s) Oral daily  midodrine 5 milliGRAM(s) Oral <User Schedule>    MEDICATIONS  (PRN):    Vital Signs Last 24 Hrs  T(C): 36.3 (01 Dec 2018 09:34), Max: 36.7 (01 Dec 2018 05:37)  T(F): 97.4 (01 Dec 2018 09:34), Max: 98.1 (01 Dec 2018 05:37)  HR: 68 (01 Dec 2018 09:34) (66 - 72)  BP: 119/72 (01 Dec 2018 09:34) (101/47 - 119/72)  BP(mean): --  RR: 18 (01 Dec 2018 09:34) (17 - 18)  SpO2: 98% (01 Dec 2018 05:37) (98% - 99%)  Daily     Daily Weight in k (01 Dec 2018 09:34)    PHYSICAL EXAM:  Constitutional:   appears comfortable and not distressed. Not diaphoretic.    Neck:  The thyroid is normal. Trachea is midline.     Breasts: Normal examination.    Respiratory: The lungs are clear to auscultation. No dullness and expansion is normal.    Cardiovascular: S1 and S2 are normal. No mummurs, rubs or gallops are present.    Gastrointestinal: The abdomen is soft. No tenderness is present. No masses are present. Bowel sounds are normal.    Genitourinary: The bladder is not distended. No CVA tenderness is present.    Extremities: No edema is noted. No deformities are present.    Neurological: Awake, not fully oriented.    Skin: No lesions are seen  or palpated.    LABS:                        10.1   8.17  )-----------( 134      ( 01 Dec 2018 06:15 )             30.6     12-    137  |  96<L>  |  23  ----------------------------<  86  3.4<L>   |  26  |  6.01<H>    Ca    9.1      01 Dec 2018 06:15  Phos  3.8     12  Mg     2.2     12    TPro  6.3  /  Alb  2.8<L>  /  TBili  0.7  /  DBili  x   /  AST  29  /  ALT  13  /  AlkPhos  208<H>  12-    < from: CT Abdomen and Pelvis w/ IV Cont (18 @ 15:33) >    Large volume of ascites.   Small right pleural effusion with adjacent compressive atelectasis. Trace   pericardial effusion.      < end of copied text >  < from: Transthoracic Echocardiogram (18 @ 19:28) >  1. Mitral annular calcification, otherwise normal mitral  valve.  No systolic anterior motion (DAFNE) of the mitral  valve. Mild mitral regurgitation.  2. Aortic valve not well visualized; appears calcified. Due  to the presence of poor echocardiographic windows and the  presence of LVOT obstruction, aortic valve gradients were  unable to be obtained.  Minimal aortic regurgitation.  3. Hyperdynamic left ventricle. Peak left ventricular  outflow tract gradient equals 21 mm Hg, consistent with  mild LVOT obstruction.  4. The right ventricle is not well visualized; grossly  normal right ventricular systolic function.    < end of copied text >
MAGGIE BRANDON  HPI:  This is a patient with ESRD on HD who was admitted for abdominal distention. He was dialysed yesterday without incident. His acces is pulsatile but has otherwise normal parameters.  His memory is poor and is not able to give a good history.    HEALTH ISSUES - PROBLEM Dx:  Pleural effusion on right: Pleural effusion on right  Delirium: Delirium  Altered mental status: Altered mental status  Left ventricular outflow obstruction: Left ventricular outflow obstruction  Anemia due to chronic kidney disease, on chronic dialysis: Anemia due to chronic kidney disease, on chronic dialysis  Benign essential hypertension: Benign essential hypertension  ESRD (end stage renal disease) on dialysis: ESRD (end stage renal disease) on dialysis  Abdominal distension: Abdominal distension  Chronic kidney disease-mineral and bone disorder: Chronic kidney disease-mineral and bone disorder  HTN (hypertension): HTN (hypertension)  Anemia: Anemia  ESRD (end stage renal disease): ESRD (end stage renal disease)        MEDICATIONS  (STANDING):  atorvastatin 80 milliGRAM(s) Oral at bedtime  chlorhexidine 4% Liquid 1 Application(s) Topical two times a day  epoetin shakira Injectable 23220 Unit(s) IV Push <User Schedule>  famotidine    Tablet 20 milliGRAM(s) Oral daily  furosemide    Tablet 40 milliGRAM(s) Oral two times a day  isosorbide   mononitrate ER Tablet (IMDUR) 120 milliGRAM(s) Oral daily  levETIRAcetam 500 milliGRAM(s) Oral daily  lisinopril 20 milliGRAM(s) Oral daily  midodrine 5 milliGRAM(s) Oral <User Schedule>    MEDICATIONS  (PRN):    Vital Signs Last 24 Hrs  T(C): 36.6 (02 Dec 2018 12:58), Max: 36.6 (02 Dec 2018 12:58)  T(F): 97.9 (02 Dec 2018 12:58), Max: 97.9 (02 Dec 2018 12:58)  HR: 74 (02 Dec 2018 12:58) (70 - 82)  BP: 84/52 (02 Dec 2018 12:58) (84/52 - 118/68)  BP(mean): --  RR: 17 (02 Dec 2018 12:58) (16 - 17)  SpO2: 99% (02 Dec 2018 12:58) (98% - 100%)  Daily     Daily     PHYSICAL EXAM:  Constitutional: He appears comfortable and not distressed. Not diaphoretic.    Neck:  The thyroid is normal. Trachea is midline.     Respiratory: The lungs are clear to auscultation. No dullness and expansion is normal.    Cardiovascular: S1 and S2 are normal. No mummurs, rubs or gallops are present.    Gastrointestinal: The abdomen is distended with shifting dullness. No tenderness is present. No masses are present.     Genitourinary: The bladder is not distended. No CVA tenderness is present.    Extremities: No edema is noted. No deformities are present.    Neurological: Very poor memory. Tone, power and sensation are normal.    Skin: No lesions are seen  or palpated.                            10.5   8.22  )-----------( 137      ( 02 Dec 2018 05:45 )             34.0     12-02    141  |  98  |  15  ----------------------------<  68<L>  3.7   |  27  |  4.59<H>    Ca    9.5      02 Dec 2018 05:45  Phos  3.1     12-02  Mg     2.3     12-02    TPro  6.8  /  Alb  3.2<L>  /  TBili  0.9  /  DBili  x   /  AST  34  /  ALT  18  /  AlkPhos  223<H>  12-02
[facility]   MD JYOTI FAYE MD ANGELA WONG, [place]    TEL:  OFFICE: [phone]  DR ARMIJO CELL: [phone]  DR. VALDEZ CELL: [phone]  LATANYA LEGGETT CELL: [phone]        Patient is a 68y old  Male who presents with a chief complaint of Abdominal distension (04 Dec 2018 13:52)      Patient seen and examined at bedside. No chest pain/sob    VITALS:  T(F): 98.3 (12-04-18 @ 13:13), Max: 98.3 (12-04-18 @ 13:13)  HR: 77 (12-04-18 @ 13:13)  BP: 116/68 (12-04-18 @ 13:13)  RR: 17 (12-04-18 @ 13:13)  SpO2: 97% (12-04-18 @ 13:13)  Wt(kg): --        PHYSICAL EXAM:  Constitutional: NAD  Neck: No JVD  Respiratory: CTAB, no wheezes, rales or rhonchi  Cardiovascular: S1, S2, RRR  Gastrointestinal: BS+, distended, NT  Extremities: No peripheral edema    Hospital Medications:   MEDICATIONS  (STANDING):  atorvastatin 80 milliGRAM(s) Oral at bedtime  chlorhexidine 4% Liquid 1 Application(s) Topical two times a day  clopidogrel Tablet 75 milliGRAM(s) Oral daily  epoetin shakira Injectable 61193 Unit(s) IV Push <User Schedule>  famotidine    Tablet 20 milliGRAM(s) Oral daily  levETIRAcetam 500 milliGRAM(s) Oral daily  lidocaine 1% Injectable 20 milliLiter(s) Local Injection once  midodrine 5 milliGRAM(s) Oral <User Schedule>      LABS:  12-03    140  |  99  |  17  ----------------------------<  78  4.0   |  25  |  5.54<H>    Ca    9.4      03 Dec 2018 06:30  Phos  3.4     12-03  Mg     2.2     12-03    TPro  6.4  /  Alb  3.0<L>  /  TBili  0.7  /  DBili      /  AST  32  /  ALT  18  /  AlkPhos  203<H>  12-03    Creatinine Trend: 5.54 <--, 4.59 <--, 6.01 <--, 4.89 <--, 5.93 <--, 4.63 <--                                10.7   8.09  )-----------( 142      ( 03 Dec 2018 06:30 )             34.5     Urine Studies:      Iron 67, TIBC 177, %sat --      [11-29-18 @ 06:00]  Ferritin 1826      [11-29-18 @ 06:00]  PTH 71.24 (Ca --)      [11-27-18 @ 21:10]   --  HbA1c 5.4      [08-23-17 @ 08:08]    HBsAb >1000.0      [11-27-18 @ 21:10]  HBsAg Nonreactive      [11-29-18 @ 06:00]  HBcAb Nonreactive      [11-27-18 @ 21:10]  HCV 0.13, Nonreactive Hepatitis C AB  S/CO Ratio                        Interpretation  < 1.0                                     Non-Reactive  1.0 - 4.9                           Weakly-Reactive  > 5.0                                 Reactive  Non-Reactive: Aperson with a non-reactive HCV antibody  result is considered uninfected.  No further action is  needed unless recent infection is suspected.  In these  cases, consider repeat testing later to detect  seroconversion..  Weakly-Reactive: HCV antibody test is abnormal, HCV RNA  Qualitative test will follow.  Reactive: HCV antibody test is abnormal, HCV RNA  Qualitative test will follow.  Note: HCV antibody testing is performed on the Abbott   system.      [11-29-18 @ 06:00]  HIV Nonreactive The HIV Ag/Ab Combo test performed screens for HIV-1 p24  antigen, antibodies to HIV-1 (group M and group O), and  antibodies to HIV-2. All specimens repeatedly reactive  will reflex to an HIV 1/2 antibody confirmation and  differentiation test. This assay detects p24 antigen which  may be present prior to the development of HIV antibodies,  therefore a reactive result with a negative HIV 1/2 AB  Confirmation should be followed up with HIV-1 RNA, HIV-2  RNA and repeat testing in 4-8 weeks. A nonreactive result  does not preclude previous exposure to or infection with  HIV-1 or HIV-2. [facility] prohibits disclosure of this  result to any unauthorized party.      [12-02-18 @ 05:45]      RADIOLOGY & ADDITIONAL STUDIES:
[facility]   MD JYOTI FAYE MD ANGELA WONG, PA    TEL:  OFFICE: [phone]  DR ARMIJO CELL: [phone]  DR. VALDEZ CELL: [phone]  LATANYA LEGGETT CELL: [phone]        Patient is a 68y old  Male who presents with a chief complaint of Abdominal distension (06 Dec 2018 13:20)      Patient seen and examined at bedside. No chest pain/sob    VITALS:  T(F): 97.5 (12-06-18 @ 13:06), Max: 98.1 (12-05-18 @ 21:12)  HR: 62 (12-06-18 @ 13:49)  BP: 99/54 (12-06-18 @ 13:49)  RR: 18 (12-06-18 @ 13:06)  SpO2: 100% (12-06-18 @ 13:06)  Wt(kg): --        PHYSICAL EXAM:  Constitutional: NAD  Neck: No JVD  Respiratory: CTAB, no wheezes, rales or rhonchi  Cardiovascular: S1, S2, RRR  Gastrointestinal: BS+, soft, distended  Extremities: No peripheral edema    Hospital Medications:   MEDICATIONS  (STANDING):  atorvastatin 80 milliGRAM(s) Oral at bedtime  chlorhexidine 4% Liquid 1 Application(s) Topical two times a day  epoetin shakira Injectable 37105 Unit(s) IV Push <User Schedule>  famotidine    Tablet 20 milliGRAM(s) Oral daily  levETIRAcetam 500 milliGRAM(s) Oral daily  lidocaine 1% Injectable 20 milliLiter(s) Local Injection once  midodrine 5 milliGRAM(s) Oral <User Schedule>      LABS:  12-06    142  |  99  |  20  ----------------------------<  93  3.6   |  29  |  5.94<H>    Ca    9.2      06 Dec 2018 06:30  Mg     2.2     12-06      Creatinine Trend: 5.94 <--, 4.71 <--, 6.10 <--, 5.54 <--, 4.59 <--, 6.01 <--, 4.89 <--                                11.2   9.31  )-----------( 136      ( 06 Dec 2018 06:30 )             35.7     Urine Studies:      Iron 67, TIBC 177, %sat --      [11-29-18 @ 06:00]  Ferritin 1826      [11-29-18 @ 06:00]  PTH 71.24 (Ca --)      [11-27-18 @ 21:10]   --  HbA1c 5.4      [08-23-17 @ 08:08]    HBsAb >1000.0      [11-27-18 @ 21:10]  HBsAg Nonreactive      [11-29-18 @ 06:00]  HBcAb Nonreactive      [11-27-18 @ 21:10]  HCV 0.13, Nonreactive Hepatitis C AB  S/CO Ratio                        Interpretation  < 1.0                                     Non-Reactive  1.0 - 4.9                           Weakly-Reactive  > 5.0                                 Reactive  Non-Reactive: Aperson with a non-reactive HCV antibody  result is considered uninfected.  No further action is  needed unless recent infection is suspected.  In these  cases, consider repeat testing later to detect  seroconversion..  Weakly-Reactive: HCV antibody test is abnormal, HCV RNA  Qualitative test will follow.  Reactive: HCV antibody test is abnormal, HCV RNA  Qualitative test will follow.  Note: HCV antibody testing is performed on the Abbott   system.      [11-29-18 @ 06:00]  HIV Nonreactive The HIV Ag/Ab Combo test performed screens for HIV-1 p24  antigen, antibodies to HIV-1 (group M and group O), and  antibodies to HIV-2. All specimens repeatedly reactive  will reflex to an HIV 1/2 antibody confirmation and  differentiation test. This assay detects p24 antigen which  may be present prior to the development of HIV antibodies,  therefore a reactive result with a negative HIV 1/2 AB  Confirmation should be followed up with HIV-1 RNA, HIV-2  RNA and repeat testing in 4-8 weeks. A nonreactive result  does not preclude previous exposure to or infection with  HIV-1 or HIV-2. [facility] prohibits disclosure of this  result to any unauthorized party.      [12-02-18 @ 05:45]      RADIOLOGY & ADDITIONAL STUDIES:
Neurology Consult    Patient is a 68y old  Male who presents with a chief complaint of Abdominal distension (30 Nov 2018 11:41)      HPI:  69 yo M hx of ESRD on HD ( T W TH) DM HTN here for abdominal pain and distension during dialysis today. The patient is reported to have altered mental status.  He is not reliable to provide neurologic history or symptomology.  Reported to have standing cognitive impairment however recently more agitated.  In house I have not appreciated any aggressive behaviour during interviews.  Pt has been found to have an acute lesion in left temporal lobe concerning for mass or hematoma, also noted to have areas of gliosis in bifrontorbital frontal lobes R>L, likely old trauma, and R PCA territory, likely prior CVA.        PAST MEDICAL & SURGICAL HISTORY:  ESRD (end stage renal disease) on dialysis  Type 2 diabetes mellitus with stage 4 chronic kidney disease, unspecified long term insulin use status  Essential hypertension  ESRD (end stage renal disease) on dialysis  No significant past surgical history      Allergies    No Known Allergies    Intolerances        MEDICATIONS  (STANDING):  atorvastatin 80 milliGRAM(s) Oral at bedtime  chlorhexidine 4% Liquid 1 Application(s) Topical two times a day  epoetin shakira Injectable 98886 Unit(s) IV Push <User Schedule>  famotidine    Tablet 20 milliGRAM(s) Oral daily  furosemide    Tablet 40 milliGRAM(s) Oral four times a day  isosorbide   mononitrate ER Tablet (IMDUR) 120 milliGRAM(s) Oral daily  lisinopril 20 milliGRAM(s) Oral daily  midodrine 5 milliGRAM(s) Oral <User Schedule>    MEDICATIONS  (PRN):      Social History: Denies tob, EtOH use     FAMILY HISTORY:          Physical Exam:   Vital Signs Last 24 Hrs  T(C): 36.3 (01 Dec 2018 09:34), Max: 36.7 (01 Dec 2018 05:37)  T(F): 97.4 (01 Dec 2018 09:34), Max: 98.1 (01 Dec 2018 05:37)  HR: 68 (01 Dec 2018 09:34) (66 - 72)  BP: 119/72 (01 Dec 2018 09:34) (101/47 - 119/72)  BP(mean): --  RR: 18 (01 Dec 2018 09:34) (17 - 18)  SpO2: 98% (01 Dec 2018 05:37) (98% - 99%)  General Exam:   General appearance: No acute distress                   Neurological Exam:  Mental Status: Patient is awake and alert to himself, he does follow commands however requires multiple prompts and some commands are not followed.  He can name glasses, pen, gloves however when transitioning he perseverates on previous item.  He is slow and makes errors when asked to repeat a sentence.  Limited spontaneous language production.    Cranial Nerves: EOMI, PERRL, VFF, V1-V3 intact, face symmetric, no dysarthria, tongue midline    Motor: left upper extremity drift.  There is atrophy throughout which is not asymmetric  Grossly 5/5 however I was unable to test specific muscle groups as patient did not follow all commands    Sensation:   Patient responds equally to pp however switches right and left, no neglect.    Labs:     CBC Full  -  ( 30 Nov 2018 06:30 )  WBC Count : 9.10 K/uL  Hemoglobin : 10.5 g/dL  Hematocrit : 33.2 %  Platelet Count - Automated : 143 K/uL  Mean Cell Volume : 93.0 fL  Mean Cell Hemoglobin : 29.4 pg  Mean Cell Hemoglobin Concentration : 31.6 %  Auto Neutrophil # : x  Auto Lymphocyte # : x  Auto Monocyte # : x  Auto Eosinophil # : x  Auto Basophil # : x  Auto Neutrophil % : x  Auto Lymphocyte % : x  Auto Monocyte % : x  Auto Eosinophil % : x  Auto Basophil % : x    11-30    138  |  97<L>  |  19  ----------------------------<  84  3.7   |  25  |  4.89<H>    Ca    9.3      30 Nov 2018 06:30  Phos  3.1     11-30  Mg     2.3     11-30    TPro  6.6  /  Alb  3.0<L>  /  TBili  0.7  /  DBili  x   /  AST  30  /  ALT  13  /  AlkPhos  235<H>  11-30    LIVER FUNCTIONS - ( 30 Nov 2018 06:30 )  Alb: 3.0 g/dL / Pro: 6.6 g/dL / ALK PHOS: 235 u/L / ALT: 13 u/L / AST: 30 u/L / GGT: x           PT/INR - ( 29 Nov 2018 06:00 )   PT: 11.6 SEC;   INR: 1.02          PTT - ( 29 Nov 2018 06:00 )  PTT:21.2 SEC      Radiology:
[facility] [place] [facility]   MD JYOTI FAYE MD RUORU WONG, PA    TEL:  OFFICE: [phone]  DR ARMIJO CELL: [phone]  BARBY LEGGETT CELL: [phone]  DR. VALDEZ CELL: [phone]    RENAL FOLLOW UP NOTE  --------------------------------------------------------------------------------  HPI:      Pt seen and examined at bedside.   Kimber SOB, chest pain     PAST HISTORY  --------------------------------------------------------------------------------  No significant changes to PMH, PSH, FHx, SHx, unless otherwise noted    ALLERGIES & MEDICATIONS  --------------------------------------------------------------------------------  Allergies    No Known Allergies    Intolerances      Standing Inpatient Medications  atorvastatin 80 milliGRAM(s) Oral at bedtime  chlorhexidine 4% Liquid 1 Application(s) Topical two times a day  epoetin shakira Injectable 98754 Unit(s) IV Push <User Schedule>  famotidine    Tablet 20 milliGRAM(s) Oral daily  furosemide    Tablet 40 milliGRAM(s) Oral four times a day  isosorbide   mononitrate ER Tablet (IMDUR) 120 milliGRAM(s) Oral daily  lisinopril 20 milliGRAM(s) Oral daily  midodrine 5 milliGRAM(s) Oral <User Schedule>    PRN Inpatient Medications      REVIEW OF SYSTEMS  --------------------------------------------------------------------------------  General: no fever  CVS: no chest pain  RESP: no sob, no cough        VITALS/PHYSICAL EXAM  --------------------------------------------------------------------------------  T(C): 37.1 (11-30-18 @ 05:17), Max: 37.1 (11-29-18 @ 22:07)  HR: 70 (11-30-18 @ 05:17) (68 - 76)  BP: 120/75 (11-30-18 @ 05:17) (97/60 - 124/66)  RR: 18 (11-30-18 @ 05:17) (17 - 18)  SpO2: 99% (11-30-18 @ 05:17) (97% - 99%)  Wt(kg): --        11-29-18 @ 07:01  -  11-30-18 @ 07:00  --------------------------------------------------------  IN: 600 mL / OUT: 1700 mL / NET: -1100 mL      Physical Exam:  	Gen: NAD  	HEENT: MMM  	Pulm: CTA B/L  	CV: S1S2  	Abd: Soft, +BS  	Ext: No LE edema B/L                      Neuro: Awake   	Skin: Warm and Dry   	Vascular access: AVF    LABS/STUDIES  --------------------------------------------------------------------------------              10.5   9.10  >-----------<  143      [11-30-18 @ 06:30]              33.2     138  |  97  |  19  ----------------------------<  84      [11-30-18 @ 06:30]  3.7   |  25  |  4.89        Ca     9.3     [11-30-18 @ 06:30]      Mg     2.3     [11-30-18 @ 06:30]      Phos  3.1     [11-30-18 @ 06:30]    TPro  6.6  /  Alb  3.0  /  TBili  0.7  /  DBili  x   /  AST  30  /  ALT  13  /  AlkPhos  235  [11-30-18 @ 06:30]    PT/INR: PT 11.6 , INR 1.02       [11-29-18 @ 06:00]  PTT: 21.2       [11-29-18 @ 06:00]          [11-29-18 @ 06:00]    Creatinine Trend:  SCr 4.89 [11-30 @ 06:30]  SCr 5.93 [11-29 @ 06:00]  SCr 4.63 [11-28 @ 04:25]  SCr 6.44 [11-27 @ 12:26]        Iron 67, TIBC 177, %sat --      [11-29-18 @ 06:00]  Ferritin 1826      [11-29-18 @ 06:00]  PTH 71.24 (Ca --)      [11-27-18 @ 21:10]   --  HbA1c 5.4      [08-23-17 @ 08:08]    HBsAb >1000.0      [11-27-18 @ 21:10]  HBsAg Nonreactive      [11-29-18 @ 06:00]  HBcAb Nonreactive      [11-27-18 @ 21:10]  HCV 0.13, Nonreactive Hepatitis C AB  S/CO Ratio                        Interpretation  < 1.0                                     Non-Reactive  1.0 - 4.9                           Weakly-Reactive  > 5.0                                 Reactive  Non-Reactive: Aperson with a non-reactive HCV antibody  result is considered uninfected.  No further action is  needed unless recent infection is suspected.  In these  cases, consider repeat testing later to detect  seroconversion..  Weakly-Reactive: HCV antibody test is abnormal, HCV RNA  Qualitative test will follow.  Reactive: HCV antibody test is abnormal, HCV RNA  Qualitative test will follow.  Note: HCV antibody testing is performed on the Abbott   system.      [11-29-18 @ 06:00]
Patient is a 68y old  Male who presents with a chief complaint of Abdominal distension (02 Dec 2018 11:29)      SUBJECTIVE / OVERNIGHT EVENTS:  Dizzy while sitting, BP dropped to 85/52. No SOB  Review of Systems:   CONSTITUTIONAL: No fever, weight loss, or fatigue  EYES: No eye pain, visual disturbances, or discharge  ENMT:  No difficulty hearing, tinnitus, vertigo; No sinus or throat pain  NECK: No pain or stiffness  BREASTS: No pain, masses, or nipple discharge  RESPIRATORY: No cough, wheezing, chills or hemoptysis; No shortness of breath  CARDIOVASCULAR: No chest pain, palpitations, dizziness, or leg swelling  GASTROINTESTINAL: No abdominal or epigastric pain. No nausea, vomiting, or hematemesis; No diarrhea or constipation. No melena or hematochezia.  GENITOURINARY: No dysuria, frequency, hematuria, or incontinence  NEUROLOGICAL: No headaches, memory loss, loss of strength, numbness, or tremors  SKIN: No itching, burning, rashes, or lesions   LYMPH NODES: No enlarged glands  ENDOCRINE: No heat or cold intolerance; No hair loss  MUSCULOSKELETAL: No joint pain or swelling; No muscle, back, or extremity pain  PSYCHIATRIC: No depression, anxiety, mood swings, or difficulty sleeping  HEME/LYMPH: No easy bruising, or bleeding gums  ALLERY AND IMMUNOLOGIC: No hives or eczema    MEDICATIONS  (STANDING):  atorvastatin 80 milliGRAM(s) Oral at bedtime  chlorhexidine 4% Liquid 1 Application(s) Topical two times a day  epoetin shakira Injectable 30630 Unit(s) IV Push <User Schedule>  famotidine    Tablet 20 milliGRAM(s) Oral daily  furosemide    Tablet 40 milliGRAM(s) Oral two times a day  isosorbide   mononitrate ER Tablet (IMDUR) 120 milliGRAM(s) Oral daily  levETIRAcetam 500 milliGRAM(s) Oral daily  lisinopril 20 milliGRAM(s) Oral daily  midodrine 5 milliGRAM(s) Oral <User Schedule>    MEDICATIONS  (PRN):      PHYSICAL EXAM:  Vital Signs Last 24 Hrs  T(C): 36.6 (02 Dec 2018 12:58), Max: 36.6 (02 Dec 2018 12:58)  T(F): 97.9 (02 Dec 2018 12:58), Max: 97.9 (02 Dec 2018 12:58)  HR: 74 (02 Dec 2018 12:58) (70 - 82)  BP: 84/52 (02 Dec 2018 12:58) (84/52 - 118/68)  BP(mean): --  RR: 17 (02 Dec 2018 12:58) (16 - 17)  SpO2: 99% (02 Dec 2018 12:58) (98% - 100%)  I&O's Summary    01 Dec 2018 07:01  -  02 Dec 2018 07:00  --------------------------------------------------------  IN: 500 mL / OUT: 1662 mL / NET: -1162 mL      GENERAL: NAD, well-developed  HEAD:  Atraumatic, Normocephalic  EYES: EOMI, PERRLA, conjunctiva and sclera clear  NECK: Supple, No JVD  CHEST/LUNG: Clear to auscultation bilaterally; No wheeze  HEART: Regular rate and rhythm; No murmurs, rubs, or gallops  ABDOMEN: Soft, Nontender, Nondistended; Bowel sounds present  EXTREMITIES:  2+ Peripheral Pulses, No clubbing, cyanosis, or edema  PSYCH: AAOx1, confused  NEUROLOGY: non-focal  SKIN: No rashes or lesions    LABS:  CAPILLARY BLOOD GLUCOSE      POCT Blood Glucose.: 85 mg/dL (01 Dec 2018 22:02)  POCT Blood Glucose.: 106 mg/dL (01 Dec 2018 16:48)                          10.5   8.22  )-----------( 137      ( 02 Dec 2018 05:45 )             34.0     12-02    141  |  98  |  15  ----------------------------<  68<L>  3.7   |  27  |  4.59<H>    Ca    9.5      02 Dec 2018 05:45  Phos  3.1     12-02  Mg     2.3     12-02    TPro  6.8  /  Alb  3.2<L>  /  TBili  0.9  /  DBili  x   /  AST  34  /  ALT  18  /  AlkPhos  223<H>  12-02              RADIOLOGY & ADDITIONAL TESTS:    Imaging Personally Reviewed:    Consultant(s) Notes Reviewed:      Care Discussed with Consultants/Other Providers:
Patient is a 68y old  Male who presents with a chief complaint of Abdominal distension (03 Dec 2018 11:38)      SUBJECTIVE / OVERNIGHT EVENTS:  Calm, confused. s/p abd paracentesis. No SOB  Review of Systems:   CONSTITUTIONAL: No fever, weight loss, or fatigue  EYES: No eye pain, visual disturbances, or discharge  ENMT:  No difficulty hearing, tinnitus, vertigo; No sinus or throat pain  NECK: No pain or stiffness  BREASTS: No pain, masses, or nipple discharge  RESPIRATORY: No cough, wheezing, chills or hemoptysis; No shortness of breath  CARDIOVASCULAR: No chest pain, palpitations, dizziness, or leg swelling  GASTROINTESTINAL: No abdominal or epigastric pain. No nausea, vomiting, or hematemesis; No diarrhea or constipation. No melena or hematochezia.  GENITOURINARY: No dysuria, frequency, hematuria, or incontinence  NEUROLOGICAL: No headaches, memory loss, loss of strength, numbness, or tremors  SKIN: No itching, burning, rashes, or lesions   LYMPH NODES: No enlarged glands  ENDOCRINE: No heat or cold intolerance; No hair loss  MUSCULOSKELETAL: No joint pain or swelling; No muscle, back, or extremity pain  PSYCHIATRIC: No depression, anxiety, mood swings, or difficulty sleeping  HEME/LYMPH: No easy bruising, or bleeding gums  ALLERY AND IMMUNOLOGIC: No hives or eczema    MEDICATIONS  (STANDING):  albumin human 25% IVPB 42 Gram(s) IV Intermittent once  atorvastatin 80 milliGRAM(s) Oral at bedtime  chlorhexidine 4% Liquid 1 Application(s) Topical two times a day  epoetin shakira Injectable 29709 Unit(s) IV Push <User Schedule>  famotidine    Tablet 20 milliGRAM(s) Oral daily  levETIRAcetam 500 milliGRAM(s) Oral daily  lidocaine 1% Injectable 20 milliLiter(s) Local Injection once  midodrine 5 milliGRAM(s) Oral <User Schedule>    MEDICATIONS  (PRN):      PHYSICAL EXAM:  Vital Signs Last 24 Hrs  T(C): 36.7 (03 Dec 2018 06:04), Max: 36.7 (03 Dec 2018 06:04)  T(F): 98 (03 Dec 2018 06:04), Max: 98 (03 Dec 2018 06:04)  HR: 72 (03 Dec 2018 06:04) (40 - 74)  BP: 108/63 (03 Dec 2018 06:04) (68/40 - 115/60)  BP(mean): --  RR: 17 (03 Dec 2018 06:04) (16 - 17)  SpO2: 99% (03 Dec 2018 06:04) (98% - 100%)  I&O's Summary    GENERAL: NAD, well-developed  HEAD:  Atraumatic, Normocephalic  EYES: EOMI, PERRLA, conjunctiva and sclera clear  NECK: Supple, No JVD  CHEST/LUNG: Clear to auscultation bilaterally; No wheeze  HEART: Regular rate and rhythm; No murmurs, rubs, or gallops  ABDOMEN: Soft, Nontender, Nondistended; Bowel sounds present  EXTREMITIES:  2+ Peripheral Pulses, No clubbing, cyanosis, or edema  PSYCH: AAOx3  NEUROLOGY: non-focal  SKIN: No rashes or lesions    LABS:  CAPILLARY BLOOD GLUCOSE      POCT Blood Glucose.: 85 mg/dL (03 Dec 2018 07:56)  POCT Blood Glucose.: 91 mg/dL (03 Dec 2018 06:33)  POCT Blood Glucose.: 101 mg/dL (03 Dec 2018 02:21)  POCT Blood Glucose.: 119 mg/dL (02 Dec 2018 14:59)                          10.7   8.09  )-----------( 142      ( 03 Dec 2018 06:30 )             34.5     12-03    140  |  99  |  17  ----------------------------<  78  4.0   |  25  |  5.54<H>    Ca    9.4      03 Dec 2018 06:30  Phos  3.4     12-03  Mg     2.2     12-03    TPro  6.4  /  Alb  3.0<L>  /  TBili  0.7  /  DBili  x   /  AST  32  /  ALT  18  /  AlkPhos  203<H>  12-03              RADIOLOGY & ADDITIONAL TESTS:    Imaging Personally Reviewed:    Consultant(s) Notes Reviewed:      Care Discussed with Consultants/Other Providers:
Patient is a 68y old  Male who presents with a chief complaint of Abdominal distension (03 Dec 2018 13:01)      Any change in ROS: Pt islying down in bed: confused: in no resp distress    MEDICATIONS  (STANDING):  atorvastatin 80 milliGRAM(s) Oral at bedtime  chlorhexidine 4% Liquid 1 Application(s) Topical two times a day  clopidogrel Tablet 75 milliGRAM(s) Oral daily  epoetin shakira Injectable 26264 Unit(s) IV Push <User Schedule>  famotidine    Tablet 20 milliGRAM(s) Oral daily  levETIRAcetam 500 milliGRAM(s) Oral daily  lidocaine 1% Injectable 20 milliLiter(s) Local Injection once  midodrine 5 milliGRAM(s) Oral <User Schedule>    MEDICATIONS  (PRN):    Vital Signs Last 24 Hrs  T(C): 36.3 (04 Dec 2018 11:35), Max: 36.8 (03 Dec 2018 12:58)  T(F): 97.4 (04 Dec 2018 11:35), Max: 98.3 (03 Dec 2018 12:58)  HR: 62 (04 Dec 2018 11:35) (60 - 65)  BP: 108/71 (04 Dec 2018 11:35) (103/63 - 120/99)  BP(mean): --  RR: 17 (04 Dec 2018 11:35) (17 - 18)  SpO2: 98% (04 Dec 2018 11:35) (97% - 98%)    I&O's Summary        Physical Exam:   GENERAL: NAD, well-groomed, well-developed  HEENT: KAREN/   Atraumatic, Normocephalic  ENMT: No tonsillar erythema, exudates, or enlargement; Moist mucous membranes, Good dentition, No lesions  NECK: Supple, No JVD, Normal thyroid  CHEST/LUNG: Decreased air entry rigth side   CVS: Regular rate and rhythm; No murmurs, rubs, or gallops  GI: : Soft, Nontender, Nondistended; Bowel sounds present  NERVOUS SYSTEM:  Alert & Oriented X1  EXTREMITIES:  2+ Peripheral Pulses, No clubbing, cyanosis, or edema  LYMPH: No lymphadenopathy noted  SKIN: No rashes or lesions  ENDOCRINOLOGY: No Thyromegaly  PSYCH: Confised    Labs:                              10.7   8.09  )-----------( 142      ( 03 Dec 2018 06:30 )             34.5                         10.5   8.22  )-----------( 137      ( 02 Dec 2018 05:45 )             34.0                         10.1   8.17  )-----------( 134      ( 01 Dec 2018 06:15 )             30.6     12-03    140  |  99  |  17  ----------------------------<  78  4.0   |  25  |  5.54<H>  12-02    141  |  98  |  15  ----------------------------<  68<L>  3.7   |  27  |  4.59<H>  12-01    137  |  96<L>  |  23  ----------------------------<  86  3.4<L>   |  26  |  6.01<H>    Ca    9.4      03 Dec 2018 06:30  Phos  3.4     12-03  Mg     2.2     12-03    TPro  6.4  /  Alb  3.0<L>  /  TBili  0.7  /  DBili  x   /  AST  32  /  ALT  18  /  AlkPhos  203<H>  12-03  TPro  6.8  /  Alb  3.2<L>  /  TBili  0.9  /  DBili  x   /  AST  34  /  ALT  18  /  AlkPhos  223<H>  12-02  TPro  6.3  /  Alb  2.8<L>  /  TBili  0.7  /  DBili  x   /  AST  29  /  ALT  13  /  AlkPhos  208<H>  12-01    CAPILLARY BLOOD GLUCOSE      POCT Blood Glucose.: 107 mg/dL (04 Dec 2018 07:41)  POCT Blood Glucose.: 97 mg/dL (03 Dec 2018 21:42)     (11-29 @ 12:25)    LIVER FUNCTIONS - ( 03 Dec 2018 06:30 )  Alb: 3.0 g/dL / Pro: 6.4 g/dL / ALK PHOS: 203 u/L / ALT: 18 u/L / AST: 32 u/L / GGT: x               Fluid Source --  Albumin, Fluid--  Glucose, Fluid--  Protein total, Fluid--  Lacatate Dehydrogenase, Fluid--  pH, Fluid--  Cytopathology-Non Gyn Report  ACCESSION No:  [id number]    MAGGIE BRANDON                     1        Cytopathology Report      < from: CT Chest No Cont (12.02.18 @ 09:40) >    LUNGS AND LARGE AIRWAYS: Debris is noted within the trachea and right   mainstem bronchus.  3 mm left upper lobe noncalcified pulmonary nodule.   (Series 2, image 30). 2 mm nodule within the right upper lobe (series 2,   image 27). 3 mm groundglass opacity noted within the left lower lobe   (series 2, image 71). Partial compressive atelectasis of the right lower   lobe.  PLEURA: Moderate right pleural effusion is partially loculated.  VESSELS: Atherosclerotic calcifications of the aorta.  HEART: Heart size is normal. Small pericardial effusion and/or pleural   thickening. Coronary artery calcifications. Aortic valvular   calcifications.  MEDIASTINUM AND JAIME: No lymphadenopathy.  CHEST WALL AND LOWER NECK: Within normal limits.  VISUALIZED UPPER ABDOMEN: Large visualized volume of ascites. Partially   visualized bilaterally atrophic kidneys.  BONES: Degenerative changes of the spine.    IMPRESSION:     Partially loculated moderate-sized right pleural effusion, small   pericardial effusion and/or pleural thickening, and partially visualized   large volume of ascites. Constrictive pericarditis is a consideration.   Echo recommended for complete evaluation.    Small lung nodules measure up to 3 mm. One-year follow-up CT recommended   if there are risk factors for malignancy.        < from: Transthoracic Echocardiogram (11.28.18 @ 19:28) >  rtic Root: Normal aortic root.  Aortic Valve: Aortic valve not well visualized; appears  calcified. Due to the presence of poor echocardiographic  windows and the presence of LVOT obstruction, aortic valve  gradients were unable to be obtained.  Minimal aortic  regurgitation.  Peak left ventricular outflow tract  gradient equals 21 mm Hg, consistent with mild LVOT  obstruction.  Left Atrium: Mildly dilated left atrium.  LA volume index =  38 cc/m2.  Left Ventricle: Hyperdynamic left ventricle. Normal left  ventricular internal dimensions and wall thicknesses.  Right Heart: Normal right atrium. The right ventricle is  not well visualized; grossly normal right ventricular  systolic function. Normal tricuspid valve. Mild tricuspid  regurgitation. Pulmonic valve not well visualized.  Pericardium/PleuraNormal pericardium with no pericardial  effusion.  Hemodynamic: Estimated right ventricular systolicpressure  equals 33 mm Hg, assuming right atrial pressure equals 10  mm Hg, consistent with normal pulmonary pressures. None  ------------------------------------------------------------------------  CONCLUSIONS:  1. Mitral annular calcification, otherwise normal mitral  valve.  No systolic anterior motion (DAFNE) of the mitral  valve. Mild mitral regurgitation.  2. Aortic valve not well visualized; appears calcified. Due  to the presence of poor echocardiographic windows and the  presence of LVOT obstruction, aortic valve gradients were  unable to be obtained.  Minimal aortic regurgitation.  3. Hyperdynamic left ventricle. Peak left ventricular  outflow tract gradient equals 21 mm Hg, consistent with  mild LVOT obstruction.  4. The right ventricle is not well visualized; grossly  normal right ventricular systolic function.  *** No previous Echo exam.  ------------------------------------------------------------------------  Confirmed on  11/28/2018 - 22:22:28 by Fred Mcclendon M.D.  ------------------------------------------------------------------------    < end of copied text >                DAISY ALVAREZ M.D., RADIOLOGY RESIDENT  This document has been electronically signed.    < end of copied text >        Specimen(s) Submitted  ASCITIC FLUID      Clinical History  68 years old male with ESRD, patient with ascites.      Gross Description  Received: 80  ml of yellow fluid in CytoLyt  Prepared: 1 ThinPrep slide, 1 cell block , 1 smear      Final Diagnosis  ASCITIC FLUID  NEGATIVE FOR MALIGNANT CELLS.  Cytology slides and cell block composed of benign mesothelial  cells and histiocytes in a background of  lymphocytes.    Screened by: Karrie SEO (ASCP)  Verified by: NOE Lau  (Electronic Signature)  Reported on: 11/30/18 13:32 EST, [address] [place]  [address]  Cytology technical processing performed at [address] [place] [address]  _________________________________________________________________  Fluid Source --  Albumin, Fluid1.9 g/dL  Glucose, Fluid64 mg/dL  Protein total, Fluid3.8 g/dL  Lacatate Dehydrogenase, [id number] U/L  pH, Fluid--  Cytopathology-Non Gyn Report--        RECENT CULTURES:  11-30 @ 14:55 BLOOD VENOUS                  NO ORGANISMS ISOLATED  NO ORGANISMS ISOLATED AT 72 HRS.  11-30 @ 14:51 BLOOD PERIPHERAL                  NO ORGANISMS ISOLATED  NO ORGANISMS ISOLATED AT 72 HRS.  11-29 @ 15:45 PERITONEAL FLUID                  11-29 @ 10:54 PERITONEAL FLUID       NOS^No Organisms Seen  WBC^White Blood Cells  QNTY CELLS IN GRAM STAIN: RARE (1+)             11-28 @ 02:22 NOSE                        RESPIRATORY CULTURES:          Studies  Chest X-RAY  CT SCAN Chest   Venous Dopplers: LE:   CT Abdomen  Others
Patient is a 68y old  Male who presents with a chief complaint of Abdominal distension (04 Dec 2018 16:16)      SUBJECTIVE / OVERNIGHT EVENTS:  No new symptoms  Review of Systems:   CONSTITUTIONAL: No fever, weight loss, or fatigue  EYES: No eye pain, visual disturbances, or discharge  ENMT:  No difficulty hearing, tinnitus, vertigo; No sinus or throat pain  NECK: No pain or stiffness  BREASTS: No pain, masses, or nipple discharge  RESPIRATORY: No cough, wheezing, chills or hemoptysis; No shortness of breath  CARDIOVASCULAR: No chest pain, palpitations, dizziness, or leg swelling  GASTROINTESTINAL: No abdominal or epigastric pain. No nausea, vomiting, or hematemesis; No diarrhea or constipation. No melena or hematochezia.  GENITOURINARY: No dysuria, frequency, hematuria, or incontinence  NEUROLOGICAL: No headaches, memory loss, loss of strength, numbness, or tremors  SKIN: No itching, burning, rashes, or lesions   LYMPH NODES: No enlarged glands  ENDOCRINE: No heat or cold intolerance; No hair loss  MUSCULOSKELETAL: No joint pain or swelling; No muscle, back, or extremity pain  PSYCHIATRIC: No depression, anxiety, mood swings, or difficulty sleeping  HEME/LYMPH: No easy bruising, or bleeding gums  ALLERY AND IMMUNOLOGIC: No hives or eczema    MEDICATIONS  (STANDING):  atorvastatin 80 milliGRAM(s) Oral at bedtime  chlorhexidine 4% Liquid 1 Application(s) Topical two times a day  epoetin shakira Injectable 31969 Unit(s) IV Push <User Schedule>  famotidine    Tablet 20 milliGRAM(s) Oral daily  levETIRAcetam 500 milliGRAM(s) Oral daily  lidocaine 1% Injectable 20 milliLiter(s) Local Injection once  midodrine 5 milliGRAM(s) Oral <User Schedule>    MEDICATIONS  (PRN):      PHYSICAL EXAM:  Vital Signs Last 24 Hrs  T(C): 36.8 (04 Dec 2018 13:13), Max: 36.8 (04 Dec 2018 05:20)  T(F): 98.3 (04 Dec 2018 13:13), Max: 98.3 (04 Dec 2018 13:13)  HR: 77 (04 Dec 2018 13:13) (60 - 77)  BP: 116/68 (04 Dec 2018 13:13) (108/71 - 120/99)  BP(mean): --  RR: 17 (04 Dec 2018 13:13) (17 - 17)  SpO2: 97% (04 Dec 2018 13:13) (97% - 98%)  I&O's Summary    GENERAL: NAD, well-developed  HEAD:  Atraumatic, Normocephalic  EYES: EOMI, PERRLA, conjunctiva and sclera clear  NECK: Supple, No JVD  CHEST/LUNG: Clear to auscultation bilaterally; No wheeze  HEART: Regular rate and rhythm; No murmurs, rubs, or gallops  ABDOMEN: Soft, Nontender, Nondistended; Bowel sounds present  EXTREMITIES:  2+ Peripheral Pulses, No clubbing, cyanosis, or edema  PSYCH: AAOx3  NEUROLOGY: non-focal  SKIN: No rashes or lesions    LABS:  CAPILLARY BLOOD GLUCOSE      POCT Blood Glucose.: 107 mg/dL (04 Dec 2018 07:41)  POCT Blood Glucose.: 97 mg/dL (03 Dec 2018 21:42)                          10.7   8.09  )-----------( 142      ( 03 Dec 2018 06:30 )             34.5     12-03    140  |  99  |  17  ----------------------------<  78  4.0   |  25  |  5.54<H>    Ca    9.4      03 Dec 2018 06:30  Phos  3.4     12-03  Mg     2.2     12-03    TPro  6.4  /  Alb  3.0<L>  /  TBili  0.7  /  DBili  x   /  AST  32  /  ALT  18  /  AlkPhos  203<H>  12-03              RADIOLOGY & ADDITIONAL TESTS:    Imaging Personally Reviewed:  < from: MR Head No Cont (12.04.18 @ 15:22) >  Impression: Parenchymal and subarachnoid hemorrhage suspected involving   the left temporal region. Clinical correlation and continued close   interval follow-up until resolution is recommended.    Areas of encephalomalacia and gliosis asdescribed above.    < end of copied text >    Consultant(s) Notes Reviewed:      Care Discussed with Consultants/Other Providers:
Patient is a 68y old  Male who presents with a chief complaint of Abdominal distension (05 Dec 2018 17:05)      SUBJECTIVE / OVERNIGHT EVENTS:  No new symptoms  Review of Systems:   CONSTITUTIONAL: No fever, weight loss, or fatigue  EYES: No eye pain, visual disturbances, or discharge  ENMT:  No difficulty hearing, tinnitus, vertigo; No sinus or throat pain  NECK: No pain or stiffness  BREASTS: No pain, masses, or nipple discharge  RESPIRATORY: No cough, wheezing, chills or hemoptysis; No shortness of breath  CARDIOVASCULAR: No chest pain, palpitations, dizziness, or leg swelling  GASTROINTESTINAL: No abdominal or epigastric pain. No nausea, vomiting, or hematemesis; No diarrhea or constipation. No melena or hematochezia.  GENITOURINARY: No dysuria, frequency, hematuria, or incontinence  NEUROLOGICAL: No headaches, memory loss, loss of strength, numbness, or tremors  SKIN: No itching, burning, rashes, or lesions   LYMPH NODES: No enlarged glands  ENDOCRINE: No heat or cold intolerance; No hair loss  MUSCULOSKELETAL: No joint pain or swelling; No muscle, back, or extremity pain  PSYCHIATRIC: No depression, anxiety, mood swings, or difficulty sleeping  HEME/LYMPH: No easy bruising, or bleeding gums  ALLERY AND IMMUNOLOGIC: No hives or eczema    MEDICATIONS  (STANDING):  atorvastatin 80 milliGRAM(s) Oral at bedtime  chlorhexidine 4% Liquid 1 Application(s) Topical two times a day  epoetin shakira Injectable 91849 Unit(s) IV Push <User Schedule>  famotidine    Tablet 20 milliGRAM(s) Oral daily  levETIRAcetam 500 milliGRAM(s) Oral daily  lidocaine 1% Injectable 20 milliLiter(s) Local Injection once  midodrine 5 milliGRAM(s) Oral <User Schedule>    MEDICATIONS  (PRN):      PHYSICAL EXAM:  Vital Signs Last 24 Hrs  T(C): 36.3 (05 Dec 2018 12:49), Max: 36.5 (05 Dec 2018 06:21)  T(F): 97.4 (05 Dec 2018 12:49), Max: 97.7 (05 Dec 2018 06:21)  HR: 68 (05 Dec 2018 12:44) (68 - 69)  BP: 100/54 (05 Dec 2018 12:44) (100/54 - 121/76)  BP(mean): --  RR: 17 (05 Dec 2018 12:49) (17 - 17)  SpO2: 100% (05 Dec 2018 12:49) (98% - 100%)  I&O's Summary    04 Dec 2018 07:01  -  05 Dec 2018 07:00  --------------------------------------------------------  IN: 600 mL / OUT: 1700 mL / NET: -1100 mL      GENERAL: NAD, well-developed  HEAD:  Atraumatic, Normocephalic  EYES: EOMI, PERRLA, conjunctiva and sclera clear  NECK: Supple, No JVD  CHEST/LUNG: Clear to auscultation bilaterally; No wheeze  HEART: Regular rate and rhythm; No murmurs, rubs, or gallops  ABDOMEN: Soft, Nontender, Nondistended; Bowel sounds present  EXTREMITIES:  2+ Peripheral Pulses, No clubbing, cyanosis, or edema  PSYCH: AAOx3  NEUROLOGY: non-focal  SKIN: No rashes or lesions    LABS:  CAPILLARY BLOOD GLUCOSE      POCT Blood Glucose.: 124 mg/dL (05 Dec 2018 17:18)  POCT Blood Glucose.: 81 mg/dL (05 Dec 2018 12:09)  POCT Blood Glucose.: 83 mg/dL (05 Dec 2018 07:50)  POCT Blood Glucose.: 88 mg/dL (04 Dec 2018 22:14)                          12.1   9.53  )-----------( 141      ( 05 Dec 2018 06:40 )             38.5     12-05    141  |  98  |  14  ----------------------------<  61<L>  3.6   |  28  |  4.71<H>    Ca    9.2      05 Dec 2018 06:40  Mg     2.0     12-05      PT/INR - ( 05 Dec 2018 06:40 )   PT: 12.9 SEC;   INR: 1.13          PTT - ( 05 Dec 2018 06:40 )  PTT:32.4 SEC          RADIOLOGY & ADDITIONAL TESTS:    Imaging Personally Reviewed:    Consultant(s) Notes Reviewed:      Care Discussed with Consultants/Other Providers:
Patient is a 68y old  Male who presents with a chief complaint of Abdominal distension (06 Dec 2018 12:40)      Any change in ROS: Alert and awake: confused , But not in any resp distress    MEDICATIONS  (STANDING):  atorvastatin 80 milliGRAM(s) Oral at bedtime  chlorhexidine 4% Liquid 1 Application(s) Topical two times a day  epoetin shakira Injectable 93456 Unit(s) IV Push <User Schedule>  famotidine    Tablet 20 milliGRAM(s) Oral daily  levETIRAcetam 500 milliGRAM(s) Oral daily  lidocaine 1% Injectable 20 milliLiter(s) Local Injection once  midodrine 5 milliGRAM(s) Oral <User Schedule>    MEDICATIONS  (PRN):    Vital Signs Last 24 Hrs  T(C): 36.4 (06 Dec 2018 13:06), Max: 36.7 (05 Dec 2018 21:12)  T(F): 97.5 (06 Dec 2018 13:06), Max: 98.1 (05 Dec 2018 21:12)  HR: 55 (06 Dec 2018 13:06) (55 - 64)  BP: 94/59 (06 Dec 2018 13:06) (92/56 - 99/57)  BP(mean): --  RR: 18 (06 Dec 2018 13:06) (16 - 18)  SpO2: 100% (06 Dec 2018 13:06) (99% - 100%)    I&O's Summary        Physical Exam:   GENERAL: NAD, well-groomed, well-developed  HEENT: KAREN/   Atraumatic, Normocephalic  ENMT: No tonsillar erythema, exudates, or enlargement; Moist mucous membranes, Good dentition, No lesions  NECK: Supple, No JVD, Normal thyroid  CHEST/LUNG: Decreased air entry right side  CVS: Regular rate and rhythm; No murmurs, rubs, or gallops  GI: : Soft, Nontender, Nondistended; Bowel sounds present  NERVOUS SYSTEM:  Alert & Oriented X1  EXTREMITIES:  2+ Peripheral Pulses, No clubbing, cyanosis, or edema  LYMPH: No lymphadenopathy noted  SKIN: No rashes or lesions  ENDOCRINOLOGY: No Thyromegaly  PSYCH: Confused    Labs:                              11.2   9.31  )-----------( 136      ( 06 Dec 2018 06:30 )             35.7                         12.1   9.53  )-----------( 141      ( 05 Dec 2018 06:40 )             38.5                         11.2   9.26  )-----------( 127      ( 04 Dec 2018 21:14 )             35.5                         10.7   8.09  )-----------( 142      ( 03 Dec 2018 06:30 )             34.5     12-06    142  |  99  |  20  ----------------------------<  93  3.6   |  29  |  5.94<H>  12-05    141  |  98  |  14  ----------------------------<  61<L>  3.6   |  28  |  4.71<H>  12-04    141  |  101  |  22  ----------------------------<  93  3.6   |  25  |  6.10<H>  12-03    140  |  99  |  17  ----------------------------<  78  4.0   |  25  |  5.54<H>    Ca    9.2      06 Dec 2018 06:30  Ca    9.2      05 Dec 2018 06:40  Ca    9.0      04 Dec 2018 21:14  Mg     2.2     12-06  Mg     2.0     12-05  Mg     2.1     12-04    TPro  6.4  /  Alb  3.0<L>  /  TBili  0.7  /  DBili  x   /  AST  32  /  ALT  18  /  AlkPhos  203<H>  12-03    CAPILLARY BLOOD GLUCOSE      POCT Blood Glucose.: 163 mg/dL (06 Dec 2018 11:50)  POCT Blood Glucose.: 96 mg/dL (06 Dec 2018 07:54)  POCT Blood Glucose.: 100 mg/dL (05 Dec 2018 21:36)  POCT Blood Glucose.: 124 mg/dL (05 Dec 2018 17:18)     (11-29 @ 12:25)      PT/INR - ( 05 Dec 2018 06:40 )   PT: 12.9 SEC;   INR: 1.13          PTT - ( 05 Dec 2018 06:40 )  PTT:32.4 SEC          RECENT CULTURES:  11-30 @ 14:55 BLOOD VENOUS                  NO ORGANISMS ISOLATED  11-30 @ 14:51 BLOOD PERIPHERAL     < from: MR Head No Cont (12.04.18 @ 15:22) >  ortex and sulci. This finding is suspicious for areas of subarachnoid   hemorrhage.    Abnormal areas of encephalomalacia and gliosis are seen involving the   bifrontal region which is likely result of old trauma.    Encephalomalacia and gliosis seen involving the right occipital region   which could be the result of old trauma or infarct.    Localized mass effect is seen. No significant shift or herniation is seen    The large vessels demonstrate normal flow voids    Inflammatory change involving both mastoids left greater than right.    Left maxillary polyp versus retention cysts in    Patient is status post bilateral cataract surgery.    Abnormal compression deformity is seen involving the C3 vertebral body.   This could be compatible osteoporotic compression fracture though   dedicated imaging of the cervical spine can be done for further   evaluation.    Impression: Parenchymal and subarachnoid hemorrhage suspected involving   the left temporal region. Clinical correlation and continued close   interval follow-up until resolution is recommended.    Areas of encephalomalacia and gliosis asdescribed above.    Findings discussed with Dr Knott on December 4, 2018 at 4:04 PM  with read   back.      < end of copied text >               NO ORGANISMS ISOLATED  11-29 @ 15:45 PERITONEAL FLUID       < from: CT Chest No Cont (12.02.18 @ 09:40) >  , image 71). Partial compressive atelectasis of the right lower   lobe.  PLEURA: Moderate right pleural effusion is partially loculated.  VESSELS: Atherosclerotic calcifications of the aorta.  HEART: Heart size is normal. Small pericardial effusion and/or pleural   thickening. Coronary artery calcifications. Aortic valvular   calcifications.  MEDIASTINUM AND JAIME: No lymphadenopathy.  CHEST WALL AND LOWER NECK: Within normal limits.  VISUALIZED UPPER ABDOMEN: Large visualized volume of ascites. Partially   visualized bilaterally atrophic kidneys.  BONES: Degenerative changes of the spine.    IMPRESSION:     Partially loculated moderate-sized right pleural effusion, small   pericardial effusion and/or pleural thickening, and partially visualized   large volume of ascites. Constrictive pericarditis is a consideration.   Echo recommended for complete evaluation.    Small lung nodules measure up to 3 mm. One-year follow-up CT recommended   if there are risk factors for malignancy.                      DAISY ALVAREZ M.D., RADIOLOGY RESIDENT  This document has been electronically signed.    < end of copied text >                   RESPIRATORY CULTURES:          Studies  Chest X-RAY  CT SCAN Chest   Venous Dopplers: LE:   CT Abdomen  Others
Patient is a 68y old  Male who presents with a chief complaint of Abdominal distension (06 Dec 2018 16:58)      SUBJECTIVE / OVERNIGHT EVENTS:  No new symptoms  Review of Systems:   CONSTITUTIONAL: No fever, weight loss, or fatigue  EYES: No eye pain, visual disturbances, or discharge  ENMT:  No difficulty hearing, tinnitus, vertigo; No sinus or throat pain  NECK: No pain or stiffness  BREASTS: No pain, masses, or nipple discharge  RESPIRATORY: No cough, wheezing, chills or hemoptysis; No shortness of breath  CARDIOVASCULAR: No chest pain, palpitations, dizziness, or leg swelling  GASTROINTESTINAL: No abdominal or epigastric pain. No nausea, vomiting, or hematemesis; No diarrhea or constipation. No melena or hematochezia.  GENITOURINARY: No dysuria, frequency, hematuria, or incontinence  NEUROLOGICAL: No headaches, memory loss, loss of strength, numbness, or tremors  SKIN: No itching, burning, rashes, or lesions   LYMPH NODES: No enlarged glands  ENDOCRINE: No heat or cold intolerance; No hair loss  MUSCULOSKELETAL: No joint pain or swelling; No muscle, back, or extremity pain  PSYCHIATRIC: No depression, anxiety, mood swings, or difficulty sleeping  HEME/LYMPH: No easy bruising, or bleeding gums  ALLERY AND IMMUNOLOGIC: No hives or eczema    MEDICATIONS  (STANDING):  atorvastatin 80 milliGRAM(s) Oral at bedtime  chlorhexidine 4% Liquid 1 Application(s) Topical two times a day  epoetin shakira Injectable 35916 Unit(s) IV Push <User Schedule>  famotidine    Tablet 20 milliGRAM(s) Oral daily  levETIRAcetam 500 milliGRAM(s) Oral daily  lidocaine 1% Injectable 20 milliLiter(s) Local Injection once  midodrine 5 milliGRAM(s) Oral <User Schedule>    MEDICATIONS  (PRN):      PHYSICAL EXAM:  Vital Signs Last 24 Hrs  T(C): 36.4 (06 Dec 2018 13:06), Max: 36.7 (05 Dec 2018 21:12)  T(F): 97.5 (06 Dec 2018 13:06), Max: 98.1 (05 Dec 2018 21:12)  HR: 62 (06 Dec 2018 13:49) (55 - 64)  BP: 99/54 (06 Dec 2018 13:49) (92/56 - 99/57)  BP(mean): --  RR: 18 (06 Dec 2018 13:06) (16 - 18)  SpO2: 100% (06 Dec 2018 13:06) (99% - 100%)  I&O's Summary    GENERAL: NAD, well-developed  HEAD:  Atraumatic, Normocephalic  EYES: EOMI, PERRLA, conjunctiva and sclera clear  NECK: Supple, No JVD  CHEST/LUNG: Clear to auscultation bilaterally; No wheeze  HEART: Regular rate and rhythm; No murmurs, rubs, or gallops  ABDOMEN: Soft, Nontender, Nondistended; Bowel sounds present  EXTREMITIES:  2+ Peripheral Pulses, No clubbing, cyanosis, or edema  PSYCH: AAOx3  NEUROLOGY: non-focal  SKIN: No rashes or lesions    LABS:  CAPILLARY BLOOD GLUCOSE      POCT Blood Glucose.: 115 mg/dL (06 Dec 2018 17:06)  POCT Blood Glucose.: 163 mg/dL (06 Dec 2018 11:50)  POCT Blood Glucose.: 96 mg/dL (06 Dec 2018 07:54)  POCT Blood Glucose.: 100 mg/dL (05 Dec 2018 21:36)                          11.2   9.31  )-----------( 136      ( 06 Dec 2018 06:30 )             35.7     12-06    142  |  99  |  20  ----------------------------<  93  3.6   |  29  |  5.94<H>    Ca    9.2      06 Dec 2018 06:30  Mg     2.2     12-06      PT/INR - ( 05 Dec 2018 06:40 )   PT: 12.9 SEC;   INR: 1.13          PTT - ( 05 Dec 2018 06:40 )  PTT:32.4 SEC          RADIOLOGY & ADDITIONAL TESTS:    Imaging Personally Reviewed:    Consultant(s) Notes Reviewed:      Care Discussed with Consultants/Other Providers:
Patient is a 68y old  Male who presents with a chief complaint of Abdominal distension (28 Nov 2018 14:26)      SUBJECTIVE / OVERNIGHT EVENTS:  Confused for a few days now, as per family. abdomen distended, no SOB at rest  Review of Systems:   CONSTITUTIONAL: No fever, weight loss, or fatigue  EYES: No eye pain, visual disturbances, or discharge  ENMT:  No difficulty hearing, tinnitus, vertigo; No sinus or throat pain  NECK: No pain or stiffness  BREASTS: No pain, masses, or nipple discharge  RESPIRATORY: No cough, wheezing, chills or hemoptysis; No shortness of breath  CARDIOVASCULAR: No chest pain, palpitations, dizziness, or leg swelling  GASTROINTESTINAL: No abdominal or epigastric pain. No nausea, vomiting, or hematemesis; No diarrhea or constipation. No melena or hematochezia.  GENITOURINARY: No dysuria, frequency, hematuria, or incontinence  NEUROLOGICAL: No headaches, memory loss, loss of strength, numbness, or tremors  SKIN: No itching, burning, rashes, or lesions   LYMPH NODES: No enlarged glands  ENDOCRINE: No heat or cold intolerance; No hair loss  MUSCULOSKELETAL: No joint pain or swelling; No muscle, back, or extremity pain  PSYCHIATRIC: No depression, anxiety, mood swings, or difficulty sleeping  HEME/LYMPH: No easy bruising, or bleeding gums  ALLERY AND IMMUNOLOGIC: No hives or eczema    MEDICATIONS  (STANDING):  atorvastatin 80 milliGRAM(s) Oral at bedtime  chlorhexidine 4% Liquid 1 Application(s) Topical two times a day  clopidogrel Tablet 75 milliGRAM(s) Oral daily  epoetin shakira Injectable 64725 Unit(s) IV Push <User Schedule>  famotidine    Tablet 20 milliGRAM(s) Oral daily  furosemide    Tablet 40 milliGRAM(s) Oral four times a day  isosorbide   mononitrate ER Tablet (IMDUR) 120 milliGRAM(s) Oral daily  lisinopril 20 milliGRAM(s) Oral daily  midodrine 5 milliGRAM(s) Oral <User Schedule>    MEDICATIONS  (PRN):      PHYSICAL EXAM:  Vital Signs Last 24 Hrs  T(C): 36.2 (28 Nov 2018 13:53), Max: 36.8 (28 Nov 2018 04:56)  T(F): 97.2 (28 Nov 2018 13:53), Max: 98.3 (28 Nov 2018 04:56)  HR: 75 (28 Nov 2018 17:33) (70 - 85)  BP: 109/56 (28 Nov 2018 17:33) (94/49 - 113/75)  BP(mean): --  RR: 18 (28 Nov 2018 17:33) (18 - 18)  SpO2: 99% (28 Nov 2018 17:33) (95% - 99%)  I&O's Summary    27 Nov 2018 07:01  -  28 Nov 2018 07:00  --------------------------------------------------------  IN: 400 mL / OUT: 1100 mL / NET: -700 mL      GENERAL: NAD, well-developed  HEAD:  Atraumatic, Normocephalic  EYES: EOMI, PERRLA, conjunctiva and sclera clear  NECK: Supple, No JVD  CHEST/LUNG: Clear to auscultation bilaterally; No wheeze  HEART: Regular rate and rhythm; No murmurs, rubs, or gallops  ABDOMEN: Soft, Nontender, distended; Bowel sounds present  EXTREMITIES:  2+ Peripheral Pulses, No clubbing, cyanosis, or edema  PSYCH: AAOx3  NEUROLOGY: non-focal  SKIN: No rashes or lesions    LABS:  CAPILLARY BLOOD GLUCOSE                              9.4    9.09  )-----------( 133      ( 28 Nov 2018 04:25 )             30.2     11-28    142  |  101  |  20  ----------------------------<  75  3.5   |  28  |  4.63<H>    Ca    9.0      28 Nov 2018 04:25  Phos  2.5     11-28  Mg     2.2     11-28    TPro  6.6  /  Alb  3.3  /  TBili  0.7  /  DBili  x   /  AST  36  /  ALT  16  /  AlkPhos  269<H>  11-27              RADIOLOGY & ADDITIONAL TESTS:    Imaging Personally Reviewed:    Consultant(s) Notes Reviewed:      Care Discussed with Consultants/Other Providers:
Patient is a 68y old  Male who presents with a chief complaint of Abdominal distension (29 Nov 2018 12:37)      SUBJECTIVE / OVERNIGHT EVENTS:  Abdominal distention persists, no shortness of breath.  Remains confused.  Review of Systems:   CONSTITUTIONAL: No fever, weight loss, or fatigue  EYES: No eye pain, visual disturbances, or discharge  ENMT:  No difficulty hearing, tinnitus, vertigo; No sinus or throat pain  NECK: No pain or stiffness  BREASTS: No pain, masses, or nipple discharge  RESPIRATORY: No cough, wheezing, chills or hemoptysis; No shortness of breath  CARDIOVASCULAR: No chest pain, palpitations, dizziness, or leg swelling  GASTROINTESTINAL: No abdominal or epigastric pain. No nausea, vomiting, or hematemesis; No diarrhea or constipation. No melena or hematochezia.  GENITOURINARY: No dysuria, frequency, hematuria, or incontinence  NEUROLOGICAL: No headaches, memory loss +  SKIN: No itching, burning, rashes, or lesions   LYMPH NODES: No enlarged glands  ENDOCRINE: No heat or cold intolerance; No hair loss  MUSCULOSKELETAL: No joint pain or swelling; No muscle, back, or extremity pain  PSYCHIATRIC: No depression, anxiety, mood swings, or difficulty sleeping  HEME/LYMPH: No easy bruising, or bleeding gums  ALLERY AND IMMUNOLOGIC: No hives or eczema    MEDICATIONS  (STANDING):  atorvastatin 80 milliGRAM(s) Oral at bedtime  chlorhexidine 4% Liquid 1 Application(s) Topical two times a day  epoetin shakira Injectable 15150 Unit(s) IV Push <User Schedule>  famotidine    Tablet 20 milliGRAM(s) Oral daily  furosemide    Tablet 40 milliGRAM(s) Oral four times a day  heparin  Injectable 5000 Unit(s) SubCutaneous every 12 hours  isosorbide   mononitrate ER Tablet (IMDUR) 120 milliGRAM(s) Oral daily  lisinopril 20 milliGRAM(s) Oral daily  midodrine 5 milliGRAM(s) Oral <User Schedule>    MEDICATIONS  (PRN):      PHYSICAL EXAM:  Vital Signs Last 24 Hrs  T(C): 36.8 (29 Nov 2018 18:12), Max: 36.8 (29 Nov 2018 06:12)  T(F): 98.2 (29 Nov 2018 18:12), Max: 98.2 (29 Nov 2018 06:12)  HR: 69 (29 Nov 2018 18:12) (69 - 76)  BP: 124/62 (29 Nov 2018 18:12) (102/66 - 132/67)  BP(mean): --  RR: 17 (29 Nov 2018 18:12) (17 - 18)  SpO2: 98% (29 Nov 2018 18:12) (97% - 99%)  I&O's Summary    29 Nov 2018 07:01  -  29 Nov 2018 18:45  --------------------------------------------------------  IN: 600 mL / OUT: 1700 mL / NET: -1100 mL      GENERAL: NAD, well-developed  HEAD:  Atraumatic, Normocephalic  EYES: EOMI, PERRLA, conjunctiva and sclera clear  NECK: Supple, No JVD  CHEST/LUNG: Clear to auscultation bilaterally; No wheeze  HEART: Regular rate and rhythm; No murmurs, rubs, or gallops  ABDOMEN: Soft, Nontender, Ascites felt. Bowel sounds present  EXTREMITIES:  2+ Peripheral Pulses, No clubbing, cyanosis, or edema  PSYCH:Awake but confused.  NEUROLOGY: non-focal  SKIN: No rashes or lesions    LABS:  CAPILLARY BLOOD GLUCOSE      POCT Blood Glucose.: 102 mg/dL (29 Nov 2018 17:31)  POCT Blood Glucose.: 120 mg/dL (29 Nov 2018 14:24)  POCT Blood Glucose.: 76 mg/dL (29 Nov 2018 13:26)  POCT Blood Glucose.: 123 mg/dL (29 Nov 2018 09:47)  POCT Blood Glucose.: 66 mg/dL (29 Nov 2018 08:49)                          10.1   10.58 )-----------( 134      ( 29 Nov 2018 06:00 )             32.9     11-29    140  |  98  |  27<H>  ----------------------------<  51<L>  4.3   |  21<L>  |  5.93<H>    Ca    9.4      29 Nov 2018 06:00  Phos  3.8     11-29  Mg     2.4     11-29      PT/INR - ( 29 Nov 2018 06:00 )   PT: 11.6 SEC;   INR: 1.02          PTT - ( 29 Nov 2018 06:00 )  PTT:21.2 SEC          RADIOLOGY & ADDITIONAL TESTS:    Imaging Personally Reviewed:    Consultant(s) Notes Reviewed:      Care Discussed with Consultants/Other Providers:
Patient is a 68y old  Male who presents with a chief complaint of Abdominal distension (30 Nov 2018 16:22)      SUBJECTIVE / OVERNIGHT EVENTS:  Confused, delirious. Abdominal distension persists  Review of Systems:   CONSTITUTIONAL: No fever, weight loss, or fatigue  EYES: No eye pain, visual disturbances, or discharge  ENMT:  No difficulty hearing, tinnitus, vertigo; No sinus or throat pain  NECK: No pain or stiffness  BREASTS: No pain, masses, or nipple discharge  RESPIRATORY: No cough, wheezing, chills or hemoptysis; No shortness of breath  CARDIOVASCULAR: No chest pain, palpitations, dizziness, or leg swelling  GASTROINTESTINAL: No abdominal or epigastric pain. No nausea, vomiting, or hematemesis; No diarrhea or constipation. No melena or hematochezia.  GENITOURINARY: No dysuria, frequency, hematuria, or incontinence  NEUROLOGICAL: No headaches, memory loss, loss of strength, numbness, or tremors  SKIN: No itching, burning, rashes, or lesions   LYMPH NODES: No enlarged glands  ENDOCRINE: No heat or cold intolerance; No hair loss  MUSCULOSKELETAL: No joint pain or swelling; No muscle, back, or extremity pain  PSYCHIATRIC: No depression, anxiety, mood swings, or difficulty sleeping  HEME/LYMPH: No easy bruising, or bleeding gums  ALLERY AND IMMUNOLOGIC: No hives or eczema    MEDICATIONS  (STANDING):  atorvastatin 80 milliGRAM(s) Oral at bedtime  chlorhexidine 4% Liquid 1 Application(s) Topical two times a day  epoetin shakira Injectable 67070 Unit(s) IV Push <User Schedule>  famotidine    Tablet 20 milliGRAM(s) Oral daily  furosemide    Tablet 40 milliGRAM(s) Oral four times a day  isosorbide   mononitrate ER Tablet (IMDUR) 120 milliGRAM(s) Oral daily  lisinopril 20 milliGRAM(s) Oral daily  midodrine 5 milliGRAM(s) Oral <User Schedule>    MEDICATIONS  (PRN):      PHYSICAL EXAM:  Vital Signs Last 24 Hrs  T(C): 36.6 (30 Nov 2018 20:24), Max: 37.1 (29 Nov 2018 22:07)  T(F): 97.9 (30 Nov 2018 20:24), Max: 98.7 (29 Nov 2018 22:07)  HR: 66 (30 Nov 2018 20:24) (66 - 71)  BP: 101/47 (30 Nov 2018 20:24) (97/60 - 120/75)  BP(mean): --  RR: 17 (30 Nov 2018 20:24) (17 - 18)  SpO2: 99% (30 Nov 2018 20:24) (97% - 99%)  I&O's Summary    29 Nov 2018 07:01  -  30 Nov 2018 07:00  --------------------------------------------------------  IN: 600 mL / OUT: 1700 mL / NET: -1100 mL      GENERAL: NAD, well-developed  HEAD:  Atraumatic, Normocephalic  EYES: EOMI, PERRLA, conjunctiva and sclera clear  NECK: Supple, No JVD  CHEST/LUNG: Clear to auscultation bilaterally; No wheeze  HEART: Regular rate and rhythm; No murmurs, rubs, or gallops  ABDOMEN: Soft, Nontender, distended; Bowel sounds present  EXTREMITIES:  2+ Peripheral Pulses, No clubbing, cyanosis, or edema  PSYCH: Pleasantly confused  NEUROLOGY: non-focal  SKIN: No rashes or lesions    LABS:  CAPILLARY BLOOD GLUCOSE                              10.5   9.10  )-----------( 143      ( 30 Nov 2018 06:30 )             33.2     11-30    138  |  97<L>  |  19  ----------------------------<  84  3.7   |  25  |  4.89<H>    Ca    9.3      30 Nov 2018 06:30  Phos  3.1     11-30  Mg     2.3     11-30    TPro  6.6  /  Alb  3.0<L>  /  TBili  0.7  /  DBili  x   /  AST  30  /  ALT  13  /  AlkPhos  235<H>  11-30    PT/INR - ( 29 Nov 2018 06:00 )   PT: 11.6 SEC;   INR: 1.02          PTT - ( 29 Nov 2018 06:00 )  PTT:21.2 SEC          RADIOLOGY & ADDITIONAL TESTS:    Imaging Personally Reviewed:    Consultant(s) Notes Reviewed:      Care Discussed with Consultants/Other Providers:
pt is with PMH/dementia, is puling on the tele monitor,   > discussed with Dr Gallardo, as per discussion, tele monitor was removed,
[facility]   MD JYOTI FAYE MD RUORU WONG, PA    TEL:  OFFICE: [phone]  DR ARMIJO CELL: [phone]  BARBY LEGGETT CELL: [phone]  DR. VALDEZ CELL: [phone]    RENAL FOLLOW UP NOTE  --------------------------------------------------------------------------------  HPI:      Pt seen and examined at bedside.   Kimber BERUMEN, chest pain     PAST HISTORY  --------------------------------------------------------------------------------  No significant changes to PMH, PSH, FHx, SHx, unless otherwise noted    ALLERGIES & MEDICATIONS  --------------------------------------------------------------------------------  Allergies    No Known Allergies    Intolerances      Standing Inpatient Medications  atorvastatin 80 milliGRAM(s) Oral at bedtime  chlorhexidine 4% Liquid 1 Application(s) Topical two times a day  clopidogrel Tablet 75 milliGRAM(s) Oral daily  epoetin shakira Injectable 49932 Unit(s) IV Push <User Schedule>  famotidine    Tablet 20 milliGRAM(s) Oral daily  furosemide    Tablet 40 milliGRAM(s) Oral four times a day  heparin  Injectable 5000 Unit(s) SubCutaneous every 12 hours  isosorbide   mononitrate ER Tablet (IMDUR) 120 milliGRAM(s) Oral daily  lisinopril 20 milliGRAM(s) Oral daily    PRN Inpatient Medications      REVIEW OF SYSTEMS  --------------------------------------------------------------------------------  General: no fever  CVS: no chest pain  RESP: no sob, no cough      VITALS/PHYSICAL EXAM  --------------------------------------------------------------------------------  T(C): 36.2 (11-28-18 @ 13:53), Max: 37.1 (11-27-18 @ 18:47)  HR: 76 (11-28-18 @ 13:53) (67 - 85)  BP: 113/75 (11-28-18 @ 13:53) (94/49 - 118/68)  RR: 18 (11-28-18 @ 13:53) (18 - 18)  SpO2: 98% (11-28-18 @ 13:53) (95% - 100%)  Wt(kg): --  Height (cm): 170.18 (11-27-18 @ 18:47)  Weight (kg): 72.3 (11-27-18 @ 18:47)  BMI (kg/m2): 25 (11-27-18 @ 18:47)  BSA (m2): 1.84 (11-27-18 @ 18:47)      11-27-18 @ 07:01  -  11-28-18 @ 07:00  --------------------------------------------------------  IN: 400 mL / OUT: 1100 mL / NET: -700 mL      Physical Exam:  	Gen: NAD  	HEENT: MMM  	Pulm: CTA B/L  	CV: S1S2  	Abd: Distended   	Ext: No LE edema B/L                      Neuro: Awake   	Skin: Warm and Dry   	Vascular access: AVF    LABS/STUDIES  --------------------------------------------------------------------------------              9.4    9.09  >-----------<  133      [11-28-18 @ 04:25]              30.2     142  |  101  |  20  ----------------------------<  75      [11-28-18 @ 04:25]  3.5   |  28  |  4.63        Ca     9.0     [11-28-18 @ 04:25]      Mg     2.2     [11-28-18 @ 04:25]      Phos  2.5     [11-28-18 @ 04:25]    TPro  6.6  /  Alb  3.3  /  TBili  0.7  /  DBili  x   /  AST  36  /  ALT  16  /  AlkPhos  269  [11-27-18 @ 12:26]          Creatinine Trend:  SCr 4.63 [11-28 @ 04:25]  SCr 6.44 [11-27 @ 12:26]        PTH 71.24 (Ca --)      [11-27-18 @ 21:10]   --    HBsAg NEGATIVE      [11-27-18 @ 21:10]
Patient is a 68y old  Male who presents with a chief complaint of Abdominal distension (01 Dec 2018 12:24)      SUBJECTIVE / OVERNIGHT EVENTS:  More awake and alert, still confused. No distress. No SOB at rest  Review of Systems:   CONSTITUTIONAL: No fever, weight loss, or fatigue  EYES: No eye pain, visual disturbances, or discharge  ENMT:  No difficulty hearing, tinnitus, vertigo; No sinus or throat pain  NECK: No pain or stiffness  BREASTS: No pain, masses, or nipple discharge  RESPIRATORY: No cough, wheezing, chills or hemoptysis; No shortness of breath  CARDIOVASCULAR: No chest pain, palpitations, dizziness, or leg swelling  GASTROINTESTINAL: No abdominal or epigastric pain. No nausea, vomiting, or hematemesis; No diarrhea or constipation. No melena or hematochezia.  GENITOURINARY: No dysuria, frequency, hematuria, or incontinence  NEUROLOGICAL: No headaches, memory loss, loss of strength, numbness, or tremors  SKIN: No itching, burning, rashes, or lesions   LYMPH NODES: No enlarged glands  ENDOCRINE: No heat or cold intolerance; No hair loss  MUSCULOSKELETAL: No joint pain or swelling; No muscle, back, or extremity pain  PSYCHIATRIC: No depression, anxiety, mood swings, or difficulty sleeping  HEME/LYMPH: No easy bruising, or bleeding gums  ALLERY AND IMMUNOLOGIC: No hives or eczema    MEDICATIONS  (STANDING):  atorvastatin 80 milliGRAM(s) Oral at bedtime  chlorhexidine 4% Liquid 1 Application(s) Topical two times a day  epoetin shakira Injectable 04554 Unit(s) IV Push <User Schedule>  famotidine    Tablet 20 milliGRAM(s) Oral daily  furosemide    Tablet 40 milliGRAM(s) Oral four times a day  isosorbide   mononitrate ER Tablet (IMDUR) 120 milliGRAM(s) Oral daily  lisinopril 20 milliGRAM(s) Oral daily  midodrine 5 milliGRAM(s) Oral <User Schedule>    MEDICATIONS  (PRN):      PHYSICAL EXAM:  Vital Signs Last 24 Hrs  T(C): 36.4 (01 Dec 2018 12:20), Max: 36.7 (01 Dec 2018 05:37)  T(F): 97.5 (01 Dec 2018 12:20), Max: 98.1 (01 Dec 2018 05:37)  HR: 71 (01 Dec 2018 12:20) (66 - 72)  BP: 122/70 (01 Dec 2018 12:20) (101/47 - 122/70)  BP(mean): --  RR: 17 (01 Dec 2018 12:20) (17 - 18)  SpO2: 99% (01 Dec 2018 12:20) (98% - 99%)  I&O's Summary    01 Dec 2018 07:01  -  01 Dec 2018 16:14  --------------------------------------------------------  IN: 500 mL / OUT: 1662 mL / NET: -1162 mL      GENERAL: NAD, well-developed  HEAD:  Atraumatic, Normocephalic  EYES: EOMI, PERRLA, conjunctiva and sclera clear  NECK: Supple, No JVD  CHEST/LUNG: Poor AE  HEART: Regular rate and rhythm; No murmurs, rubs, or gallops  ABDOMEN: Soft, Nontender, Nondistended; Bowel sounds present  EXTREMITIES:  2+ Peripheral Pulses, No clubbing, cyanosis, or edema  PSYCH: AAOx3  NEUROLOGY: non-focal  SKIN: No rashes or lesions    LABS:  CAPILLARY BLOOD GLUCOSE      POCT Blood Glucose.: 86 mg/dL (01 Dec 2018 12:09)                          10.1   8.17  )-----------( 134      ( 01 Dec 2018 06:15 )             30.6     12-01    137  |  96<L>  |  23  ----------------------------<  86  3.4<L>   |  26  |  6.01<H>    Ca    9.1      01 Dec 2018 06:15  Phos  3.8     12-01  Mg     2.2     12-01    TPro  6.3  /  Alb  2.8<L>  /  TBili  0.7  /  DBili  x   /  AST  29  /  ALT  13  /  AlkPhos  208<H>  12-01              RADIOLOGY & ADDITIONAL TESTS:    Imaging Personally Reviewed:    Consultant(s) Notes Reviewed:      Care Discussed with Consultants/Other Providers:

## 2018-12-07 NOTE — PROGRESS NOTE ADULT - PROBLEM SELECTOR PLAN 5
Etiology?   Follow up GI   UF with HD
Controlled  12/05: On the lower side: monitor on floor per protocol
- trend h/h   - no overt gi bleeding   - gi ppx with protonix qd
Controlled  12/05: On the lower side: monitor on floor per protocol
Etiology?   s/p diagnostic paracentesis on 11/29  Follow up GI   UF with HD
Etiology?   s/p diagnostic paracentesis on 11/29 consistent with cardiac ascites  Planned for therapeutic paracentesis on Monday   Follow up GI   UF with HD
Etiology?   s/p diagnostic paracentesis on 11/29 consistent with cardiac ascites  Planned for therapeutic paracentesis today  Follow up GI   UF with HD
Etiology?   s/p diagnostic paracentesis on 11/29 consistent with cardiac ascites  s/p tap 12/3 7L removal  Follow up GI   UF with HD

## 2018-12-07 NOTE — PROGRESS NOTE ADULT - PROBLEM SELECTOR PROBLEM 3
ESRD (end stage renal disease) on dialysis
Abdominal distension
Benign essential hypertension
Benign essential hypertension
ESRD (end stage renal disease) on dialysis
Abdominal distension
ESRD (end stage renal disease) on dialysis
HTN (hypertension)
Left ventricular outflow obstruction
HTN (hypertension)

## 2018-12-07 NOTE — PROGRESS NOTE ADULT - PROBLEM SELECTOR PROBLEM 4
Benign essential hypertension
Anemia due to chronic kidney disease, on chronic dialysis
Benign essential hypertension
ESRD (end stage renal disease) on dialysis
Benign essential hypertension
Chronic kidney disease-mineral and bone disorder
ESRD (end stage renal disease) on dialysis
Chronic kidney disease-mineral and bone disorder

## 2018-12-07 NOTE — PROGRESS NOTE ADULT - PROBLEM SELECTOR PLAN 3
HD as per Renal. monitor lytes. Associated anemia noted
- increase fluid removal with HD if possible   - continue recommendations per nephrology; appreciated
Ascitis: s/p tap: defer to gi  12/4:? secondary to cardiac etiology: defer to cards  12/5: Cardiac ascites:
HD as per Renal. monitor lytes. Associated anemia noted
HD as per Renal. monitor lytes. Associated anemia noted.
HD as per Renal. monitor lytes. Associated anemia noted. Lasi could be contributing to orthostatic hypotension. will reduce Lasix to BID
Monitor on current meds
Monitor on current meds
on HD
- noted on TTE  - fu cardiology recs/appreciated
Ascitis: s/p tap: defer to gi  12/4:? secondary to cardiac etiology: defer to cards  12/5: Cardiac ascites:  12/06: not much distended: Not affecting his respiration.
BP stable  Monitor BP closely
BP stable on dialysis   Monitor BP closely
on HD
BP on lower side   Monitor BP closely

## 2018-12-07 NOTE — PROGRESS NOTE ADULT - PROBLEM SELECTOR PROBLEM 2
Abdominal distension
Delirium
ESRD (end stage renal disease) on dialysis
ESRD (end stage renal disease) on dialysis
ICH (intracerebral hemorrhage)
Left ventricular outflow obstruction
Seizure as late effect of cerebrovascular accident (CVA)
Abdominal distension
Altered mental status
Anemia due to chronic kidney disease, on chronic dialysis
ICH (intracerebral hemorrhage)
Anemia due to chronic kidney disease, on chronic dialysis
Delirium

## 2018-12-07 NOTE — PROGRESS NOTE ADULT - PROVIDER SPECIALTY LIST ADULT
CT Surgery
Cardiology
Gastroenterology
Internal Medicine
Nephrology
Neurology
Pulmonology
Pulmonology
Nephrology
Neurology
Internal Medicine
Gastroenterology
Pulmonology
Pulmonology

## 2018-12-07 NOTE — PROGRESS NOTE ADULT - PROBLEM SELECTOR PROBLEM 5
Benign essential hypertension
Abdominal distension
Anemia due to chronic kidney disease, on chronic dialysis
Benign essential hypertension
Abdominal distension

## 2018-12-07 NOTE — PROGRESS NOTE ADULT - ATTENDING COMMENTS
Confusion: An unknown reason.  Possibly from undiagnosed seiqures
Confusion: An unknown reason.  CT of the head is recommended.
Confusion has improved: An unknown reason.  Possibly from undiagnosed seizures
Confusion has improved: Possibly from undiagnosed seizures
Confusion: An unknown reason.  CT of the head is recommended.
Confusion: An unknown reason.  Possibly from undiagnosed seiqures
DC Planning for rehab
DC Planning for rehab
He may need tap o n the rigth side: Has mod to large loculated pl effusion  12/3: for SARAY: He is confused but not SOB: Does have loculated effusion on the rigth side: CTS called:
He may need tap o n the rigth side: Has mod to large loculated pl effusion  12/3: for SARAY: He is confused but not SOB: Does have loculated effusion on the rigth side: CTS called:  12/4: he has b een confused: he is not in any resp distress: he has moderate loculated effusion on the rigth side ? etiology ? secondary to ESRD: CTS CALLED BY ME:
He may need tap o n the rigth side: Has mod to large loculated pl effusion  12/3: for SARAY: He is confused but not SOB: Does have loculated effusion on the rigth side: CTS called:  12/4: he has b een confused: he is not in any resp distress: he has moderate loculated effusion on the rigth side ? etiology ? secondary to ESRD: CTS CALLED BY ME:  12/5: No Intervention from pulmonary side about the small loculated effusion on the right side: Wife refused for any intervention:
Advanced care planning was discussed with patient and family.  Advanced care planning forms were reviewed and discussed.  Risks, benefits and alternatives of gastroenterologic procedures were discussed in detail and all questions were answered.    30 minutes spent.
He may need tap o n the rigth side: Has mod to large loculated pl effusion  12/3: for SARAY: He is confused but not SOB: Does have loculated effusion on the rigth side: CTS called:  12/4: he has b een confused: he is not in any resp distress: he has moderate loculated effusion on the rigth side ? etiology ? secondary to ESRD: CTS CALLED BY ME:  12/5: No Intervention from pulmonary side about the small loculated effusion on the right side: Wife refused for any intervention:  12/6: Seems to be stable from pulmonary point of view despite right sided loculated effusion: No intervention per wife:

## 2018-12-07 NOTE — PROGRESS NOTE ADULT - PROBLEM SELECTOR PLAN 4
Monitor on current meds
- trend h/h   - no overt gi bleeding   - gi ppx with protonix qd
Controlled
Monitor on current meds
Monitor on current meds
Off meds for now due to orthostasis.
on HD
- increase fluid removal with HD if possible   - continue recommendations per nephrology; appreciated
Controlled
PTH 71 on 11/27 - at goal for ESRD  Monitor serum calcium and PO4
on HD
PTH 71 on 11/27 - at goal for ESRD  Monitor serum calcium and PO4

## 2018-12-07 NOTE — PROGRESS NOTE ADULT - REASON FOR ADMISSION
Abdominal distension

## 2018-12-28 LAB — FUNGUS SPEC QL CULT: SIGNIFICANT CHANGE UP

## 2019-01-10 LAB — ACID FAST STN FLD: SIGNIFICANT CHANGE UP

## 2019-04-30 NOTE — PHYSICAL THERAPY INITIAL EVALUATION ADULT - RANGE OF MOTION EXAMINATION, REHAB EVAL
bilateral upper extremity ROM was WFL (within functional limits)/bilateral lower extremity ROM was WFL (within functional limits) no

## 2020-07-22 NOTE — ED ADULT TRIAGE NOTE - NS AS WEIGHT METHOD - PEDI/INFANT
OK to send orders to primary care provider Halle Mtz MD to be signed. Call left with [facility].   Halle Mtz MD    stated

## 2021-12-16 NOTE — PROGRESS NOTE ADULT - PROBLEM SELECTOR PLAN 2
This is an Initial Medicare Annual Wellness Exam (AWV) (Performed 12 months after IPPE or effective date of Medicare Part B enrollment, Once in a lifetime)    I have reviewed the patient's medical history in detail and updated the computerized patient record. Assessment/Plan   Education and counseling provided:  Are appropriate based on today's review and evaluation    1. Hyperlipidemia LDL goal <130  2. Severe obesity (BMI 35.0-35.9 with comorbidity) ([facility] [address]  3. Cancer of appendix ([facility] [address]  4. Anxiety state  5. Prediabetes       Depression Risk Factor Screening     3 most recent PHQ Screens 12/16/2021   Little interest or pleasure in doing things Not at all   Feeling down, depressed, irritable, or hopeless Not at all   Total Score PHQ 2 0       Alcohol Risk Screen    Do you average more than 1 drink per night or more than 7 drinks a week:  No    On any one occasion in the past three months have you have had more than 3 drinks containing alcohol:  No         Functional Ability and Level of Safety    Hearing: Hearing is good. Activities of Daily Living: The home contains: no safety equipment. Patient does total self care     Ambulation: with no difficulty      Fall Risk:  Fall Risk Assessment, last 12 mths 12/16/2021   Able to walk? Yes   Fall in past 12 months? 0   Do you feel unsteady?  0   Are you worried about falling 0      Abuse Screen:  Patient is not abused       Cognitive Screening    Has your family/caregiver stated any concerns about your memory: no     Cognitive Screening: Normal - MMSE (Mini Mental Status Exam)    Health Maintenance Due     Health Maintenance Due   Topic Date Due    Shingrix Vaccine Age 50> (1 of 2) Never done    Medicare Yearly Exam  Never done       Patient Care Team   Patient Care Team:  Isak López MD as PCP - General  Pablo Izquierdo, David Isaacs MD as PCP - Jannette Valera Provider  Marie Church NP (Neurology)    History     Patient Active Problem List   Diagnosis Code    RAD (reactive airway disease) J45.909    AR (allergic rhinitis) J30.9    Hyperlipidemia LDL goal <130 E78.5    Anxiety state F41.1    Cancer of appendix ([facility] [address] C18.1    Severe obesity (BMI 35.0-39. 9) E66.01     Past Medical History:   Diagnosis Date    Anxiety state, unspecified 2009    AR (allergic rhinitis)     Arthritis     Cancer of appendix ([facility] [address] 2017    Depression     Fatigue     Hernia cerebri (HCC)     Other and unspecified hyperlipidemia 2009    RAD (reactive airway disease)     Snoring     Vertigo       Past Surgical History:   Procedure Laterality Date    HX CARPAL TUNNEL RELEASE      HX  SECTION      HX DILATION AND CURETTAGE      HX HERNIA REPAIR  2021    HX TONSILLECTOMY      HX UROLOGICAL      [place] CYSTOSCOPY,RESEC EJACULATORY DUCT       Current Outpatient Medications   Medication Sig Dispense Refill    baclofen (LIORESAL) 10 mg tablet Take 10 mg by mouth three (3) times daily. As needed      ALPRAZolam (XANAX) 0.5 mg tablet Take 1 Tablet by mouth nightly as needed for Anxiety. Max Daily Amount: 0.5 mg. 90 Tablet 1    atorvastatin (LIPITOR) 20 mg tablet Take 1 Tab by mouth daily. 90 Tab 0    meloxicam (MOBIC) 15 mg tablet TAKE 1 TABLET BY MOUTH EVERY DAY  1    losartan (COZAAR) 25 mg tablet Take 25 mg by mouth daily.  (Patient not taking: Reported on 2021)       Allergies   Allergen Reactions    Adhesive Tape-Silicones Other (comments)     blisters    Sulfa (Sulfonamide Antibiotics) Hives    Wellbutrin [Bupropion] Hives       Family History   Problem Relation Age of Onset    Diabetes Mother     Arthritis-rheumatoid Mother     Elevated Lipids Mother     Heart Disease Mother     Headache Mother     Neuropathy Mother     Thyroid Disease Mother     Cancer Father         prostate    Stroke Father      Social History     Tobacco Use    Smoking status: Never Smoker    Smokeless tobacco: Never Used   Substance Use Topics    Alcohol use: No     Alcohol/week: 0.0 standard drinks     Comment: yessy Hayes MD Improving now.

## 2022-01-17 NOTE — ED PROVIDER NOTE - NSCAREINITIATED _GEN_ER
Good afternoon Dr Shima Zamora MD      Patient declined to schedule the  appointments for audiology and optometry      Cyndi Galvin(Attending)

## 2022-02-25 NOTE — PATIENT PROFILE ADULT - DO YOU FEEL UNSAFE AT WORK?
Alert-The patient is alert, awake and responds to voice. The patient is oriented to time, place, and person. The triage nurse is able to obtain subjective information. not applicable

## 2024-10-01 NOTE — DISCHARGE NOTE ADULT - MEDICATION SUMMARY - MEDICATIONS TO STOP TAKING
No I will STOP taking the medications listed below when I get home from the hospital:    aspirin 81 mg oral tablet, chewable  -- 1 tab(s) by mouth once a day    Plavix 75 mg oral tablet  -- 1 tab(s) by mouth once a day    Lasix 40 mg oral tablet  -- 1 tab(s) by mouth 4 times a day    isosorbide mononitrate 120 mg oral tablet, extended release  -- 1 tab(s) by mouth once a day (in the morning)    lisinopril 20 mg oral tablet  -- 1 tab(s) by mouth once a day

## 2024-10-16 NOTE — DIETITIAN INITIAL EVALUATION ADULT. - NS AS NUTRI INTERV ED CONTENT
Survival information/Recommended modifications/Left diet instruction on dialysis diet for pt's wife/Purpose of the nutrition education
Yes